# Patient Record
Sex: FEMALE | Race: WHITE | NOT HISPANIC OR LATINO | Employment: OTHER | URBAN - METROPOLITAN AREA
[De-identification: names, ages, dates, MRNs, and addresses within clinical notes are randomized per-mention and may not be internally consistent; named-entity substitution may affect disease eponyms.]

---

## 2017-03-12 ENCOUNTER — MYC MEDICAL ADVICE (OUTPATIENT)
Dept: FAMILY MEDICINE | Facility: CLINIC | Age: 72
End: 2017-03-12

## 2017-03-15 ENCOUNTER — MYC MEDICAL ADVICE (OUTPATIENT)
Dept: FAMILY MEDICINE | Facility: CLINIC | Age: 72
End: 2017-03-15

## 2017-03-18 ENCOUNTER — DOCUMENTATION ONLY (OUTPATIENT)
Dept: LAB | Facility: CLINIC | Age: 72
End: 2017-03-18

## 2017-03-18 DIAGNOSIS — E78.5 HYPERLIPIDEMIA LDL GOAL <130: ICD-10-CM

## 2017-03-18 DIAGNOSIS — R73.09 ELEVATED GLUCOSE: ICD-10-CM

## 2017-03-18 DIAGNOSIS — E03.9 HYPOTHYROIDISM: Primary | ICD-10-CM

## 2017-03-18 NOTE — PROGRESS NOTES
This patient is coming to lab April 6th.  orders have been placed for you to associate and sign. Please future any further labs you may want, thanks [facility] lab staff.

## 2017-03-23 NOTE — TELEPHONE ENCOUNTER
Called and spoke with pt and she states has an appt with Lisa Olivia on 4/7/17.    States this is ok with her.  She has not had any further vaginal bleeding but will follow with the OBGYN

## 2017-04-07 ENCOUNTER — OFFICE VISIT (OUTPATIENT)
Dept: OBGYN | Facility: CLINIC | Age: 72
End: 2017-04-07
Payer: COMMERCIAL

## 2017-04-07 VITALS
DIASTOLIC BLOOD PRESSURE: 80 MMHG | HEIGHT: 61 IN | WEIGHT: 170.8 LBS | TEMPERATURE: 97.9 F | BODY MASS INDEX: 32.25 KG/M2 | SYSTOLIC BLOOD PRESSURE: 128 MMHG

## 2017-04-07 DIAGNOSIS — N90.4 LICHEN SCLEROSUS ET ATROPHICUS OF THE VULVA: ICD-10-CM

## 2017-04-07 DIAGNOSIS — N93.0 POSTCOITAL BLEEDING: Primary | ICD-10-CM

## 2017-04-07 DIAGNOSIS — Z01.411 ENCOUNTER FOR GYNECOLOGICAL EXAMINATION WITH ABNORMAL FINDING: ICD-10-CM

## 2017-04-07 PROCEDURE — 99204 OFFICE O/P NEW MOD 45 MIN: CPT | Performed by: OBSTETRICS & GYNECOLOGY

## 2017-04-07 NOTE — PROGRESS NOTES
SUBJECTIVE:   CC: Post-coital bleeding                                            HPI: Abby Casey is a 72 year old  female who presents to clinic today for new onset post-coital bleeding in the pat month. Occurred twice, both times while visiting [place]. Has since then had intercourse with no bleeding.   - No prior occurrence of PMB.   - Previously on HRT for less than 1 year then self discontinued.   - Was prescribed vaginal estrogen years ago for atrophy but never started.   - On weekly clobetasol maintenance for lichen sclerosis, doing well with this  - no family history uterine, breast, ovarian cancer but worried about ovarian cancer    Problem list and histories reviewed & adjusted, as indicated.  Additional history: as documented.    ROS:  Const: no weight changes, fever, chills  : no dysuria, hematuria, urinary frequency, urgency, hesitancy  GI:  no constipation, diarrhea, abdominal pain    Patient Active Problem List   Diagnosis     Hypothyroidism     Abnormal blood chemistry     Disorder of bone and cartilage     Diverticulosis of large intestine     Lichen sclerosus et atrophicus of the vulva     HYPERLIPIDEMIA LDL GOAL <130     Elevated glucose     Advanced directives, counseling/discussion     Papanicolaou smear of cervix with low grade squamous intraepithelial lesion (LGSIL)     Acute pain of right shoulder     Right rotator cuff tear     Rupture of right proximal biceps tendon     Tendinopathy of right rotator cuff     Osteoarthritis of right acromioclavicular joint     Adjustment disorder with anxious mood     Obesity     Past Surgical History:   Procedure Laterality Date     C NONSPECIFIC PROCEDURE  10/99    Colposcopy--reactive atypia         C REMOVAL GALLBLADDER      [facility]       COLONOSCOPY       and      COLONOSCOPY N/A 2016    Procedure: COLONOSCOPY;  Surgeon: Adriano Roberto MD, MD;  Location:  GI     EYE SURGERY      Bilateral cataract extraction      "TUBAL LIGATION        Social History   Substance Use Topics     Smoking status: Never Smoker     Smokeless tobacco: Never Used     Alcohol use Yes      Problem (# of Occurrences) Relation (Name,Age of Onset)    CANCER (1) Father: Liver Cancer, Eye  Cancer    DIABETES (3) Brother, Brother, Sister    HEART DISEASE (1) Father:  @77       Negative family history of: Colon Cancer              Current Outpatient Prescriptions on File Prior to Visit:  hydrOXYzine (VISTARIL) 25 MG capsule    clobetasol (TEMOVATE) 0.05 % cream use at bedtime weekly at bedtime for maintenance   simvastatin (ZOCOR) 40 MG tablet Take 1 tablet (40 mg) by mouth At Bedtime   levothyroxine (SYNTHROID, LEVOTHROID) 125 MCG tablet Take 1 tablet (125 mcg) by mouth daily Profile Rx: patient will contact pharmacy when needed   Chromium-Cinnamon 100-500 MCG-MG CAPS Take 1 tablet by mouth 2 times daily   guaiFENesin (MUCINEX) 600 MG 12 hr tablet Take 2 tablets by mouth 2 times daily as needed for congestion.   NAPROSYN 500 MG OR TABS 1 TABLET TWICE DAILY PRN   PROSHIELD PLUS SKIN PROTECTANT 1 % EX CREA use as needed  up to QID   CALCIUM + D 600-200 MG-UNIT OR TABS 1 TABLET DAILY   ASPIRIN 81 MG OR TABS ONE DAILY   MULTIVITAMIN OR 1 daily     No current facility-administered medications on file prior to visit.   Allergies   Allergen Reactions     Neurontin [Gabapentin] Visual Disturbance     Amoxicillin Hives     Clindamycin Hcl Diarrhea       OBJECTIVE:   /80 (BP Location: Left arm, Patient Position: Chair, Cuff Size: Adult Regular)  Temp 97.9  F (36.6  C) (Oral)  Ht 5' 1.25\" (1.556 m)  Wt 170 lb 12.8 oz (77.5 kg)  BMI 32.01 kg/m2   Gen: sitting in chair in no acute distress, comfortable  HENT: no scleral icterus, thyroid normal size  CV: regular rate, well perfused  Pulm: no increased work of breathing, no cough  Skin: warm and dry, no rashes/lesions  Psych: mood stable, appropriate affect  Neuro: A+Ox3   : External genitalia atrophic " with fusion of anterior labia minora and majora. 2mm inclusion cyst on left labia majora at 3 o'clock position. No obvious excoriations, lesions, or rashes. Urethra with small hyperemic caruncle, no obvious bleeding during exam.  SSE: Normal cervix, no polyp or ectropion.   Bimanual: No CMT, small mobile uterus, fullness noted in right adnexa.      ASSESSMENT/PLAN:                                                    Abby Casey is a 72 year old female with postmenopausal bleeding and routine gyn exam.    1. Postcoital bleeding  - no evidence of cervical abnormality as source of bleeding, however urethral caruncle may be the cause  - discussed risk of malignancy and endometrial biopsy to rule out. Will consider pending US findings.   - US Pelvic Complete w Transvaginal; Future    2. Encounter for gynecological examination with abnormal finding  - up to date on mammogram, repeat due in June  - recommend annual breast and pelvic exams    3. Lichen sclerosus et atrophicus of the vulva  - continue clobetasol once weekly     4. Intermittent LLQ pelvic pain  - discussed etiologies to include ovarian cysts, known diverticulum, musculoskeletal, neruopathic. US for evaluation of ovaries.    Total time spent was 40 minutes; greater than 50% of time was spent in counseling and/or coordination of care for the above listed diagnoses.     Negra Olivia MD  Obstetrics and Gynecology   [facility]

## 2017-04-07 NOTE — MR AVS SNAPSHOT
After Visit Summary   4/7/2017    Abby Casey    MRN: [id number]           Patient Information     Date Of Birth          1945        Visit Information        Provider Department      4/7/2017 1:15 PM Negra Olivia MD [facility]        Today's Diagnoses     Postcoital bleeding    -  1    Encounter for gynecological examination with abnormal finding        Lichen sclerosus et atrophicus of the vulva           Follow-ups after your visit        Your next 10 appointments already scheduled     Apr 17, 2017  8:30 AM CDT   Lab visit with  LAB   [facility] ([facility]    [address] [address] [phone]           Please do not eat 10-12 hours before your appointment if you are coming in fasting for labs on lipids, cholesterol, or glucose (sugar). Does not apply to pregnant women.  Water with medications is okay. Do not drink coffee or other fluids.  If you have concerns about taking  your medications, please send a message by clicking on Secure Messaging, Message Your Care Team.            Apr 25, 2017  9:00 AM CDT   US PELVIC COMPLETE W TRANSVAGINAL with RIUS1   [facility] ([facility])    [address]  [address]  [facility] [address] [phone]           Please bring a list of your medicines (including vitamins, minerals and over-the-counter drugs). Also, tell your doctor about any allergies you may have. Wear comfortable clothes and leave your valuables at home.  Adults: Drink six 8-ounce glasses of fluid one hour before your exam. Do NOT empty your bladder.  If you need to empty your bladder before your exam, try to release only a little bit of urine. Then, drink another 8oz glass of fluid.  Children: Children who are potty trained should drink at least 4 cups (32 oz) of liquid 45 minutes to one hour prior to the exam. The child s bladder must be full in  order to achieve a diagnostic exam. If your child is very uncomfortable or has an urgent need to pee, please notify a technologist; they will try to find out how much longer the wait may be and provide instructions to help relieve the pressure. Occasionally it is medically necessary to insert a urinary catheter to fill the bladder.  Please call the Imaging Department at your exam site with any questions.            Apr 26, 2017  8:30 AM CDT   MyChart Physical Adult with Gela Bueno MD   [facility]    [address] [facility] [address] [phone]            Apr 28, 2017  9:15 AM CDT   SHORT with Negra Olivia MD   [facility] ([facility])    [address] [place]  [facility] [address] [phone]              Future tests that were ordered for you today     Open Future Orders        Priority Expected Expires Ordered    US Pelvic Complete w Transvaginal Routine 7/6/2017 4/7/2018 4/7/2017            Who to contact     If you have questions or need follow up information about today's clinic visit or your schedule please contact [facility] directly at [phone].  Normal or non-critical lab and imaging results will be communicated to you by Eurocepthart, letter or phone within 4 business days after the clinic has received the results. If you do not hear from us within 7 days, please contact the clinic through Eurocepthart or phone. If you have a critical or abnormal lab result, we will notify you by phone as soon as possible.  Submit refill requests through Meet You or call your pharmacy and they will forward the refill request to us. Please allow 3 business days for your refill to be completed.          Additional Information About Your Visit        Meet You Information     Meet You gives you secure access to your electronic health record. If you see a primary care provider, you can also send messages  "to your care team and make appointments. If you have questions, please call your primary care clinic.  If you do not have a primary care provider, please call [phone] and they will assist you.        Care EveryWhere ID     This is your Care EveryWhere ID. This could be used by other organizations to access your [facility] medical records  [id number]        Your Vitals Were     Temperature Height BMI (Body Mass Index)             97.9  F (36.6  C) (Oral) 5' 1.25\" (1.556 m) 32.01 kg/m2          Blood Pressure from Last 3 Encounters:   04/07/17 128/80   11/16/16 107/71   04/25/16 124/68    Weight from Last 3 Encounters:   04/07/17 170 lb 12.8 oz (77.5 kg)   11/16/16 169 lb (76.7 kg)   04/25/16 175 lb 9.6 oz (79.7 kg)               Primary Care Provider Office Phone # Fax #    Gela Bueno [phone]       [facility] [address] [facility] [address]        Thank you!     Thank you for choosing [facility]  for your care. Our goal is always to provide you with excellent care. Hearing back from our patients is one way we can continue to improve our services. Please take a few minutes to complete the written survey that you may receive in the mail after your visit with us. Thank you!             Your Updated Medication List - Protect others around you: Learn how to safely use, store and throw away your medicines at www.disposemymeds.org.          This list is accurate as of: 4/7/17  4:29 PM.  Always use your most recent med list.                   Brand Name Dispense Instructions for use    aspirin 81 MG tablet     100    ONE DAILY       calcium + D 600-200 MG-UNIT Tabs   Generic drug:  calcium carbonate-vitamin D      1 TABLET DAILY       Chromium-Cinnamon 100-500 MCG-MG Caps      Take 1 tablet by mouth 2 times daily       clobetasol 0.05 % cream    TEMOVATE    15 g    use at bedtime weekly at bedtime for maintenance       guaiFENesin 600 MG 12 hr tablet "    MUCINEX    40 tablet    Take 2 tablets by mouth 2 times daily as needed for congestion.       hydrOXYzine 25 MG capsule    VISTARIL         levothyroxine 125 MCG tablet    SYNTHROID/LEVOTHROID    90 tablet    Take 1 tablet (125 mcg) by mouth daily Profile Rx: patient will contact pharmacy when needed       MULTIVITAMIN PO      1 daily       NAPROSYN 500 MG tablet   Generic drug:  naproxen      1 TABLET TWICE DAILY PRN       PROSHIELD PLUS SKIN PROTECTANT 1 %   Generic drug:  dimethicone     1 tube    use as needed  up to QID       simvastatin 40 MG tablet    ZOCOR    90 tablet    Take 1 tablet (40 mg) by mouth At Bedtime

## 2017-04-07 NOTE — NURSING NOTE
"Chief Complaint   Patient presents with     Vaginal Problem   bleeding after intercourse. No spotting at all just blood on tissue with wiping.  Has had intercourse since those 2x and did not have any bleeding.  Aleksandra Tierney MA    Initial /80 (BP Location: Left arm, Patient Position: Chair, Cuff Size: Adult Regular)  Temp 97.9  F (36.6  C) (Oral)  Ht 5' 1.25\" (1.556 m)  Wt 170 lb 12.8 oz (77.5 kg)  BMI 32.01 kg/m2 Estimated body mass index is 32.01 kg/(m^2) as calculated from the following:    Height as of this encounter: 5' 1.25\" (1.556 m).    Weight as of this encounter: 170 lb 12.8 oz (77.5 kg).  BP completed using cuff size: regular        The following HM Due: NONE      The following patient reported/Care Every where data was sent to:        n/a                       "

## 2017-04-17 DIAGNOSIS — E03.9 HYPOTHYROIDISM: ICD-10-CM

## 2017-04-17 DIAGNOSIS — R73.09 ELEVATED GLUCOSE: ICD-10-CM

## 2017-04-17 DIAGNOSIS — E78.5 HYPERLIPIDEMIA LDL GOAL <130: ICD-10-CM

## 2017-04-17 LAB
ALBUMIN SERPL-MCNC: 3.8 G/DL (ref 3.4–5)
ALP SERPL-CCNC: 103 U/L (ref 40–150)
ALT SERPL W P-5'-P-CCNC: 22 U/L (ref 0–50)
ANION GAP SERPL CALCULATED.3IONS-SCNC: 9 MMOL/L (ref 3–14)
AST SERPL W P-5'-P-CCNC: 19 U/L (ref 0–45)
BILIRUB SERPL-MCNC: 0.5 MG/DL (ref 0.2–1.3)
BUN SERPL-MCNC: 19 MG/DL (ref 7–30)
CALCIUM SERPL-MCNC: 9.3 MG/DL (ref 8.5–10.1)
CHLORIDE SERPL-SCNC: 108 MMOL/L (ref 94–109)
CHOLEST SERPL-MCNC: 169 MG/DL
CO2 SERPL-SCNC: 26 MMOL/L (ref 20–32)
CREAT SERPL-MCNC: 0.61 MG/DL (ref 0.52–1.04)
GFR SERPL CREATININE-BSD FRML MDRD: ABNORMAL ML/MIN/1.7M2
GLUCOSE SERPL-MCNC: 120 MG/DL (ref 70–99)
HBA1C MFR BLD: 5.9 % (ref 4.3–6)
HDLC SERPL-MCNC: 44 MG/DL
LDLC SERPL CALC-MCNC: 92 MG/DL
NONHDLC SERPL-MCNC: 125 MG/DL
POTASSIUM SERPL-SCNC: 4.7 MMOL/L (ref 3.4–5.3)
PROT SERPL-MCNC: 7.3 G/DL (ref 6.8–8.8)
SODIUM SERPL-SCNC: 143 MMOL/L (ref 133–144)
T4 FREE SERPL-MCNC: 1.47 NG/DL (ref 0.76–1.46)
TRIGL SERPL-MCNC: 167 MG/DL
TSH SERPL DL<=0.005 MIU/L-ACNC: 0.12 MU/L (ref 0.4–4)

## 2017-04-17 PROCEDURE — 84443 ASSAY THYROID STIM HORMONE: CPT | Performed by: INTERNAL MEDICINE

## 2017-04-17 PROCEDURE — 83036 HEMOGLOBIN GLYCOSYLATED A1C: CPT | Performed by: INTERNAL MEDICINE

## 2017-04-17 PROCEDURE — 84439 ASSAY OF FREE THYROXINE: CPT | Performed by: INTERNAL MEDICINE

## 2017-04-17 PROCEDURE — 36415 COLL VENOUS BLD VENIPUNCTURE: CPT | Performed by: INTERNAL MEDICINE

## 2017-04-17 PROCEDURE — 80061 LIPID PANEL: CPT | Performed by: INTERNAL MEDICINE

## 2017-04-17 PROCEDURE — 80053 COMPREHEN METABOLIC PANEL: CPT | Performed by: INTERNAL MEDICINE

## 2017-04-25 ENCOUNTER — RADIANT APPOINTMENT (OUTPATIENT)
Dept: ULTRASOUND IMAGING | Facility: CLINIC | Age: 72
End: 2017-04-25
Attending: OBSTETRICS & GYNECOLOGY
Payer: COMMERCIAL

## 2017-04-25 DIAGNOSIS — N93.0 POSTCOITAL BLEEDING: ICD-10-CM

## 2017-04-25 PROCEDURE — 76830 TRANSVAGINAL US NON-OB: CPT | Mod: 59 | Performed by: OBSTETRICS & GYNECOLOGY

## 2017-04-25 PROCEDURE — 76856 US EXAM PELVIC COMPLETE: CPT | Performed by: OBSTETRICS & GYNECOLOGY

## 2017-04-26 ENCOUNTER — OFFICE VISIT (OUTPATIENT)
Dept: FAMILY MEDICINE | Facility: CLINIC | Age: 72
End: 2017-04-26
Payer: COMMERCIAL

## 2017-04-26 VITALS
DIASTOLIC BLOOD PRESSURE: 68 MMHG | HEIGHT: 62 IN | OXYGEN SATURATION: 98 % | SYSTOLIC BLOOD PRESSURE: 124 MMHG | WEIGHT: 167.5 LBS | RESPIRATION RATE: 16 BRPM | HEART RATE: 71 BPM | TEMPERATURE: 97.7 F | BODY MASS INDEX: 30.82 KG/M2

## 2017-04-26 DIAGNOSIS — E78.5 HYPERLIPIDEMIA LDL GOAL <130: ICD-10-CM

## 2017-04-26 DIAGNOSIS — Z00.00 ROUTINE GENERAL MEDICAL EXAMINATION AT A HEALTH CARE FACILITY: Primary | ICD-10-CM

## 2017-04-26 DIAGNOSIS — E03.9 HYPOTHYROIDISM, UNSPECIFIED TYPE: ICD-10-CM

## 2017-04-26 DIAGNOSIS — Z12.31 VISIT FOR SCREENING MAMMOGRAM: ICD-10-CM

## 2017-04-26 DIAGNOSIS — Z11.59 NEED FOR HEPATITIS C SCREENING TEST: ICD-10-CM

## 2017-04-26 PROCEDURE — 36415 COLL VENOUS BLD VENIPUNCTURE: CPT | Performed by: INTERNAL MEDICINE

## 2017-04-26 PROCEDURE — 86803 HEPATITIS C AB TEST: CPT | Performed by: INTERNAL MEDICINE

## 2017-04-26 PROCEDURE — 99397 PER PM REEVAL EST PAT 65+ YR: CPT | Performed by: INTERNAL MEDICINE

## 2017-04-26 PROCEDURE — 99213 OFFICE O/P EST LOW 20 MIN: CPT | Mod: 25 | Performed by: INTERNAL MEDICINE

## 2017-04-26 PROCEDURE — 84443 ASSAY THYROID STIM HORMONE: CPT | Performed by: INTERNAL MEDICINE

## 2017-04-26 PROCEDURE — 84439 ASSAY OF FREE THYROXINE: CPT | Performed by: INTERNAL MEDICINE

## 2017-04-26 NOTE — NURSING NOTE
"Chief Complaint   Patient presents with     Physical     Thyroid Problem     Lipids       Initial /68 (BP Location: Right arm, Patient Position: Chair, Cuff Size: Adult Large)  Pulse 71  Temp 97.7  F (36.5  C) (Oral)  Resp 16  Ht 5' 1.8\" (1.57 m)  Wt 167 lb 8 oz (76 kg)  SpO2 98%  BMI 30.83 kg/m2 Estimated body mass index is 30.83 kg/(m^2) as calculated from the following:    Height as of this encounter: 5' 1.8\" (1.57 m).    Weight as of this encounter: 167 lb 8 oz (76 kg).  Medication Reconciliation: complete        "

## 2017-04-26 NOTE — MR AVS SNAPSHOT
After Visit Summary   4/26/2017    Abby Casey    MRN: [id number]           Patient Information     Date Of Birth          1945        Visit Information        Provider Department      4/26/2017 8:30 AM Gela Bueno MD [facility]        Today's Diagnoses     Routine general medical examination at a health care facility    -  1    Hyperlipidemia LDL goal <130        Hypothyroidism, unspecified type        Visit for screening mammogram        Need for hepatitis C screening test          Care Instructions      Preventive Health Recommendations    Female Ages 65 +    Yearly exam:     See your health care provider every year in order to  o Review health changes.   o Discuss preventive care.    o Review your medicines if your doctor has prescribed any.      You no longer need a yearly Pap test unless you've had an abnormal Pap test in the past 10 years. If you have vaginal symptoms, such as bleeding or discharge, be sure to talk with your provider about a Pap test.      Every 1 to 2 years, have a mammogram.  If you are over 69, talk with your health care provider about whether or not you want to continue having screening mammograms.      Every 10 years, have a colonoscopy. Or, have a yearly FIT test (stool test). These exams will check for colon cancer.       Have a cholesterol test every 5 years, or more often if your doctor advises it.       Have a diabetes test (fasting glucose) every three years. If you are at risk for diabetes, you should have this test more often.       At age 65, have a bone density scan (DEXA) to check for osteoporosis (brittle bone disease).    Shots:    Get a flu shot each year.    Get a tetanus shot every 10 years.    Talk to your doctor about your pneumonia vaccines. There are now two you should receive - Pneumovax (PPSV 23) and Prevnar (PCV 13).    Talk to your doctor about the shingles vaccine.    Talk to your doctor about the hepatitis B  vaccine.    Nutrition:     Eat at least 5 servings of fruits and vegetables each day.      Eat whole-grain bread, whole-wheat pasta and brown rice instead of white grains and rice.      Talk to your provider about Calcium and Vitamin D.     Lifestyle    Exercise at least 150 minutes a week (30 minutes a day, 5 days a week). This will help you control your weight and prevent disease.      Limit alcohol to one drink per day.      No smoking.       Wear sunscreen to prevent skin cancer.       See your dentist twice a year for an exam and cleaning.      See your eye doctor every 1 to 2 years to screen for conditions such as glaucoma, macular degeneration and cataracts.        Follow-ups after your visit        Your next 10 appointments already scheduled     Apr 28, 2017  9:15 AM CDT   SHORT with Negra Olivia MD   [facility]    [address]  [facility] [address]   [phone]              Future tests that were ordered for you today     Open Future Orders        Priority Expected Expires Ordered    MA Screening Digital Bilateral Routine  4/26/2018 4/26/2017    TSH with free T4 reflex Routine  4/26/2018 4/26/2017            Who to contact     If you have questions or need follow up information about today's clinic visit or your schedule please contact [facility] directly at [phone].  Normal or non-critical lab and imaging results will be communicated to you by MyChart, letter or phone within 4 business days after the clinic has received the results. If you do not hear from us within 7 days, please contact the clinic through MyChart or phone. If you have a critical or abnormal lab result, we will notify you by phone as soon as possible.  Submit refill requests through NanoRacks or call your pharmacy and they will forward the refill request to us. Please allow 3 business days for your refill to be completed.          Additional Information  "About Your Visit        MyChart Information     SMB Suite gives you secure access to your electronic health record. If you see a primary care provider, you can also send messages to your care team and make appointments. If you have questions, please call your primary care clinic.  If you do not have a primary care provider, please call [phone] and they will assist you.        Care EveryWhere ID     This is your Care EveryWhere ID. This could be used by other organizations to access your [facility] medical records  [id number]        Your Vitals Were     Pulse Temperature Respirations Height Pulse Oximetry BMI (Body Mass Index)    71 97.7  F (36.5  C) (Oral) 16 5' 1.8\" (1.57 m) 98% 30.83 kg/m2       Blood Pressure from Last 3 Encounters:   04/26/17 124/68   04/07/17 128/80   11/16/16 107/71    Weight from Last 3 Encounters:   04/26/17 167 lb 8 oz (76 kg)   04/07/17 170 lb 12.8 oz (77.5 kg)   11/16/16 169 lb (76.7 kg)              We Performed the Following     Hepatitis C Screen Reflex to HCV RNA Quant and Genotype        Primary Care Provider Office Phone # Fax #    Gela Bueno [phone]       [facility] [address] [facility] [address]        Thank you!     Thank you for choosing [facility]  for your care. Our goal is always to provide you with excellent care. Hearing back from our patients is one way we can continue to improve our services. Please take a few minutes to complete the written survey that you may receive in the mail after your visit with us. Thank you!             Your Updated Medication List - Protect others around you: Learn how to safely use, store and throw away your medicines at www.disposemymeds.org.          This list is accurate as of: 4/26/17  9:13 AM.  Always use your most recent med list.                   Brand Name Dispense Instructions for use    aspirin 81 MG tablet     100    ONE DAILY       calcium + D 600-200 MG-UNIT " Tabs   Generic drug:  calcium carbonate-vitamin D      1 TABLET DAILY       Chromium-Cinnamon 100-500 MCG-MG Caps      Take 1 tablet by mouth 2 times daily       clobetasol 0.05 % cream    TEMOVATE    15 g    use at bedtime weekly at bedtime for maintenance       guaiFENesin 600 MG 12 hr tablet    MUCINEX    40 tablet    Take 2 tablets by mouth 2 times daily as needed for congestion.       levothyroxine 125 MCG tablet    SYNTHROID/LEVOTHROID    90 tablet    Take 1 tablet (125 mcg) by mouth daily Profile Rx: patient will contact pharmacy when needed       MULTIVITAMIN PO      1 daily       NAPROSYN 500 MG tablet   Generic drug:  naproxen      1 TABLET TWICE DAILY PRN       PROSHIELD PLUS SKIN PROTECTANT 1 %   Generic drug:  dimethicone     1 tube    use as needed  up to QID       simvastatin 40 MG tablet    ZOCOR    90 tablet    Take 1 tablet (40 mg) by mouth At Bedtime

## 2017-04-26 NOTE — PROGRESS NOTES
SUBJECTIVE:                                                            Abby Casey is a 72 year old female who presents for Preventive Visit.    Are you in the first 12 months of your Medicare coverage?  No    Physical   Annual:     Getting at least 3 servings of Calcium per day::  Yes    Bi-annual eye exam::  Yes    Dental care twice a year::  Yes    Sleep apnea or symptoms of sleep apnea::  None    Diet::  Low fat/cholesterol and Carbohydrate counting    Frequency of exercise::  2-3 days/week    Duration of exercise::  15-30 minutes    Taking medications regularly::  Yes    Additional concerns today::  No  Q1: Little interest or pleasure in doing things: 0=Not at all  Q2: Feeling down, depressed or hopeless: 0=Not at all  PHQ-2 Score: 0    COGNITIVE SCREEN  1) Repeat 3 items (Banana, Sunrise, Chair)    2) Clock draw: NORMAL  3) 3 item recall: Recalls 3 objects  Results: 3 items recalled: COGNITIVE IMPAIRMENT LESS LIKELY    Mini-CogTM Copyright MARCOS Urrutia. Licensed by the author for use in [facility]; reprinted with permission (chanel@[facility]). All rights reserved.        Reviewed and updated as needed this visit by clinical staff  Tobacco  Allergies  Meds  Med Hx  Surg Hx  Fam Hx  Soc Hx        Reviewed and updated as needed this visit by Provider        Social History   Substance Use Topics     Smoking status: Never Smoker     Smokeless tobacco: Never Used     Alcohol use Yes     The patient does not drink >3 drinks per day nor >7 drinks per week.      Hyperlipidemia Follow-Up    Rate your low fat/cholesterol diet?: Good    Taking statin? Yes, no muscle aches from statin    Other lipid medications/supplements?: None  Recent Labs   Lab Test  04/17/17   0827  10/05/16   0838   04/16/15   0835  10/15/14   0801   CHOL  169  139   < >  164  159   HDL  44*  42*   < >  47*  45*   LDL  92  77   < >  94  80   TRIG  167*  98   < >  115  172*   CHOLHDLRATIO   --    --    --   3.5  3.5    < > = values in  this interval not displayed.        Hypothyroidism Follow-up    Since last visit, patient describes the following symptoms: Weight stable, no hair loss, no skin changes, no constipation, no loose stools  TSH   Date Value Ref Range Status   04/17/2017 0.12 (L) 0.40 - 4.00 mU/L Final     T4 Free   Date Value Ref Range Status   04/17/2017 1.47 (H) 0.76 - 1.46 ng/dL Final     Elevated T4 levels:   Patient acknowledges that she took her morning medications, as always, prior to her appointment and may be the cause of her elevated T4 readings.     Elevated glucose:   Patient has a history of elevated fasting glucose readings, which is currently being monitored with routine hemoglobin A1c drawings. Her Hemoglobin A1c is at 5.9 today and is considered within normal limits. However, encouraged to keep active and watch diet for a natural intervention of elevated glucose readings and prevention of diabetes.  Lab Results   Component Value Date    A1C 5.9 04/17/2017    A1C 5.8 10/05/2016    A1C 6.0 04/04/2016    A1C 5.9 07/27/2015    A1C 6.2 04/16/2015     Propranolol:   Patient has stopped taking Propranolol, as recommended, because she is no longer having migraines. In the event that her heart races, she is able to regulate her heart rate with valsalva and coughing.    Hepatitis C Screening:  The patient was born between 1944 and 1964, placing her at an increased risk for developing Hepatitis C. She was informed of her increased risk and will follow through with screening today in addition to fasting labs.     Chronic Cough:  When asked, the patient acknowledges that she has a cough, but this is a chronic condition from her previous diagnosis of rhinitis. She states her symptoms are mild and well-controlled with the use of saline nasal spray every morning.      Today's PHQ-2 Score:   PHQ-2 ( 1999 Pfizer) 4/23/2017   Little interest or pleasure in doing things Not at all   Feeling down, depressed or hopeless Not at all   PHQ-2  Score 0     Do you feel safe in your environment - Yes  Do you have a Health Care Directive?: Yes: Advance Directive has been received and scanned.    Current providers sharing in care for this patient include:   Patient Care Team:  Gela Bueno MD as PCP - General      Hearing impairment: No    Ability to successfully perform activities of daily living: Yes, no assistance needed     Fall risk:  Fallen 2 or more times in the past year?: No  Any fall with injury in the past year?: No    Home safety:  none identified    The following health maintenance items are reviewed in Epic and correct as of today:  Health Maintenance   Topic Date Due     HEPATITIS C SCREENING  03/15/1963     FALL RISK ASSESSMENT  04/25/2017     DEXA Q3 YR  06/09/2017     MAMMO Q1 YR INBASKET MESSAGE  06/13/2017     INFLUENZA VACCINE (SYSTEM ASSIGNED)  09/01/2017     TSH Q1 YEAR (NO INBASKET)  04/17/2018     LIPID MONITORING Q1 YEAR( NO INBASKET)  04/17/2018     PAP Q3 YR  04/23/2018     ADVANCE DIRECTIVE PLANNING Q5 YRS (NO INBASKET)  04/21/2019     TETANUS Q10 YR  09/15/2020     COLONOSCOPY Q10 YR INBASKET MESSAGE  11/16/2026     PNEUMOCOCCAL  Completed       Mammogram Screening: Patient over age 50, mutual decision to screen reflected in health maintenance.       REVIEW OF SYSTEMS:  C: NEGATIVE for fever, chills, or change in weight  E: NEGATIVE for vision changes or irritation  E/M: NEGATIVE for ear, mouth and throat problems  R: Hx of Rhinitis that produces a chronic cough - well controlled with saline nasal spray; NEGATIVE for significant cough or SOB  CV: NEGATIVE for chest pain, palpitations or peripheral edema  GI: NEGATIVE for nausea, abdominal pain, heartburn, or change in bowel habits  B: NEGATIVE for masses, tenderness, or nipple discharge  : NEGATIVE for unusual urinary or vaginal symptoms  M: NEGATIVE for significant arthralgias or myalgia  N: Hx of Migraines - Well controlled without propranolol; NEGATIVE for weakness,  "dizziness or paresthesias  E: Hx of Elevated Glucose - labs reviewed; Hx of Hyperlipidemia - use of Simvastatin - labs reviewed; Hx of Hypothyroidism - use of Levothyroxine - labs reviewed; NEGATIVE for temperature intolerance, or skin/hair changes  P: NEGATIVE for changes in mood or affect    This document serves as a record of the services and decisions personally performed and made by Gela Bueno MD. It was created on her behalf by Shira Hinds, a trained medical scribe. The creation of this document is based the provider's statements to the medical scribe.  Shira Hinds, April 26, 2017 8:44 AM     Problem list, Medication list, Allergies, and Medical/Social/Surgical histories reviewed in [facility] and updated as appropriate.  Labs reviewed in EPIC  OBJECTIVE:                                                            /68 (BP Location: Right arm, Patient Position: Chair, Cuff Size: Adult Large)  Pulse 71  Temp 97.7  F (36.5  C) (Oral)  Resp 16  Ht 5' 1.8\" (1.57 m)  Wt 167 lb 8 oz (76 kg)  SpO2 98%  BMI 30.83 kg/m2 Estimated body mass index is 30.83 kg/(m^2) as calculated from the following:    Height as of this encounter: 5' 1.8\" (1.57 m).    Weight as of this encounter: 167 lb 8 oz (76 kg).    EXAM:  GENERAL: Patient appears healthy, alert and not distressed.  EYES: Eyes appear grossly normal to inspection, with normal conjunctivae and sclerae  HENT: Ear canals and TM's appear normal, mouth is without ulcers or lesions, oropharynx is clear and oral mucous membranes are moist  NECK: No adenopathy present, no asymmetry, masses, or scars noted, thyroid is normal to palpation  RESP: Lungs are clear to auscultation - no rales, rhonchi or wheezes present  CV: Regular rate and rhythm, normal S1 S2 heart sounds, no ectopy, no peripheral edema present, peripheral pulses normal, no carotid bruit.  ABDOMEN: Soft, nontender, no hepatosplenomegaly, no masses, normal bowel sounds  BREAST: Normal without masses, " tenderness or nipple discharge and no palpable axillary masses or adenopathy   MS: No gross musculoskeletal defects noted, no edema, gait is age appropriate without ataxia, normal heel/toe walk  SKIN: No suspicious rashes, lesions, or moles  NEURO: Patient exhibits normal strength and tone, normal speech and mentation, DTR's intact, Romberg's negative  PSYCH: Mentation appears normal, affect is normal/bright    Diagnostic Test Results:  Results for orders placed or performed in visit on 04/25/17 (from the past 24 hour(s))   US Pelvic Complete w Transvaginal    Narrative    [facility]  Obstetrics & Gynecology  [address] [facility]. [address]  [place] [address]  Tel: [phone]     ULTRASOUND - PELVIC GYN     Referring MD: Negra Olivia  Primary Clinic: [facility]     ===================================     CLINICAL INFORMATION     Indications for ultrasound: Left adnexal pain,  Post coital bleeding     LMP: post menopausal 15+ years Hormones: none     Measurements:  Uterus: 4.9 x 2.2 x 3.3 cm.   Position is anteverted. Contour is smth/reg.     Endo cav: 6.3 mm Irregular and Prominent  Cervix: Wnl     Right ovary: Nv  Left ovary: Nv     Cul de sac: no free fluid     ===================================  Complete pelvic ultrasound utilizing both abdominal and vaginal   transducers.      Prominent endometrial lining for postmenopausal status; endometrial stripe   is 6.3 mm and heteroechoic.    Consider further evaluation/sampling for   evaluation of endometrial hyperplasia, endometrial cancer as clinically   indicated.   Alternately endometrial polyps or small submucous myoma may   be also in differential; consider further evaluation as indicated.        Bilateral adnexa not visualized.      Ailyn Esteban M.D.       ASSESSMENT / PLAN:                                                            (Z00.00) Routine general medical examination at a health care facility  (primary encounter  diagnosis)  Comment: Annual preventative visit. Medications reviewed with patient. TDAP 2010  Continue to eat healthy and get regular exercise - goal of 150 minutes per week  Plan: MA Screening Digital Bilateral          (E78.5) Hyperlipidemia LDL goal <130  Comment: Labs reviewed. Patient's LDL Cholesterol - which was drawn on 04/17/2017 - resulted at 92 and is below the goal of 130. Simvastatin is effective and well-tolerated, with no reported side effects including muscle aches. Refilled her prescription of Simvastatin with no changes made in dosing today. Will continue to monitor on a routine basis with labs. Reviewed riskvs benefits; she desires to remain on current dose of medications.  Recent Labs   Lab Test  04/17/17   0827  10/05/16   0838   04/16/15   0835  10/15/14   0801   CHOL  169  139   < >  164  159   HDL  44*  42*   < >  47*  45*   LDL  92  77   < >  94  80   TRIG  167*  98   < >  115  172*   CHOLHDLRATIO   --    --    --   3.5  3.5    < > = values in this interval not displayed.   Plan: simvastatin (ZOCOR) 40 MG tablet          (E03.9) Hypothyroidism, unspecified type  Comment: Labs reviewed. The patient's TSH is below reference range and resulted at 0.12. Additionally, her T4 is elevated and above reference range and resulted at 1.47. Will recheck her TSH and T4 levels today, two hours after her morning dose was taken. If it remains elevated, will reduce her dose of Levothyroxine from 125 to 112 MCG. If it returns to normal reference range, will make no changes in the dosage and instead, will continue to monitor with routine labs. If thyroid labs warrant med dose change, then recommend reassess in 6 months along with her lipids, etc.  TSH   Date Value Ref Range Status   04/17/2017 0.12 (L) 0.40 - 4.00 mU/L Final     T4 Free   Date Value Ref Range Status   04/17/2017 1.47 (H) 0.76 - 1.46 ng/dL Final   Plan: levothyroxine (SYNTHROID/LEVOTHROID) 125 MCG         tablet, TSH with free T4 reflex; will  "defer ordering meds until lab results back          (Z12.31) Visit for screening mammogram  Comment: Patient recommended to return on a yearly basis for routine screening mammograms. Her last screening was performed last year on 6/13/2016, indicating she is due for additional screening in June, 2017. Future orders placed so the patient may schedule an appointment whenever she is available for routine screening.  Plan: MA Screening Digital Bilateral          (Z11.59) Need for hepatitis C screening test  Comment: Hep C screening recommendation reviewed with the patient today. Patient is considered low risk, but will follow through with screening today when she leaves another blood draw to recheck her thyroid levels.  Plan: Hepatitis C Screen Reflex to HCV RNA Quant and Genotype            End of Life Planning:  Patient currently has an advanced directive: Yes.  Practitioner is supportive of decision.    COUNSELING:  Reviewed preventive health counseling, as reflected in patient instructions  Special attention given to:       Regular exercise       Healthy diet/nutrition       Hepatitis C screening    Estimated body mass index is 30.83 kg/(m^2) as calculated from the following:    Height as of this encounter: 5' 1.8\" (1.57 m).    Weight as of this encounter: 167 lb 8 oz (76 kg).  Weight management plan: Encouraged to exercise more frequently with a goal of 150 minutes of exercise per week, and to maintain a healthy, well-balanced diet.      reports that she has never smoked. She has never used smokeless tobacco.      Appropriate preventive services were discussed with this patient, including applicable screening as appropriate for cardiovascular disease, diabetes, osteopenia/osteoporosis, and glaucoma.  As appropriate for age/gender, discussed screening for colorectal cancer, prostate cancer, breast cancer, and cervical cancer. Checklist reviewing preventive services available has been given to the patient.    Reviewed " patients plan of care and provided an AVS. The Basic Care Plan (routine screening as documented in Health Maintenance) for Abby meets the Care Plan requirement. This Care Plan has been established and reviewed with the Patient.    Counseling Resources:  ATP IV Guidelines  Pooled Cohorts Equation Calculator  Breast Cancer Risk Calculator  FRAX Risk Assessment  ICSI Preventive Guidelines  Dietary Guidelines for Americans, 2010  in[employer]'s MyPlate  ASA Prophylaxis  Lung CA Screening    The information in this document, created by a medical scribe for me, accurately reflects the services I personally performed and the decisions made by me. I have reviewed and approved this document for accuracy.  Dr. Gela Bueno, April 26, 2017, 9:17 AM      Gela Bueno MD  Internal Medicine   [facility] [place]  15 minutes in addition to HEALTH CARE MAINTENANCE are spent with patient, over 50% of that time spent providing counselling, discussing and reviewing medical conditions/concerns, meds and potential side effects.

## 2017-04-27 LAB
HCV AB SERPL QL IA: NORMAL
T4 FREE SERPL-MCNC: 1.47 NG/DL (ref 0.76–1.46)
TSH SERPL DL<=0.005 MIU/L-ACNC: 0.14 MU/L (ref 0.4–4)

## 2017-04-28 ENCOUNTER — OFFICE VISIT (OUTPATIENT)
Dept: OBGYN | Facility: CLINIC | Age: 72
End: 2017-04-28
Payer: COMMERCIAL

## 2017-04-28 ENCOUNTER — TELEPHONE (OUTPATIENT)
Dept: OBGYN | Facility: CLINIC | Age: 72
End: 2017-04-28

## 2017-04-28 VITALS
DIASTOLIC BLOOD PRESSURE: 68 MMHG | HEIGHT: 61 IN | BODY MASS INDEX: 31.53 KG/M2 | WEIGHT: 167 LBS | TEMPERATURE: 97.9 F | SYSTOLIC BLOOD PRESSURE: 138 MMHG

## 2017-04-28 DIAGNOSIS — N95.0 POSTMENOPAUSAL BLEEDING: Primary | ICD-10-CM

## 2017-04-28 PROCEDURE — 99213 OFFICE O/P EST LOW 20 MIN: CPT | Performed by: OBSTETRICS & GYNECOLOGY

## 2017-04-28 NOTE — MR AVS SNAPSHOT
"              After Visit Summary   4/28/2017    Abby Casey    MRN: [id number]           Patient Information     Date Of Birth          1945        Visit Information        Provider Department      4/28/2017 9:15 AM Negra Olivia MD [facility]        Today's Diagnoses     Postmenopausal bleeding    -  1       Follow-ups after your visit        Who to contact     If you have questions or need follow up information about today's clinic visit or your schedule please contact [facility] directly at [phone].  Normal or non-critical lab and imaging results will be communicated to you by MyChart, letter or phone within 4 business days after the clinic has received the results. If you do not hear from us within 7 days, please contact the clinic through [email or web address] or phone. If you have a critical or abnormal lab result, we will notify you by phone as soon as possible.  Submit refill requests through [email or web address] or call your pharmacy and they will forward the refill request to us. Please allow 3 business days for your refill to be completed.          Additional Information About Your Visit        MyChart Information     [email or web address] gives you secure access to your electronic health record. If you see a primary care provider, you can also send messages to your care team and make appointments. If you have questions, please call your primary care clinic.  If you do not have a primary care provider, please call [phone] and they will assist you.        Care EveryWhere ID     This is your Care EveryWhere ID. This could be used by other organizations to access your [facility] medical records  [id number]        Your Vitals Were     Temperature Height BMI (Body Mass Index)             97.9  F (36.6  C) (Oral) 5' 1\" (1.549 m) 31.55 kg/m2          Blood Pressure from Last 3 Encounters:   04/28/17 138/68   04/26/17 124/68   04/07/17 128/80    Weight from Last 3 Encounters:   04/28/17 167 lb " (75.8 kg)   04/26/17 167 lb 8 oz (76 kg)   04/07/17 170 lb 12.8 oz (77.5 kg)              Today, you had the following     No orders found for display       Primary Care Provider Office Phone # Fax #    Gela Bueno [phone]       [facility] [address]  [facility] [address]        Thank you!     Thank you for choosing [facility]  for your care. Our goal is always to provide you with excellent care. Hearing back from our patients is one way we can continue to improve our services. Please take a few minutes to complete the written survey that you may receive in the mail after your visit with us. Thank you!             Your Updated Medication List - Protect others around you: Learn how to safely use, store and throw away your medicines at www.disposemymeds.org.          This list is accurate as of: 4/28/17 11:58 AM.  Always use your most recent med list.                   Brand Name Dispense Instructions for use    aspirin 81 MG tablet     100    ONE DAILY       calcium + D 600-200 MG-UNIT Tabs   Generic drug:  calcium carbonate-vitamin D      1 TABLET DAILY       Chromium-Cinnamon 100-500 MCG-MG Caps      Take 1 tablet by mouth 2 times daily       clobetasol 0.05 % cream    TEMOVATE    15 g    use at bedtime weekly at bedtime for maintenance       guaiFENesin 600 MG 12 hr tablet    MUCINEX    40 tablet    Take 2 tablets by mouth 2 times daily as needed for congestion.       levothyroxine 125 MCG tablet    SYNTHROID/LEVOTHROID    90 tablet    Take 1 tablet (125 mcg) by mouth daily Profile Rx: patient will contact pharmacy when needed       MULTIVITAMIN PO      1 daily       NAPROSYN 500 MG tablet   Generic drug:  naproxen      1 TABLET TWICE DAILY PRN       PROSHIELD PLUS SKIN PROTECTANT 1 %   Generic drug:  dimethicone     1 tube    use as needed  up to QID       simvastatin 40 MG tablet    ZOCOR    90 tablet    Take 1 tablet (40 mg) by mouth At Bedtime

## 2017-04-28 NOTE — PROGRESS NOTES
"Gyn Clinic Visit:    CC: US results, post-coital bleeding    HPI: Abby Casey is a 72 year old female with new onset postmenopausal bleeding in March. No further bleeding since last visit. Overall feeling well. US with thickened EMS. Discussed possible etiologies including hyperplasia, polyp, malignancy, submucous fibroids.     O: /68 (BP Location: Left arm, Patient Position: Chair, Cuff Size: Adult Regular)  Temp 97.9  F (36.6  C) (Oral)  Ht 5' 1\" (1.549 m)  Wt 167 lb (75.8 kg)  BMI 31.55 kg/m2   Gen: resting comfortably, in NAD    No further exam indicated today, counseling visit only    Pelvic US:  Indications for ultrasound: Left adnexal pain,  Post coital bleeding      LMP: post menopausal 15+ years Hormones: none      Measurements:  Uterus: 4.9 x 2.2 x 3.3 cm.   Position is anteverted. Contour is smth/reg.      Endo cav: 6.3 mm Irregular and Prominent  Cervix: Wnl      Right ovary: Nv  Left ovary: Nv      Cul de sac: no free fluid      ===================================  Complete pelvic ultrasound utilizing both abdominal and vaginal transducers.      Prominent endometrial lining for postmenopausal status; endometrial stripe is 6.3 mm and heteroechoic. Consider further evaluation/sampling for evaluation of endometrial hyperplasia, endometrial cancer as clinically indicated. Alternately endometrial polyps or small submucous myoma may be also in differential; consider further evaluation as indicated.       A/P: Abby Casey is a 72 year old female P1, here to follow up of postmenopausal bleeding and US results.     1. Postmenopausal bleeding  - US with thickened endometrial stripe with irregularity, concerning for polyp, fibroid, hyperplasia, malignancy.   - discussed further evaluation including endometrial biopsy vs hysteroscopy. Patient has elected to proceed with hysteroscopy given ability to both evaluate and treat and for improved sampling. Plan diagnostic hysteroscopy, dilation and " curettage, possible morcellation if intrauterine pathology is noted. Procedure request placed today. Plan pre-op physical with PCP.     Total time spent was 15 minutes; greater than 50% of time was spent in counseling and/or coordination of care for the above listed diagnoses.     Negra Olivia MD

## 2017-04-28 NOTE — TELEPHONE ENCOUNTER
Surgeon:Negra Olivia MD   Assist:  No   [facility]   Date/time preference:  Per patient     Surgery:  Diagnostic hysteroscopy, dilation and curettage, possible morcelation   Length of Surgery:  45 min   Diagnosis:  Post menopausal bleeding, thickened endometrial stripe, possible endometrial polyp   Anesthesia type:  MAC     Special instructions / equipment:  Myosure, fluid management   Am admit or same day: SAME DAY   Bowel prep: No   Pre op: PCP   Office visit with surgeon prior to surgery: No

## 2017-04-28 NOTE — NURSING NOTE
"Chief Complaint   Patient presents with     Consult   US results.  Aleksandra Tierney MA      Initial /68 (BP Location: Left arm, Patient Position: Chair, Cuff Size: Adult Regular)  Temp 97.9  F (36.6  C) (Oral)  Ht 5' 1\" (1.549 m)  Wt 167 lb (75.8 kg)  BMI 31.55 kg/m2 Estimated body mass index is 31.55 kg/(m^2) as calculated from the following:    Height as of this encounter: 5' 1\" (1.549 m).    Weight as of this encounter: 167 lb (75.8 kg).  BP completed using cuff size: regular        The following HM Due: NONE                "

## 2017-05-01 NOTE — TELEPHONE ENCOUNTER
Surgery:  DIAGNOSTIC HYSTEROSCOPY, DILATION AND CURETTAGE, POSSIBLE MORCELATION  Date:  5/16/17  Time:  10:30 AM  Hospital:  [facility]    Patient advised of the following:  The hospital will contact you 24-48 hours prior to surgery to discuss any pre op instructions.  Contact your insurance company to see if a prior authorization or second opinion is needed.  Please schedule a pre op appointment with your primary physician within 7 days of surgery.  No aspirin or Ibuprofen 10 days prior to surgery.  Make arrangements to have someone drive you home from the hospital.    Surgery specific and hospital information mailed to patient.    Information was placed on the surgery calendar in [place].

## 2017-05-05 RX ORDER — LEVOTHYROXINE SODIUM 112 UG/1
112 TABLET ORAL DAILY
Qty: 90 TABLET | Refills: 0 | Status: SHIPPED | OUTPATIENT
Start: 2017-05-05 | End: 2017-07-31

## 2017-05-05 RX ORDER — LEVOTHYROXINE SODIUM 125 UG/1
125 TABLET ORAL DAILY
Qty: 90 TABLET | Refills: 3 | Status: SHIPPED | OUTPATIENT
Start: 2017-05-05 | End: 2017-05-05 | Stop reason: DRUGHIGH

## 2017-05-05 RX ORDER — SIMVASTATIN 40 MG
40 TABLET ORAL AT BEDTIME
Qty: 90 TABLET | Refills: 3 | Status: SHIPPED | OUTPATIENT
Start: 2017-05-05 | End: 2018-04-30

## 2017-05-05 NOTE — PROGRESS NOTES
Will review results at appt next week.  Recommend decrease dose of Levothyroxine 125 mcg to 112 mcg and then reassess labs in 6 weeks- future lab order placed    Pt advised via My Chart.

## 2017-05-11 ENCOUNTER — OFFICE VISIT (OUTPATIENT)
Dept: FAMILY MEDICINE | Facility: CLINIC | Age: 72
End: 2017-05-11
Payer: COMMERCIAL

## 2017-05-11 VITALS
OXYGEN SATURATION: 98 % | SYSTOLIC BLOOD PRESSURE: 128 MMHG | TEMPERATURE: 98.1 F | BODY MASS INDEX: 31.34 KG/M2 | DIASTOLIC BLOOD PRESSURE: 78 MMHG | HEIGHT: 61 IN | WEIGHT: 166 LBS | HEART RATE: 73 BPM | RESPIRATION RATE: 15 BRPM

## 2017-05-11 DIAGNOSIS — E03.9 HYPOTHYROIDISM, UNSPECIFIED TYPE: ICD-10-CM

## 2017-05-11 DIAGNOSIS — N95.0 POST-MENOPAUSAL BLEEDING: ICD-10-CM

## 2017-05-11 DIAGNOSIS — Z01.818 PREOP GENERAL PHYSICAL EXAM: Primary | ICD-10-CM

## 2017-05-11 PROCEDURE — 93000 ELECTROCARDIOGRAM COMPLETE: CPT | Performed by: INTERNAL MEDICINE

## 2017-05-11 PROCEDURE — 99214 OFFICE O/P EST MOD 30 MIN: CPT | Performed by: INTERNAL MEDICINE

## 2017-05-11 NOTE — MR AVS SNAPSHOT
After Visit Summary   5/11/2017    Abby Casey    MRN: [id number]           Patient Information     Date Of Birth          1945        Visit Information        Provider Department      5/11/2017 8:30 AM Gela Bueno MD [facility]        Today's Diagnoses     Preop general physical exam    -  1      Care Instructions    --Approval given to proceed with proposed procedure, without further diagnostic evaluation.  --Avoid NSAIDS (Motrin, Ibuprofen, Aleve or Naprosyn) one week prior to surgery; If needed, Tylenol or Acetaminophen are fine to use.  --Take all scheduled medications on the day of surgery.   --Pain medications, time off from work and FMLA following surgery deferred to surgeon.      Before Your Surgery      Call your surgeon if there is any change in your health. This includes signs of a cold or flu (such as a sore throat, runny nose, cough, rash or fever).    Do not smoke, drink alcohol or take over the counter medicine (unless your surgeon or primary care doctor tells you to) for the 24 hours before and after surgery.    If you take prescribed drugs: Follow your doctor s orders about which medicines to take and which to stop until after surgery.    Eating and drinking prior to surgery: follow the instructions from your surgeon    Take a shower or bath the night before surgery. Use the soap your surgeon gave you to gently clean your skin. If you do not have soap from your surgeon, use your regular soap. Do not shave or scrub the surgery site.  Wear clean pajamas and have clean sheets on your bed.         Follow-ups after your visit        Your next 10 appointments already scheduled     May 16, 2017   Procedure with Negra Olivia MD   [facility] (--)    [address] [facility] [address]   [phone]              Who to contact     If you have questions or need follow up information about today's clinic visit or your schedule  "please contact [facility] directly at [phone].  Normal or non-critical lab and imaging results will be communicated to you by MyChart, letter or phone within 4 business days after the clinic has received the results. If you do not hear from us within 7 days, please contact the clinic through [employer] or phone. If you have a critical or abnormal lab result, we will notify you by phone as soon as possible.  Submit refill requests through [email or web address] or call your pharmacy and they will forward the refill request to us. Please allow 3 business days for your refill to be completed.          Additional Information About Your Visit        [employer] Information     [email or web address] gives you secure access to your electronic health record. If you see a primary care provider, you can also send messages to your care team and make appointments. If you have questions, please call your primary care clinic.  If you do not have a primary care provider, please call [phone] and they will assist you.        Care EveryWhere ID     This is your Care EveryWhere ID. This could be used by other organizations to access your [place] medical records  [id number]        Your Vitals Were     Pulse Temperature Respirations Height Pulse Oximetry BMI (Body Mass Index)    73 98.1  F (36.7  C) (Oral) 15 5' 1\" (1.549 m) 98% 31.37 kg/m2       Blood Pressure from Last 3 Encounters:   05/11/17 128/78   04/28/17 138/68   04/26/17 124/68    Weight from Last 3 Encounters:   05/11/17 166 lb (75.3 kg)   04/28/17 167 lb (75.8 kg)   04/26/17 167 lb 8 oz (76 kg)              We Performed the Following     EKG 12-lead complete w/read - Clinics        Primary Care Provider Office Phone # Fax #    Gela Bueno [phone]       [facility] [address]  [facility] [address]        Thank you!     Thank you for choosing [facility]  for your care. Our goal is always to provide you with excellent " care. Hearing back from our patients is one way we can continue to improve our services. Please take a few minutes to complete the written survey that you may receive in the mail after your visit with us. Thank you!             Your Updated Medication List - Protect others around you: Learn how to safely use, store and throw away your medicines at www.disposemymeds.org.          This list is accurate as of: 5/11/17  9:06 AM.  Always use your most recent med list.                   Brand Name Dispense Instructions for use    aspirin 81 MG tablet     100    ONE DAILY       calcium + D 600-200 MG-UNIT Tabs   Generic drug:  calcium carbonate-vitamin D      1 TABLET DAILY       Chromium-Cinnamon 100-500 MCG-MG Caps      Take 1 tablet by mouth 2 times daily       clobetasol 0.05 % cream    TEMOVATE    15 g    use at bedtime weekly at bedtime for maintenance       guaiFENesin 600 MG 12 hr tablet    MUCINEX    40 tablet    Take 2 tablets by mouth 2 times daily as needed for congestion.       levothyroxine 112 MCG tablet    SYNTHROID/LEVOTHROID    90 tablet    Take 1 tablet (112 mcg) by mouth daily       MULTIVITAMIN PO      1 daily       NAPROSYN 500 MG tablet   Generic drug:  naproxen      1 TABLET TWICE DAILY PRN       PROSHIELD PLUS SKIN PROTECTANT 1 %   Generic drug:  dimethicone     1 tube    use as needed  up to QID       simvastatin 40 MG tablet    ZOCOR    90 tablet    Take 1 tablet (40 mg) by mouth At Bedtime

## 2017-05-11 NOTE — PATIENT INSTRUCTIONS
--Approval given to proceed with proposed procedure, without further diagnostic evaluation.  --Avoid NSAIDS (Motrin, Ibuprofen, Aleve or Naprosyn) one week prior to surgery; If needed, Tylenol or Acetaminophen are fine to use.  --Take all scheduled medications on the day of surgery (simvastatin, levothyroxine)  --Pain medications, time off from work and FMLA following surgery deferred to surgeon.      Before Your Surgery      Call your surgeon if there is any change in your health. This includes signs of a cold or flu (such as a sore throat, runny nose, cough, rash or fever).    Do not smoke, drink alcohol or take over the counter medicine (unless your surgeon or primary care doctor tells you to) for the 24 hours before and after surgery.    If you take prescribed drugs: Follow your doctor s orders about which medicines to take and which to stop until after surgery.    Eating and drinking prior to surgery: follow the instructions from your surgeon    Take a shower or bath the night before surgery. Use the soap your surgeon gave you to gently clean your skin. If you do not have soap from your surgeon, use your regular soap. Do not shave or scrub the surgery site.  Wear clean pajamas and have clean sheets on your bed.

## 2017-05-11 NOTE — NURSING NOTE
"Chief Complaint   Patient presents with     Pre-Op Exam       Initial /78 (BP Location: Right arm, Patient Position: Chair, Cuff Size: Adult Regular)  Pulse 73  Temp 98.1  F (36.7  C) (Oral)  Resp 15  Ht 5' 1\" (1.549 m)  Wt 166 lb (75.3 kg)  SpO2 98%  BMI 31.37 kg/m2 Estimated body mass index is 31.37 kg/(m^2) as calculated from the following:    Height as of this encounter: 5' 1\" (1.549 m).    Weight as of this encounter: 166 lb (75.3 kg).  Medication Reconciliation: complete    "

## 2017-05-15 ENCOUNTER — TELEPHONE (OUTPATIENT)
Dept: OBGYN | Facility: CLINIC | Age: 72
End: 2017-05-15

## 2017-05-15 NOTE — TELEPHONE ENCOUNTER
As long as she is not having fevers, chills, nausea, vomiting, or other signs of infection I think we can still proceed tomorrow. She is ok to continue pepto or imodium tonight but needs to be NPO for 8 hours (including no meds) prior to surgery.     Thanks,  Negra Olivia MD

## 2017-05-15 NOTE — TELEPHONE ENCOUNTER
Called pt and gave info.  She will call if she starts to have any sx of inf.  Aware to be NPO 8 hrs prior.  Ana Payan R.N.

## 2017-05-15 NOTE — H&P (VIEW-ONLY)
[facility]  [address] [facility] [address] [phone]  Dept: [phone]    PRE-OP EVALUATION:  Today's date: 2017    Abby Casey (: 1945) presents for pre-operative evaluation assessment as requested by Dr. Negra Olivia. She requires evaluation and anesthesia risk assessment prior to undergoing surgery/procedure for treatment of  Abnormal bleeding and pain. Proposed procedure: DILATION AND CURETTAGE, OPERATIVE HYSTEROSCOPY WITH MORCELLATOR     Date of Surgery/ Procedure: 17  Time of Surgery/ Procedure: 10:10 am  Hospital/Surgical Facility: [facility]  Fax number for surgical facility:   Primary Physician: Gela Bueno  Type of Anesthesia Anticipated: General    Patient has a Health Care Directive or Living Will: YES     Preop Questions 2017   1.  Do you have a history of heart attack, stroke, stent, bypass or surgery on an artery in the head, neck, heart or legs? No   2.  Do you ever have any pain or discomfort in your chest? No   3.  Do you have a history of  Heart Failure? No   4.   Are you troubled by shortness of breath when:  walking on a level surface, or up a slight hill, or at night? No   5.  Do you currently have a cold, bronchitis or other respiratory infection? No   6.  Do you have a cough, shortness of breath, or wheezing? No   7.  Do you sometimes get pains in the calves of your legs when you walk? No   8. Do you or anyone in your family have previous history of blood clots? No   9.  Do you or does anyone in your family have a serious bleeding problem such as prolonged bleeding following surgeries or cuts? No   10. Have you ever had problems with anemia or been told to take iron pills? No   11. Have you had any abnormal blood loss such as black, tarry or bloody stools, or abnormal vaginal bleeding? No   12. Have you ever had a blood transfusion? YES - no complications   13. Have you or any of your relatives ever had  problems with anesthesia? No   14. Do you have sleep apnea, excessive snoring or daytime drowsiness? No   15. Do you have any prosthetic heart valves? No   16. Do you have prosthetic joints? No   17. Is there any chance that you may be pregnant? No     HPI:                                                      Brief HPI related to upcoming procedure: Patient began to experience postmenopausal bleeding and left adnexal pain 3/2017. US showed thickened EMS with irregularity, concerning for endometrial polyp. Patient is to undergo a diagnostic hysteroscopy, dilation and curettage, possible morcellation.        See problem list for active medical problems. Problems all longstanding and stable, except as noted/documented. See ROS for pertinent symptoms related to these conditions.    HYPERLIPIDEMIA - Patient has a long history of significant Hyperlipidemia requiring medication for treatment with recent good control. Patient reports no problems or side effects with the medication.  Lab Results   Component Value Date    LDL 92 04/17/2017    LDL 77 10/05/2016     HYPOTHYROIDISM - Patient has a longstanding history of chronic Hypothyroidism. Patient has been doing well, noting no tremor, insomnia, hair loss or changes in skin texture. Continues to take medications as directed, without adverse reactions or side effects.   TSH   Date Value Ref Range Status   04/26/2017 0.14 (L) 0.40 - 4.00 mU/L Final     MEDICAL HISTORY:                                                      Patient Active Problem List    Diagnosis Date Noted     Obesity 05/19/2016     Priority: Medium     Adjustment disorder with anxious mood 12/17/2015     Priority: Medium     Right rotator cuff tear 08/10/2015     Priority: Medium     Rupture of right proximal biceps tendon 08/10/2015     Priority: Medium     Tendinopathy of right rotator cuff 08/10/2015     Priority: Medium     Osteoarthritis of right acromioclavicular joint 08/10/2015     Priority: Medium      Acute pain of right shoulder 07/22/2015     Priority: Medium     Advanced directives, counseling/discussion 04/04/2012     Discussed advance care planning with patient; information given to patient to review. 4/4/2012   Advance Care Planning:   Receipt of ACP document:  Received: Health Care Directive which was witnessed or notarized on 2/19/13.  Document not previously scanned.  Validation form completed and sent with document to be scanned.  Code Status reflects choices in most recent ACP document.  Confirmed/documented designated decision maker(s). See permanent comments section of demographics in clinical tab. View document(s) and details by clicking on code status.   Added by Dulce Dinh on 3/4/2013.               Papanicolaou smear of cervix with low grade squamous intraepithelial lesion (LGSIL) 04/04/2012 04/04/12 LSIL, low risk type HPV: Postmenopausal status, repeat in 1 year per Dr. Bueno  04/18/13 Pap=NIL. 1 year repeat   04/21/14 Pap= Normal. Repeat co-testing 1 yr.  04/23/15 Pap= Normal, HPV negative. Co-test 3 yrs       Elevated glucose 01/31/2012     elevated GLC, A1C; treat with diet and exercise. preDM classs helpful; will recheck Fall 2013       HYPERLIPIDEMIA LDL GOAL <130 02/10/2010     Lichen sclerosus et atrophicus of the vulva 03/18/2009     Diverticulosis of large intestine 01/31/2007     Problem list name updated by automated process. Provider to review       Disorder of bone and cartilage 01/23/2006     Problem list name updated by automated process. Provider to review       Abnormal blood chemistry 12/11/2002     elevated GLC, A1C; treat with diet and exercise. preDM classs info provided. She went and learned a few things; recheck in 3 months; if A1C>6, then Metformin recommended..  Problem list name updated by automated process. Provider to review       Hypothyroidism 12/09/2002     Problem list name updated by automated process. Provider to review        Past Medical History:    Diagnosis Date     Migraine, unspecified, without mention of intractable migraine without mention of status migrainosus      Other and unspecified hyperlipidemia 1990's    Pravachol, Zocor  Formulary issues, 6/02 Advicor     Unspecified hypothyroidism 1980's     Unspecified noninflammatory disorder of cervix     Atypical cervix - colposcopy 10/99     Past Surgical History:   Procedure Laterality Date     C NONSPECIFIC PROCEDURE  10/99    Colposcopy--reactive atypia         C REMOVAL GALLBLADDER  1980    [facility]       COLONOSCOPY      2006 and 2016     COLONOSCOPY N/A 11/16/2016    Procedure: COLONOSCOPY;  Surgeon: Adriano Roberto MD, MD;  Location:  GI     EYE SURGERY  2012    Bilateral cataract extraction     TUBAL LIGATION  1981     Current Outpatient Prescriptions   Medication Sig Dispense Refill     simvastatin (ZOCOR) 40 MG tablet Take 1 tablet (40 mg) by mouth At Bedtime 90 tablet 3     levothyroxine (SYNTHROID/LEVOTHROID) 112 MCG tablet Take 1 tablet (112 mcg) by mouth daily 90 tablet 0     clobetasol (TEMOVATE) 0.05 % cream use at bedtime weekly at bedtime for maintenance 15 g 1     guaiFENesin (MUCINEX) 600 MG 12 hr tablet Take 2 tablets by mouth 2 times daily as needed for congestion. 40 tablet 0     Chromium-Cinnamon 100-500 MCG-MG CAPS Take 1 tablet by mouth 2 times daily       NAPROSYN 500 MG OR TABS 1 TABLET TWICE DAILY PRN  1     PROSHIELD PLUS SKIN PROTECTANT 1 % EX CREA use as needed  up to QID 1 tube 1     CALCIUM + D 600-200 MG-UNIT OR TABS 1 TABLET DAILY       ASPIRIN 81 MG OR TABS ONE DAILY 100 3     MULTIVITAMIN OR 1 daily     OTC products: None, except as noted above    Allergies   Allergen Reactions     Neurontin [Gabapentin] Visual Disturbance     Amoxicillin Hives     Clindamycin Hcl Diarrhea   Latex Allergy: NO    Social History   Substance Use Topics     Smoking status: Never Smoker     Smokeless tobacco: Never Used     Alcohol use Yes      Comment: 3 drinks per month     History   Drug  "Use No     REVIEW OF SYSTEMS:                                                    CONSTITUTIONAL: NEGATIVE for fever, chills, change in weight  ENT/MOUTH: NEGATIVE for ear, mouth and throat problems  RESP:NEGATIVE for significant cough or SOB  CV: NEGATIVE for chest pain, palpitations or peripheral edema  GI: NEGATIVE for nausea, abdominal pain, heartburn, or change in bowel habits   female: Post-menopausal bleeding 3/2017, no bleeding since, US showed thickened EMS with irregularity - pt is to undergo diagnostic hysteroscopy, D&C, and possible morcellation   MUSCULOSKELETAL:NEGATIVE for significant arthralgias or myalgia  NEURO: NEGATIVE for weakness, dizziness or paresthesias  ENDOCRINE: NEGATIVE for temperature intolerance, skin/hair changes  HEME/ALLERGY/IMMUNE: NEGATIVE for bleeding problems  PSYCHIATRIC: NEGATIVE for changes in mood or affect    This document serves as a record of the services and decisions personally performed and made by Gela Bueno MD. It was created on her behalf by Lucretia Covarrubias, a trained medical scribe. The creation of this document is based on the provider's statements to the medical scribe.   Lucretia Covarrubias, 8:55 AM, May 11, 2017    EXAM:                                                    /78 (BP Location: Right arm, Patient Position: Chair, Cuff Size: Adult Regular)  Pulse 73  Temp 98.1  F (36.7  C) (Oral)  Resp 15  Ht 5' 1\" (1.549 m)  Wt 166 lb (75.3 kg)  SpO2 98%  BMI 31.37 kg/m2    GENERAL APPEARANCE: healthy, alert and no distress     HENT: ear canals and TM's normal, nose and mouth without ulcers or lesions, oral mucous membranes moist and oropharynx clear     NECK: no adenopathy and thyroid normal to palpation, no bruits      RESP: lungs clear to auscultation - no rales, rhonchi or wheezes     CV: regular rates and rhythm and normal S1 S2, no peripheral edema     ABDOMEN: soft, nontender, without hepatosplenomegaly or masses, aorta normal and bowel sounds normal     " MS: extremities normal- no gross deformities noted     SKIN: no suspicious lesions or rashes     NEURO: Normal strength and tone, sensory exam grossly normal, mentation intact and speech normal     PSYCH: mentation appears normal and affect normal/bright     LYMPHATICS: normal ant/post cervical, supraclavicular nodes    DIAGNOSTICS:                                                    EKG: appears normal, NSR, normal axis, normal intervals, no acute ST/T changes c/w ischemia, no LVH by voltage criteria    Recent Labs   Lab Test  04/17/17   0827  10/05/16   0838   12/16/11   1311   06/22/10   0804   HGB   --    --    --   14.5   --   13.9   PLT   --    --    --   220   --    --    NA  143  139   < >  139   < >  141   POTASSIUM  4.7  4.4   < >  4.4   < >  5.1   CR  0.61  0.60   < >  0.63   < >  0.74   A1C  5.9  5.8   < >   --    < >  5.8    < > = values in this interval not displayed.     IMPRESSION:                                                    Reason for surgery/procedure: Post menopausal bleeding, thickened endometrial stripe, possible endometrial polyp  Diagnosis/reason for consult:   Pre-operative evaluation assessment for diagnostic hysteroscopy, dilation and curettage, possible morcellation     The proposed surgical procedure is considered INTERMEDIATE risk.    REVISED CARDIAC RISK INDEX  The patient has the following serious cardiovascular risks for perioperative complications such as (MI, PE, VFib and 3  AV Block):  No serious cardiac risks  INTERPRETATION: 0 risks: Class I (very low risk - 0.4% complication rate)    The patient has the following additional risks for perioperative complications:  No identified additional risks      ICD-10-CM    1. Preop general physical exam Z01.818 EKG 12-lead complete w/read - Clinics   2. Post-menopausal bleeding N95.0     episode in March 2017, no recurrence;    3. Hypothyroidism, unspecified type E03.9     recent dose adjustment; will reassess labs in 6-8 weeks and  adjust or renew meds as indicated.     RECOMMENDATIONS:                                                      APPROVAL GIVEN to proceed with proposed procedure, without further diagnostic evaluation    --Pt advised to avoid NSAIDS (Motrin, Ibuprofen, Aleve or Naprosyn) one week prior to surgery; If needed, Tylenol or Acetaminophen are fine to use.  --Meds reviewed; Patient is to take all scheduled medications on the day of surgery with a tiny sip of water.  --Pain medications, time off from work and FMLA following surgery deferred to surgeon.  --See patient instructions.     Gela Bueno MD  Internal Medicine  [facility]    The information in this document, created by a medical scribe for me, accurately reflects the services I personally performed and the decisions made by me. I have reviewed and approved this document for accuracy.  Dr. Gela Bueno, 9:10 AM, May 11, 2017    Signed Electronically by: Gela Bueno MD    Copy of this evaluation report is provided to requesting physician.  [facility] Preop Guidelines

## 2017-05-15 NOTE — TELEPHONE ENCOUNTER
Pt is scheduled for hysteroscopy tomorrow.    Pt has hx of hard stools.  Pt has had very frequent BMs with softer stool starting Saturday (going 7-8 times that day).  Since then has developed diarrhea.  Pt took some Pepto capsules and that helped.  No cramping associated.      Today had diarrhea 3 times and took Pepto to control.  Wanted to to check in with you as surgery is tomorrow.  Please advise.    Ana Payan R.N.

## 2017-05-16 ENCOUNTER — HOSPITAL ENCOUNTER (OUTPATIENT)
Facility: CLINIC | Age: 72
Discharge: HOME | End: 2017-05-16
Attending: OBSTETRICS & GYNECOLOGY | Admitting: OBSTETRICS & GYNECOLOGY
Payer: COMMERCIAL

## 2017-05-16 ENCOUNTER — ANESTHESIA (OUTPATIENT)
Dept: SURGERY | Facility: CLINIC | Age: 72
End: 2017-05-16
Payer: COMMERCIAL

## 2017-05-16 ENCOUNTER — SURGERY (OUTPATIENT)
Age: 72
End: 2017-05-16

## 2017-05-16 ENCOUNTER — ANESTHESIA EVENT (OUTPATIENT)
Dept: SURGERY | Facility: CLINIC | Age: 72
End: 2017-05-16
Payer: COMMERCIAL

## 2017-05-16 VITALS
OXYGEN SATURATION: 96 % | HEIGHT: 61 IN | DIASTOLIC BLOOD PRESSURE: 69 MMHG | WEIGHT: 163.1 LBS | RESPIRATION RATE: 20 BRPM | BODY MASS INDEX: 30.79 KG/M2 | HEART RATE: 73 BPM | SYSTOLIC BLOOD PRESSURE: 137 MMHG | TEMPERATURE: 98.1 F

## 2017-05-16 PROCEDURE — 37000009 ZZH ANESTHESIA TECHNICAL FEE, EACH ADDTL 15 MIN: Performed by: OBSTETRICS & GYNECOLOGY

## 2017-05-16 PROCEDURE — 88305 TISSUE EXAM BY PATHOLOGIST: CPT | Performed by: OBSTETRICS & GYNECOLOGY

## 2017-05-16 PROCEDURE — 25000128 H RX IP 250 OP 636: Performed by: ANESTHESIOLOGY

## 2017-05-16 PROCEDURE — 88305 TISSUE EXAM BY PATHOLOGIST: CPT | Mod: 26 | Performed by: OBSTETRICS & GYNECOLOGY

## 2017-05-16 PROCEDURE — 36000058 ZZH SURGERY LEVEL 3 EA 15 ADDTL MIN: Performed by: OBSTETRICS & GYNECOLOGY

## 2017-05-16 PROCEDURE — 25000128 H RX IP 250 OP 636: Performed by: NURSE ANESTHETIST, CERTIFIED REGISTERED

## 2017-05-16 PROCEDURE — 27210794 ZZH OR GENERAL SUPPLY STERILE: Performed by: OBSTETRICS & GYNECOLOGY

## 2017-05-16 PROCEDURE — 36000056 ZZH SURGERY LEVEL 3 1ST 30 MIN: Performed by: OBSTETRICS & GYNECOLOGY

## 2017-05-16 PROCEDURE — 25000132 ZZH RX MED GY IP 250 OP 250 PS 637: Performed by: ANESTHESIOLOGY

## 2017-05-16 PROCEDURE — 40000306 ZZH STATISTIC PRE PROC ASSESS II: Performed by: OBSTETRICS & GYNECOLOGY

## 2017-05-16 PROCEDURE — 25000125 ZZHC RX 250: Performed by: NURSE ANESTHETIST, CERTIFIED REGISTERED

## 2017-05-16 PROCEDURE — 58558 HYSTEROSCOPY BIOPSY: CPT | Performed by: OBSTETRICS & GYNECOLOGY

## 2017-05-16 PROCEDURE — 25000125 ZZHC RX 250: Performed by: OBSTETRICS & GYNECOLOGY

## 2017-05-16 PROCEDURE — 71000027 ZZH RECOVERY PHASE 2 EACH 15 MINS: Performed by: OBSTETRICS & GYNECOLOGY

## 2017-05-16 PROCEDURE — 37000008 ZZH ANESTHESIA TECHNICAL FEE, 1ST 30 MIN: Performed by: OBSTETRICS & GYNECOLOGY

## 2017-05-16 RX ORDER — ONDANSETRON 2 MG/ML
4 INJECTION INTRAMUSCULAR; INTRAVENOUS EVERY 30 MIN PRN
Status: DISCONTINUED | OUTPATIENT
Start: 2017-05-16 | End: 2017-05-16 | Stop reason: HOSPADM

## 2017-05-16 RX ORDER — PROPOFOL 10 MG/ML
INJECTION, EMULSION INTRAVENOUS CONTINUOUS PRN
Status: DISCONTINUED | OUTPATIENT
Start: 2017-05-16 | End: 2017-05-16

## 2017-05-16 RX ORDER — NALOXONE HYDROCHLORIDE 0.4 MG/ML
.1-.4 INJECTION, SOLUTION INTRAMUSCULAR; INTRAVENOUS; SUBCUTANEOUS
Status: DISCONTINUED | OUTPATIENT
Start: 2017-05-16 | End: 2017-05-16 | Stop reason: HOSPADM

## 2017-05-16 RX ORDER — HYDROMORPHONE HYDROCHLORIDE 1 MG/ML
.3-.5 INJECTION, SOLUTION INTRAMUSCULAR; INTRAVENOUS; SUBCUTANEOUS EVERY 10 MIN PRN
Status: DISCONTINUED | OUTPATIENT
Start: 2017-05-16 | End: 2017-05-16 | Stop reason: HOSPADM

## 2017-05-16 RX ORDER — LIDOCAINE 40 MG/G
CREAM TOPICAL
Status: DISCONTINUED | OUTPATIENT
Start: 2017-05-16 | End: 2017-05-16 | Stop reason: HOSPADM

## 2017-05-16 RX ORDER — FENTANYL CITRATE 50 UG/ML
25-50 INJECTION, SOLUTION INTRAMUSCULAR; INTRAVENOUS
Status: DISCONTINUED | OUTPATIENT
Start: 2017-05-16 | End: 2017-05-16 | Stop reason: HOSPADM

## 2017-05-16 RX ORDER — SODIUM CHLORIDE, SODIUM LACTATE, POTASSIUM CHLORIDE, CALCIUM CHLORIDE 600; 310; 30; 20 MG/100ML; MG/100ML; MG/100ML; MG/100ML
INJECTION, SOLUTION INTRAVENOUS CONTINUOUS
Status: DISCONTINUED | OUTPATIENT
Start: 2017-05-16 | End: 2017-05-16 | Stop reason: HOSPADM

## 2017-05-16 RX ORDER — LIDOCAINE HYDROCHLORIDE 10 MG/ML
INJECTION, SOLUTION EPIDURAL; INFILTRATION; INTRACAUDAL; PERINEURAL PRN
Status: DISCONTINUED | OUTPATIENT
Start: 2017-05-16 | End: 2017-05-16 | Stop reason: HOSPADM

## 2017-05-16 RX ORDER — FENTANYL CITRATE 50 UG/ML
INJECTION, SOLUTION INTRAMUSCULAR; INTRAVENOUS PRN
Status: DISCONTINUED | OUTPATIENT
Start: 2017-05-16 | End: 2017-05-16

## 2017-05-16 RX ORDER — ACETAMINOPHEN 500 MG
1000 TABLET ORAL ONCE
Status: COMPLETED | OUTPATIENT
Start: 2017-05-16 | End: 2017-05-16

## 2017-05-16 RX ORDER — LIDOCAINE HYDROCHLORIDE 10 MG/ML
INJECTION, SOLUTION INFILTRATION; PERINEURAL PRN
Status: DISCONTINUED | OUTPATIENT
Start: 2017-05-16 | End: 2017-05-16

## 2017-05-16 RX ORDER — PROPOFOL 10 MG/ML
INJECTION, EMULSION INTRAVENOUS PRN
Status: DISCONTINUED | OUTPATIENT
Start: 2017-05-16 | End: 2017-05-16

## 2017-05-16 RX ORDER — OXYCODONE HYDROCHLORIDE 5 MG/1
5-10 TABLET ORAL
Status: DISCONTINUED | OUTPATIENT
Start: 2017-05-16 | End: 2017-05-16 | Stop reason: HOSPADM

## 2017-05-16 RX ORDER — MEPERIDINE HYDROCHLORIDE 25 MG/ML
12.5 INJECTION INTRAMUSCULAR; INTRAVENOUS; SUBCUTANEOUS
Status: DISCONTINUED | OUTPATIENT
Start: 2017-05-16 | End: 2017-05-16 | Stop reason: HOSPADM

## 2017-05-16 RX ORDER — ONDANSETRON 4 MG/1
4 TABLET, ORALLY DISINTEGRATING ORAL EVERY 30 MIN PRN
Status: DISCONTINUED | OUTPATIENT
Start: 2017-05-16 | End: 2017-05-16 | Stop reason: HOSPADM

## 2017-05-16 RX ORDER — ACETAMINOPHEN 325 MG/1
650 TABLET ORAL
Status: DISCONTINUED | OUTPATIENT
Start: 2017-05-16 | End: 2017-05-16 | Stop reason: HOSPADM

## 2017-05-16 RX ORDER — HYDRALAZINE HYDROCHLORIDE 20 MG/ML
2.5-5 INJECTION INTRAMUSCULAR; INTRAVENOUS EVERY 10 MIN PRN
Status: DISCONTINUED | OUTPATIENT
Start: 2017-05-16 | End: 2017-05-16 | Stop reason: HOSPADM

## 2017-05-16 RX ADMIN — FENTANYL CITRATE 50 MCG: 50 INJECTION, SOLUTION INTRAMUSCULAR; INTRAVENOUS at 10:42

## 2017-05-16 RX ADMIN — MIDAZOLAM HYDROCHLORIDE 2 MG: 1 INJECTION, SOLUTION INTRAMUSCULAR; INTRAVENOUS at 10:28

## 2017-05-16 RX ADMIN — PROPOFOL 20 MG: 10 INJECTION, EMULSION INTRAVENOUS at 10:40

## 2017-05-16 RX ADMIN — LIDOCAINE HYDROCHLORIDE 16 ML: 10 INJECTION, SOLUTION EPIDURAL; INFILTRATION; INTRACAUDAL; PERINEURAL at 11:18

## 2017-05-16 RX ADMIN — ACETAMINOPHEN 1000 MG: 500 TABLET, FILM COATED ORAL at 10:16

## 2017-05-16 RX ADMIN — LIDOCAINE HYDROCHLORIDE 50 MG: 10 INJECTION, SOLUTION INFILTRATION; PERINEURAL at 10:38

## 2017-05-16 RX ADMIN — FENTANYL CITRATE 50 MCG: 50 INJECTION, SOLUTION INTRAMUSCULAR; INTRAVENOUS at 10:33

## 2017-05-16 RX ADMIN — SODIUM CHLORIDE, POTASSIUM CHLORIDE, SODIUM LACTATE AND CALCIUM CHLORIDE: 600; 310; 30; 20 INJECTION, SOLUTION INTRAVENOUS at 10:28

## 2017-05-16 RX ADMIN — PROPOFOL 50 MCG/KG/MIN: 10 INJECTION, EMULSION INTRAVENOUS at 10:40

## 2017-05-16 NOTE — DISCHARGE INSTRUCTIONS
GENERAL ANESTHESIA OR SEDATION ADULT DISCHARGE INSTRUCTIONS   SPECIAL PRECAUTIONS FOR 24 HOURS AFTER SURGERY    IT IS NOT UNUSUAL TO FEEL LIGHT-HEADED OR FAINT, UP TO 24 HOURS AFTER SURGERY OR WHILE TAKING PAIN MEDICATION.  IF YOU HAVE THESE SYMPTOMS; SIT FOR A FEW MINUTES BEFORE STANDING AND HAVE SOMEONE ASSIST YOU WHEN YOU GET UP TO WALK OR USE THE BATHROOM.    YOU SHOULD REST AND RELAX FOR THE NEXT 24 HOURS AND YOU MUST MAKE ARRANGEMENTS TO HAVE SOMEONE STAY WITH YOU FOR AT LEAST 24 HOURS AFTER YOUR DISCHARGE.  AVOID HAZARDOUS AND STRENUOUS ACTIVITIES.  DO NOT MAKE IMPORTANT DECISIONS FOR 24 HOURS.    DO NOT DRIVE ANY VEHICLE OR OPERATE MECHANICAL EQUIPMENT FOR 24 HOURS FOLLOWING THE END OF YOUR SURGERY.  EVEN THOUGH YOU MAY FEEL NORMAL, YOUR REACTIONS MAY BE AFFECTED BY THE MEDICATION YOU HAVE RECEIVED.    DO NOT DRINK ALCOHOLIC BEVERAGES FOR 24 HOURS FOLLOWING YOUR SURGERY.    DRINK CLEAR LIQUIDS (APPLE JUICE, GINGER ALE, 7-UP, BROTH, ETC.).  PROGRESS TO YOUR REGULAR DIET AS YOU FEEL ABLE.    YOU MAY HAVE A DRY MOUTH, A SORE THROAT, MUSCLES ACHES OR TROUBLE SLEEPING.  THESE SHOULD GO AWAY AFTER 24 HOURS.    CALL YOUR DOCTOR FOR ANY OF THE FOLLOWING:  SIGNS OF INFECTION (FEVER, GROWING TENDERNESS AT THE SURGERY SITE, A LARGE AMOUNT OF DRAINAGE OR BLEEDING, SEVERE PAIN, FOUL-SMELLING DRAINAGE, REDNESS OR SWELLING.    IT HAS BEEN OVER 8 TO 10 HOURS SINCE SURGERY AND YOU ARE STILL NOT ABLE TO URINATE (PASS WATER).     DILATION AND CURETTAGE AND DILATION AND EVACUATION DISCHARGE INSTRUCTIONS    PLEASE RETURN TO THE CLINIC IN:  ____1 WEEK  ____2 WEEKS  ____4 WEEKS  ____6 WEEKS  MAKE THIS APPOINTMENT AFTER YOU GET HOME IF IT HAS NOT ALREADY BEEN SCHEDULED.    DO NOT DRIVE A CAR, DRINK ALCOHOL OR USE MACHINERY FOR THE NEXT 24 HOURS.  YOU SHOULD WAIT UNTIL YOU HAVE RECOVERED BEFORE MAKING ANY IMPORTANT DECISIONS.    PAIN AND DISCOMFORT  YOU MAY HAVE CRAMPS OR A LOW BACKACHE FOR 24 TO 48 HOURS.  TYLENOL (ACETAMINOPHEN)  OR MOTRIN (IBUPROFEN) MAY HELP, OR YOUR DOCTOR MAY GIVE YOU PAIN MEDICINE.  CALL YOUR DOCTOR IF PAIN CANNOT BE CONTROLLED.  YOU MAY FEEL DROWSY AND WEAK FOR A DAY OR TWO.    VAGINAL DISCHARGE  YOU MAY HAVE SOME BLEEDING OR DISCHARGE FOR UP TO TWO WEEKS.  DO NOT DOUCHE, USE TAMPONS OR HAVE SEX (INTERCOURSE) IN THE FIRST WEEK.  CALL YOUR DOCTOR IF YOU SOAK MORE THAN ONE MAXI PAD (SANITARY NAPKIN) PER HOUR, OR IF YOU PASS LARGE BLOOD CLOTS.    OTHER SYMPTOMS  YOU MAY HAVE A LOW FEVER FOR THE FIRST TWO DAYS.  CALL YOUR DOCTOR IF YOUR FEVER GOES OVER 101 DEGREES FAHRENHEIT.    IF YOU HAVE NAUSEA (FEEL SICK TO YOUR STOMACH), STAY IN BED.  TRY DRINKING A SMALL AMOUNT 7-UP, TEA OR SOUP.    DIET AND ACTIVITY  EAT LIGHT MEALS AND DRINK PLENTY OF FLUIDS FOR THE FIRST 24 HOURS (OR LONGER, IF YOU HAVE NAUSEA).    YOU MAY BATHE, SHOWER AND CLIMB STAIRS.  MOST WOMEN CAN RETURN TO WORK AFTER 24 HOURS.  YOU MAY GO BACK TO YOUR OTHER ACTIVITIES AFTER YOUR PAIN GOES AWAY.          Maximum acetaminophen (Tylenol) dose from all sources should not exceed 4 grams (4000 mg) per day. You had 1000mg  At 10:15am

## 2017-05-16 NOTE — ANESTHESIA CARE TRANSFER NOTE
Patient: Abby Casey    Procedure(s):   DILATION AND CURETTAGE, OPERATIVE HYSTEROSCOPY WITH MORCELLATOR - Wound Class: II-Clean Contaminated    Diagnosis: post menopausal bleeding, thickened endometrial stripe, possible polyp  Diagnosis Additional Information: No value filed.    Anesthesia Type:   General, LMA     Note:  Airway :Room Air  Patient transferred to:Phase II  Comments: Patients meets criteria for phase 2 recovery. VSS. Report to RN      Vitals: (Last set prior to Anesthesia Care Transfer)    CRNA VITALS  5/16/2017 1050 - 5/16/2017 1128      5/16/2017             NIBP: 123/79    Pulse: 74    NIBP Mean: 102    SpO2: 95 %                Electronically Signed By: AMADO Naylor CRNA  May 16, 2017  11:28 AM

## 2017-05-16 NOTE — OP NOTE
Operative Note    Patient: Abby Casey  : 1945  MRN: [id number]    Date of Service: 2017    Pre-operative diagnosis:  1. Postmenopausal bleeding  2. Thickened and irregular endometrial stripe    Post-operative diagnosis:  1. Postmenopausal bleeding  2. Endometrial polyp    Procedure:   1. Exam under anesthesia  2. Operative hysteroscopy with morcellation of polyp  3. dilation and curettage    Surgeon: Negra Olivia MD     Anesthesia: Local with MAC    EBL: 5 mL  Urine: not measured  Fluids: 600 cystalloid    Specimens: Endometrial curettings, endometrial polyp  Complications: none    Findings: EUA revealed normal cervix and anteverted uterus, no adnexal masses.  Uterus sounded to 6cm. Uterine cavity with large polyp eminating from the fundus near the right ostium. Left ostium not visualized.    Indications: Abby Casey is a 72 year old female who developed postmenopausal bleeding in March of this year. US revealed 4.9 x 2.2 x 3.3 cm uterus with smooth contour and irregular prominent endometrial stripe of 6.3mm. Given these findings I recommended a thorough evaluation and sampling of her uterus. Risks, benefits, and alternatives to the procedure were discussed. The patient's questions were answered, understanding confirmed, and the patient signed written informed consent.    Technique: The patient was taken to the operating room where she was placed in the dorsal lithotomy position with feet in yellow fin stirrups. The patient was placed under MAC anesthesia. Patient safety time out was then performed. An exam under anesthesia was performed with the above noted findings. The patient was prepped and draped in the usual sterile fashion. A speculum was placed in the vagina and the cervix visualized. A single-toothed tenaculum was placed on the anterior lip of the cervix at 12 o'clock after injection of 1cc of local at that site. A paracevical block was performed with 9cc of plain lidocaine  each at 5 and 8 o'clock. The cervical os was carefully dilated to 6 mm with sequential dilators. The operating hysteroscope was introduced through the cervix into the uterine cavity. Examination of the uterine cavity demonstrated a large polyp as noted above. The remainder of the cavity was unremarkable. Pictures were taken. The hysteroscopic morcellator was used to remove the polyp with good success. Pictures were taken. The hysteroscope apparatus was removed and total of 630 mL fluid deficit of normal saline noted. A sharp curet was used to scrape the uterus which was gritty on all aspects and with minimal return of tissue. The curetings were sent to pathology. The tenaculum was removed from the cervix and good hemostasis was noted. The speculum was removed from the vagina.    Instrument counts were correct x2. The patient was awoken in the OR and transferred to the PACU in stable condition.    Negra Olivia

## 2017-05-16 NOTE — ANESTHESIA POSTPROCEDURE EVALUATION
Patient: Abby Casey    Procedure(s):   DILATION AND CURETTAGE, OPERATIVE HYSTEROSCOPY WITH MORCELLATOR - Wound Class: II-Clean Contaminated    Diagnosis:post menopausal bleeding, thickened endometrial stripe, possible polyp  Diagnosis Additional Information: 1. Postmenopausal bleeding  2. Endometrial polyp    Anesthesia Type:  General, LMA    Note:  Anesthesia Post Evaluation    Patient location during evaluation: PACU  Patient participation: Able to fully participate in evaluation  Level of consciousness: awake and alert  Pain management: adequate  Airway patency: patent  Cardiovascular status: acceptable  Respiratory status: acceptable  Hydration status: acceptable  PONV: none     Anesthetic complications: None          Last vitals:  Vitals:    05/16/17 1130 05/16/17 1138 05/16/17 1221   BP: 137/77 134/80 137/69   Pulse:  73    Resp: 14 16 20   Temp:  98.1  F (36.7  C) 98.1  F (36.7  C)   SpO2: 98% 95% 96%         Electronically Signed By: David Cao MD  May 16, 2017  3:20 PM

## 2017-05-16 NOTE — IP AVS SNAPSHOT
[facility] PreOP/PostOP    [address]    [facility] [address]    Phone:  [phone]    Fax:  [phone]                                       After Visit Summary   5/16/2017    Abby Casey    MRN: [id number]           After Visit Summary Signature Page     I have received my discharge instructions, and my questions have been answered. I have discussed any challenges I see with this plan with the nurse or doctor.    ..........................................................................................................................................  Patient/Patient Representative Signature      ..........................................................................................................................................  Patient Representative Print Name and Relationship to Patient    ..................................................               ................................................  Date                                            Time    ..........................................................................................................................................  Reviewed by Signature/Title    ...................................................              ..............................................  Date                                                            Time

## 2017-05-16 NOTE — ANESTHESIA PREPROCEDURE EVALUATION
Anesthesia Evaluation     . Pt has had prior anesthetic. Type: General    No history of anesthetic complications          ROS/MED HX    ENT/Pulmonary:  - neg pulmonary ROS     Neurologic:     (+)migraines,     Cardiovascular:  - neg cardiovascular ROS       METS/Exercise Tolerance:     Hematologic:  - neg hematologic  ROS       Musculoskeletal:  - neg musculoskeletal ROS       GI/Hepatic:  - neg GI/hepatic ROS       Renal/Genitourinary:  - ROS Renal section negative       Endo:     (+) thyroid problem hypothyroidism, Obesity, .      Psychiatric:  - neg psychiatric ROS       Infectious Disease:  - neg infectious disease ROS       Malignancy:      - no malignancy   Other:    - neg other ROS                 Physical Exam  Normal systems: cardiovascular, pulmonary and dental    Airway   Mallampati: III  TM distance: >3 FB  Neck ROM: full    Dental     Cardiovascular       Pulmonary                     Anesthesia Plan      History & Physical Review  History and physical reviewed and following examination; no interval change.    ASA Status:  1 .    NPO Status:  > 8 hours    Plan for General and LMA with Intravenous and Propofol induction. Maintenance will be Balanced.    PONV prophylaxis:  Ondansetron (or other 5HT-3) and Dexamethasone or Solumedrol       Postoperative Care  Postoperative pain management:  IV analgesics and Oral pain medications.      Consents  Anesthetic plan, risks, benefits and alternatives discussed with:  Patient or representative..                          .

## 2017-05-16 NOTE — IP AVS SNAPSHOT
MRN:[id number]                      After Visit Summary   5/16/2017    Abby Casey    MRN: [id number]           Thank you!     Thank you for choosing [facility] for your care. Our goal is always to provide you with excellent care. Hearing back from our patients is one way we can continue to improve our services. Please take a few minutes to complete the written survey that you may receive in the mail after you visit. If you would like to speak to someone directly about your visit please contact Patient Relations at [phone]. Thank you!          Patient Information     Date Of Birth          1945        About your hospital stay     You were admitted on:  May 16, 2017 You last received care in the:  [facility] PreOP/PostOP    You were discharged on:  May 16, 2017       Who to Call     For medical emergencies, please call 911.  For non-urgent questions about your medical care, please call your primary care provider or clinic, [phone]  For questions related to your surgery, please call your surgery clinic        Attending Provider     Provider Specialty    Negra Olivia MD OB/Gyn       Primary Care Provider Office Phone # Fax #    Gela Bradly Bueno [phone]       [facility] [address] [facility] [address]        After Care Instructions     Discharge Instructions       Resume pre procedure diet            Discharge Instructions       Pelvic Rest. No tampons, douching or intercourse for  2  weeks.            Discharge Instructions       Patient to arrange follow up appointment in 2-4  Weeks    You will likely experience cramping for 1-2 days.   You can take up to 3 tabs of ibuprofen (600mg) every 6 hours as needed for pain.   You can additionally take 1-2 tabs of tylenol (325-650mg regular strength 500-1000mg extra strength), every 6 hours for additional pain control as needed.  It is best to alternate your medications  so you are taking something every 3 hours if you are having continued pain.    If you have vaginal pain you can take sitz baths 1-2x/day. Fill the tub with 2-3 inches of warm water and soak the perineal and vaginal area for 10 minutes. Ice or warm packs can also be applied for comfort.            No alcohol       NO ALCOHOL for 24 hours post procedure            No driving or operating machinery       No driving or operating machinery until day after procedure                  Further instructions from your care team       GENERAL ANESTHESIA OR SEDATION ADULT DISCHARGE INSTRUCTIONS   SPECIAL PRECAUTIONS FOR 24 HOURS AFTER SURGERY    IT IS NOT UNUSUAL TO FEEL LIGHT-HEADED OR FAINT, UP TO 24 HOURS AFTER SURGERY OR WHILE TAKING PAIN MEDICATION.  IF YOU HAVE THESE SYMPTOMS; SIT FOR A FEW MINUTES BEFORE STANDING AND HAVE SOMEONE ASSIST YOU WHEN YOU GET UP TO WALK OR USE THE BATHROOM.    YOU SHOULD REST AND RELAX FOR THE NEXT 24 HOURS AND YOU MUST MAKE ARRANGEMENTS TO HAVE SOMEONE STAY WITH YOU FOR AT LEAST 24 HOURS AFTER YOUR DISCHARGE.  AVOID HAZARDOUS AND STRENUOUS ACTIVITIES.  DO NOT MAKE IMPORTANT DECISIONS FOR 24 HOURS.    DO NOT DRIVE ANY VEHICLE OR OPERATE MECHANICAL EQUIPMENT FOR 24 HOURS FOLLOWING THE END OF YOUR SURGERY.  EVEN THOUGH YOU MAY FEEL NORMAL, YOUR REACTIONS MAY BE AFFECTED BY THE MEDICATION YOU HAVE RECEIVED.    DO NOT DRINK ALCOHOLIC BEVERAGES FOR 24 HOURS FOLLOWING YOUR SURGERY.    DRINK CLEAR LIQUIDS (APPLE JUICE, GINGER ALE, 7-UP, BROTH, ETC.).  PROGRESS TO YOUR REGULAR DIET AS YOU FEEL ABLE.    YOU MAY HAVE A DRY MOUTH, A SORE THROAT, MUSCLES ACHES OR TROUBLE SLEEPING.  THESE SHOULD GO AWAY AFTER 24 HOURS.    CALL YOUR DOCTOR FOR ANY OF THE FOLLOWING:  SIGNS OF INFECTION (FEVER, GROWING TENDERNESS AT THE SURGERY SITE, A LARGE AMOUNT OF DRAINAGE OR BLEEDING, SEVERE PAIN, FOUL-SMELLING DRAINAGE, REDNESS OR SWELLING.    IT HAS BEEN OVER 8 TO 10 HOURS SINCE SURGERY AND YOU ARE STILL NOT ABLE TO  URINATE (PASS WATER).     DILATION AND CURETTAGE AND DILATION AND EVACUATION DISCHARGE INSTRUCTIONS    PLEASE RETURN TO THE CLINIC IN:  ____1 WEEK  ____2 WEEKS  ____4 WEEKS  ____6 WEEKS  MAKE THIS APPOINTMENT AFTER YOU GET HOME IF IT HAS NOT ALREADY BEEN SCHEDULED.    DO NOT DRIVE A CAR, DRINK ALCOHOL OR USE MACHINERY FOR THE NEXT 24 HOURS.  YOU SHOULD WAIT UNTIL YOU HAVE RECOVERED BEFORE MAKING ANY IMPORTANT DECISIONS.    PAIN AND DISCOMFORT  YOU MAY HAVE CRAMPS OR A LOW BACKACHE FOR 24 TO 48 HOURS.  TYLENOL (ACETAMINOPHEN) OR MOTRIN (IBUPROFEN) MAY HELP, OR YOUR DOCTOR MAY GIVE YOU PAIN MEDICINE.  CALL YOUR DOCTOR IF PAIN CANNOT BE CONTROLLED.  YOU MAY FEEL DROWSY AND WEAK FOR A DAY OR TWO.    VAGINAL DISCHARGE  YOU MAY HAVE SOME BLEEDING OR DISCHARGE FOR UP TO TWO WEEKS.  DO NOT DOUCHE, USE TAMPONS OR HAVE SEX (INTERCOURSE) IN THE FIRST WEEK.  CALL YOUR DOCTOR IF YOU SOAK MORE THAN ONE MAXI PAD (SANITARY NAPKIN) PER HOUR, OR IF YOU PASS LARGE BLOOD CLOTS.    OTHER SYMPTOMS  YOU MAY HAVE A LOW FEVER FOR THE FIRST TWO DAYS.  CALL YOUR DOCTOR IF YOUR FEVER GOES OVER 101 DEGREES FAHRENHEIT.    IF YOU HAVE NAUSEA (FEEL SICK TO YOUR STOMACH), STAY IN BED.  TRY DRINKING A SMALL AMOUNT 7-UP, TEA OR SOUP.    DIET AND ACTIVITY  EAT LIGHT MEALS AND DRINK PLENTY OF FLUIDS FOR THE FIRST 24 HOURS (OR LONGER, IF YOU HAVE NAUSEA).    YOU MAY BATHE, SHOWER AND CLIMB STAIRS.  MOST WOMEN CAN RETURN TO WORK AFTER 24 HOURS.  YOU MAY GO BACK TO YOUR OTHER ACTIVITIES AFTER YOUR PAIN GOES AWAY.          Maximum acetaminophen (Tylenol) dose from all sources should not exceed 4 grams (4000 mg) per day. You had 1000mg  At 10:15am            Pending Results     Date and Time Order Name Status Description    5/16/2017 1106 Surgical pathology exam In process             Admission Information     Date & Time Provider Department Dept. Phone    5/16/2017 Negra Olivia MD [facility] PreOP/PostOP [phone]      Your Vitals Were      "Blood Pressure Pulse Temperature Respirations Height Weight    134/80 73 98.1  F (36.7  C) (Temporal) 16 1.549 m (5' 1\") 74 kg (163 lb 1.6 oz)    Pulse Oximetry BMI (Body Mass Index)                95% 30.82 kg/m2          [employer] Information     "[employer] gives you secure access to your electronic health record. If you see a primary care provider, you can also send messages to your care team and make appointments. If you have questions, please call your primary care clinic.  If you do not have a primary care provider, please call [phone] and they will assist you.        Care EveryWhere ID     This is your Care EveryWhere ID. This could be used by other organizations to access your [facility] medical records  [id number]           Review of your medicines      CONTINUE these medicines which have NOT CHANGED        Dose / Directions    aspirin 81 MG tablet   Used for:  Other and unspecified hyperlipidemia        ONE DAILY   Quantity:  100   Refills:  3       calcium + D 600-200 MG-UNIT Tabs   Generic drug:  calcium carbonate-vitamin D        1 TABLET DAILY   Refills:  0       Chromium-Cinnamon 100-500 MCG-MG Caps        Dose:  1 tablet   Take 1 tablet by mouth 2 times daily   Refills:  0       clobetasol 0.05 % cream   Commonly known as:  TEMOVATE   Used for:  Lichen sclerosus et atrophicus of the vulva        use at bedtime weekly at bedtime for maintenance   Quantity:  15 g   Refills:  1       guaiFENesin 600 MG 12 hr tablet   Commonly known as:  MUCINEX   Used for:  URI (upper respiratory infection)        Dose:  1200 mg   Take 2 tablets by mouth 2 times daily as needed for congestion.   Quantity:  40 tablet   Refills:  0       levothyroxine 112 MCG tablet   Commonly known as:  SYNTHROID/LEVOTHROID   Used for:  Hypothyroidism, unspecified type        Dose:  112 mcg   Take 1 tablet (112 mcg) by mouth daily   Quantity:  90 tablet   Refills:  0       MULTIVITAMIN PO        1 daily   Refills:  0       NAPROSYN 500 " MG tablet   Used for:  Myalgia and myositis, unspecified   Generic drug:  naproxen        1 TABLET TWICE DAILY PRN   Refills:  1       PROSHIELD PLUS SKIN PROTECTANT 1 %   Used for:  Vulvar dystrophy   Generic drug:  dimethicone        use as needed  up to QID   Quantity:  1 tube   Refills:  1       simvastatin 40 MG tablet   Commonly known as:  ZOCOR   Used for:  Hyperlipidemia LDL goal <130        Dose:  40 mg   Take 1 tablet (40 mg) by mouth At Bedtime   Quantity:  90 tablet   Refills:  3                Protect others around you: Learn how to safely use, store and throw away your medicines at www.disposemymeds.org.             Medication List: This is a list of all your medications and when to take them. Check marks below indicate your daily home schedule. Keep this list as a reference.      Medications           Morning Afternoon Evening Bedtime As Needed    aspirin 81 MG tablet   ONE DAILY                                calcium + D 600-200 MG-UNIT Tabs   1 TABLET DAILY   Generic drug:  calcium carbonate-vitamin D                                Chromium-Cinnamon 100-500 MCG-MG Caps   Take 1 tablet by mouth 2 times daily                                clobetasol 0.05 % cream   Commonly known as:  TEMOVATE   use at bedtime weekly at bedtime for maintenance                                guaiFENesin 600 MG 12 hr tablet   Commonly known as:  MUCINEX   Take 2 tablets by mouth 2 times daily as needed for congestion.                                levothyroxine 112 MCG tablet   Commonly known as:  SYNTHROID/LEVOTHROID   Take 1 tablet (112 mcg) by mouth daily                                MULTIVITAMIN PO   1 daily                                NAPROSYN 500 MG tablet   1 TABLET TWICE DAILY PRN   Generic drug:  naproxen                                PROSHIELD PLUS SKIN PROTECTANT 1 %   use as needed  up to QID   Generic drug:  dimethicone                                simvastatin 40 MG tablet   Commonly known as:   ZOCOR   Take 1 tablet (40 mg) by mouth At Bedtime

## 2017-05-17 LAB — COPATH REPORT: NORMAL

## 2017-06-14 ENCOUNTER — HOSPITAL ENCOUNTER (OUTPATIENT)
Dept: MAMMOGRAPHY | Facility: CLINIC | Age: 72
Discharge: HOME | End: 2017-06-14
Attending: INTERNAL MEDICINE | Admitting: INTERNAL MEDICINE
Payer: COMMERCIAL

## 2017-06-14 DIAGNOSIS — Z12.31 VISIT FOR SCREENING MAMMOGRAM: ICD-10-CM

## 2017-06-14 DIAGNOSIS — Z00.00 ROUTINE GENERAL MEDICAL EXAMINATION AT A HEALTH CARE FACILITY: ICD-10-CM

## 2017-06-14 PROCEDURE — G0202 SCR MAMMO BI INCL CAD: HCPCS

## 2017-07-19 DIAGNOSIS — E03.9 HYPOTHYROIDISM, UNSPECIFIED TYPE: ICD-10-CM

## 2017-07-19 PROCEDURE — 84439 ASSAY OF FREE THYROXINE: CPT | Performed by: INTERNAL MEDICINE

## 2017-07-19 PROCEDURE — 84443 ASSAY THYROID STIM HORMONE: CPT | Performed by: INTERNAL MEDICINE

## 2017-07-19 PROCEDURE — 36415 COLL VENOUS BLD VENIPUNCTURE: CPT | Performed by: INTERNAL MEDICINE

## 2017-07-20 LAB
T4 FREE SERPL-MCNC: 1.31 NG/DL (ref 0.76–1.46)
TSH SERPL DL<=0.005 MIU/L-ACNC: 0.34 MU/L (ref 0.4–4)

## 2017-07-31 ENCOUNTER — MYC REFILL (OUTPATIENT)
Dept: FAMILY MEDICINE | Facility: CLINIC | Age: 72
End: 2017-07-31

## 2017-07-31 DIAGNOSIS — E03.9 HYPOTHYROIDISM, UNSPECIFIED TYPE: ICD-10-CM

## 2017-07-31 NOTE — TELEPHONE ENCOUNTER
Synthroid       Last Written Prescription Date: 5/5/2017  Last Quantity: 90, # refills: 0  Last Office Visit with [facility] [place], [facility] or [facility] prescribing provider: 5/11/2017        TSH   Date Value Ref Range Status   07/19/2017 0.34 (L) 0.40 - 4.00 mU/L Final     Routing refill request to provider for review/approval because:  Labs out of range:  TSH.   Please advise if current dose is appropriate.     Latisha ROBIN RN, BSN, PHN  [facility] [place]

## 2017-07-31 NOTE — TELEPHONE ENCOUNTER
Message from [employer]:  Original authorizing provider: MD Abby Velázquez would like a refill of the following medications:  levothyroxine (SYNTHROID/LEVOTHROID) 112 MCG tablet [Gela Bueno MD]    Preferred pharmacy: [id number] [address] [place]    Comment:

## 2017-08-03 DIAGNOSIS — E03.9 HYPOTHYROIDISM, UNSPECIFIED TYPE: ICD-10-CM

## 2017-08-03 RX ORDER — LEVOTHYROXINE SODIUM 112 UG/1
112 TABLET ORAL DAILY
Qty: 90 TABLET | Refills: 2 | Status: SHIPPED | OUTPATIENT
Start: 2017-08-03 | End: 2018-04-30

## 2017-08-03 NOTE — TELEPHONE ENCOUNTER
levothyroxine (SYNTHROID/LEVOTHROID) 112 MCG     Last Written Prescription Date: 5/5/17  Last Quantity: 90, # refills: 0  Last Office Visit with [facility], [facility] or [facility] prescribing provider: 5/11/17        TSH   Date Value Ref Range Status   07/19/2017 0.34 (L) 0.40 - 4.00 mU/L Final

## 2017-08-03 NOTE — TELEPHONE ENCOUNTER
Chart reviewed. Last addressed 4/26/2017  Lab Results   Component Value Date    TSH 0.34 07/19/2017    TSH 0.14 04/26/2017     Lab Results   Component Value Date    T4 1.31 07/19/2017    T4 1.47 04/26/2017     Rx renewed

## 2017-08-07 RX ORDER — LEVOTHYROXINE SODIUM 112 UG/1
TABLET ORAL
Qty: 90 TABLET | Refills: 0 | OUTPATIENT
Start: 2017-08-07

## 2017-08-07 NOTE — TELEPHONE ENCOUNTER
This was sent 8/3/17-new dose due to low TSH.  Labs are ordered. Repeat labs 6 weeks.     Aurora Gaytan, [place]  Triage Nurse

## 2017-08-19 ENCOUNTER — MYC REFILL (OUTPATIENT)
Dept: FAMILY MEDICINE | Facility: CLINIC | Age: 72
End: 2017-08-19

## 2017-08-19 DIAGNOSIS — N90.4 LICHEN SCLEROSUS ET ATROPHICUS OF THE VULVA: ICD-10-CM

## 2017-08-21 RX ORDER — CLOBETASOL PROPIONATE 0.5 MG/G
CREAM TOPICAL
Qty: 15 G | Refills: 1 | Status: SHIPPED | OUTPATIENT
Start: 2017-08-21 | End: 2018-04-12

## 2017-08-21 NOTE — TELEPHONE ENCOUNTER
Temovate 0.05%      Last Written Prescription Date: 8/22/16  Last Fill Quantity: 15 g,  # refills: 1   [place] [facility] [place] [facility] or [facility] prescribing provider: 5/11/17    Prescription approved per [facility] Refill Protocol.  Aurora Gaytan, RN  Triage Nurse

## 2017-08-21 NOTE — TELEPHONE ENCOUNTER
Message from [employer]:  Original authorizing provider: MD Abby Velázquez would like a refill of the following medications:  clobetasol (TEMOVATE) 0.05 % cream [Gela Bueno MD]    Preferred pharmacy: [id number] [place], [place] - [address] [place] [address] [place]    Comment:

## 2017-09-25 ENCOUNTER — MYC MEDICAL ADVICE (OUTPATIENT)
Dept: FAMILY MEDICINE | Facility: CLINIC | Age: 72
End: 2017-09-25

## 2017-09-25 DIAGNOSIS — R73.09 ELEVATED GLUCOSE: Primary | ICD-10-CM

## 2017-09-25 NOTE — TELEPHONE ENCOUNTER
Please place A1c lab orders per last A1c completed in April.     Latisha ROBIN, RN, BSN, PHN  [facility] RN

## 2017-10-19 DIAGNOSIS — R73.09 ELEVATED GLUCOSE: ICD-10-CM

## 2017-10-19 DIAGNOSIS — E03.9 HYPOTHYROIDISM, UNSPECIFIED TYPE: ICD-10-CM

## 2017-10-19 LAB
HBA1C MFR BLD: 5.6 % (ref 4.3–6)
T4 FREE SERPL-MCNC: 1.27 NG/DL (ref 0.76–1.46)
TSH SERPL DL<=0.005 MIU/L-ACNC: 0.42 MU/L (ref 0.4–4)

## 2017-10-19 PROCEDURE — 84439 ASSAY OF FREE THYROXINE: CPT | Performed by: INTERNAL MEDICINE

## 2017-10-19 PROCEDURE — 36415 COLL VENOUS BLD VENIPUNCTURE: CPT | Performed by: INTERNAL MEDICINE

## 2017-10-19 PROCEDURE — 83036 HEMOGLOBIN GLYCOSYLATED A1C: CPT | Performed by: INTERNAL MEDICINE

## 2017-10-19 PROCEDURE — 84443 ASSAY THYROID STIM HORMONE: CPT | Performed by: INTERNAL MEDICINE

## 2018-04-09 ENCOUNTER — DOCUMENTATION ONLY (OUTPATIENT)
Dept: LAB | Facility: CLINIC | Age: 73
End: 2018-04-09

## 2018-04-09 DIAGNOSIS — E03.9 HYPOTHYROIDISM: ICD-10-CM

## 2018-04-09 DIAGNOSIS — R79.9 ABNORMAL BLOOD CHEMISTRY: Primary | ICD-10-CM

## 2018-04-09 DIAGNOSIS — E78.5 HYPERLIPIDEMIA LDL GOAL <130: ICD-10-CM

## 2018-04-12 ENCOUNTER — TELEPHONE (OUTPATIENT)
Dept: OBGYN | Facility: CLINIC | Age: 73
End: 2018-04-12

## 2018-04-12 ENCOUNTER — OFFICE VISIT (OUTPATIENT)
Dept: OBGYN | Facility: CLINIC | Age: 73
End: 2018-04-12
Payer: COMMERCIAL

## 2018-04-12 VITALS
HEART RATE: 84 BPM | SYSTOLIC BLOOD PRESSURE: 138 MMHG | BODY MASS INDEX: 31.17 KG/M2 | WEIGHT: 165.1 LBS | DIASTOLIC BLOOD PRESSURE: 80 MMHG | HEIGHT: 61 IN

## 2018-04-12 DIAGNOSIS — N90.4 LICHEN SCLEROSUS ET ATROPHICUS OF THE VULVA: ICD-10-CM

## 2018-04-12 DIAGNOSIS — Z01.411 ENCOUNTER FOR GYNECOLOGICAL EXAMINATION WITH ABNORMAL FINDING: ICD-10-CM

## 2018-04-12 DIAGNOSIS — L90.0 LICHEN SCLEROSUS: Primary | ICD-10-CM

## 2018-04-12 PROCEDURE — 99214 OFFICE O/P EST MOD 30 MIN: CPT | Performed by: OBSTETRICS & GYNECOLOGY

## 2018-04-12 RX ORDER — CLOBETASOL PROPIONATE 0.5 MG/G
CREAM TOPICAL
Qty: 15 G | Refills: 1 | Status: SHIPPED | OUTPATIENT
Start: 2018-04-12 | End: 2018-04-30

## 2018-04-12 NOTE — TELEPHONE ENCOUNTER
Pharmacy calling:  [facility] [phone]  They would like clarification of RX sent this am.  Lichen sclerosis: increase clobetasol to twice weekly for ongoing fusion and erythema. Can decrease again to weekly after 2 months. If she develops any skin thickening would recommend biopsy at that time.     Recommendation reviewed with pharmacy.  Jannette Hui RN

## 2018-04-12 NOTE — MR AVS SNAPSHOT
After Visit Summary   4/12/2018    Abby Casey    MRN: [id number]           Patient Information     Date Of Birth          1945        Visit Information        Provider Department      4/12/2018 8:30 AM Negra Olivia MD [facility]        Today's Diagnoses     Lichen sclerosus    -  1    Encounter for gynecological examination with abnormal finding        Lichen sclerosus et atrophicus of the vulva           Follow-ups after your visit        Your next 10 appointments already scheduled     Apr 24, 2018  8:30 AM CDT   [facility] with  [facility]   [facility]    [id number] [id number] [phone]           Please do not eat 10-12 hours before your appointment if you are coming in fasting for labs on lipids, cholesterol, or glucose (sugar). This does not apply to pregnant women. Water, hot tea and black coffee (with nothing added) are okay. Do not drink other fluids, diet soda or chew gum.            Apr 30, 2018  8:30 AM CDT   [facility]   [facility]    [address] [id number] [phone]              Who to contact     If you have questions or need follow up information about today's clinic visit or your schedule please contact [facility] directly at [phone].  Normal or non-critical lab and imaging results will be communicated to you by MyChart, letter or phone within 4 business days after the clinic has received the results. If you do not hear from us within 7 days, please contact the clinic through MyChart or phone. If you have a critical or abnormal lab result, we will notify you by phone as soon as possible.  Submit refill requests through [email or web address] or call your pharmacy and they will forward the refill request to us. Please allow 3 business days for your refill to be  "completed.          Additional Information About Your Visit        [employer] Information     [facility] gives you secure access to your electronic health record. If you see a primary care provider, you can also send messages to your care team and make appointments. If you have questions, please call your primary care clinic.  If you do not have a primary care provider, please call [phone] and they will assist you.        Care EveryWhere ID     This is your Care EveryWhere ID. This could be used by other organizations to access your [facility] medical records  [id number]        Your Vitals Were     Pulse Height BMI (Body Mass Index)             84 5' 1\" (1.549 m) 31.2 kg/m2          Blood Pressure from Last 3 Encounters:   04/12/18 138/80   05/16/17 137/69   05/11/17 128/78    Weight from Last 3 Encounters:   04/12/18 165 lb 1.6 oz (74.9 kg)   05/16/17 163 lb 1.6 oz (74 kg)   05/11/17 166 lb (75.3 kg)              Today, you had the following     No orders found for display         Today's Medication Changes          These changes are accurate as of 4/12/18 10:05 AM.  If you have any questions, ask your nurse or doctor.               These medicines have changed or have updated prescriptions.        Dose/Directions    clobetasol 0.05 % cream   Commonly known as:  TEMOVATE   This may have changed:  additional instructions   Used for:  Lichen sclerosus et atrophicus of the vulva   Changed by:  Negra Olivia MD        use at bedtime weekly at bedtime for maintenance. Twice weekly for the next two months.   Quantity:  15 g   Refills:  1            Where to get your medicines      These medications were sent to [id number] [address] [address]     Phone:  [phone]     clobetasol 0.05 % cream                Primary Care Provider Office Phone # Fax #    Gela Bueno [phone] [address]  [facility] " [id number]        Equal Access to Services     [facility]: Hadii justyna ellis niko Bernardo, wapetersonda luqsivlestre, qayunta shimablaze akbaraidejan, waxhumberto jf mccartymadinaradha chinchilla. So [facility] [phone].    ATENCIÓN: Si habla español, tiene a cheney disposición servicios gratuitos de asistencia lingüística. Moniqueame al [phone].    We comply with applicable federal civil rights laws and [place] laws. We do not discriminate on the basis of race, color, national origin, age, disability, sex, sexual orientation, or gender identity.            Thank you!     Thank you for choosing [facility]  for your care. Our goal is always to provide you with excellent care. Hearing back from our patients is one way we can continue to improve our services. Please take a few minutes to complete the written survey that you may receive in the mail after your visit with us. Thank you!             Your Updated Medication List - Protect others around you: Learn how to safely use, store and throw away your medicines at www.disposemymeds.org.          This list is accurate as of 4/12/18 10:05 AM.  Always use your most recent med list.                   Brand Name Dispense Instructions for use Diagnosis    aspirin 81 MG tablet     100    ONE DAILY    Other and unspecified hyperlipidemia       calcium + D 600-200 MG-UNIT Tabs   Generic drug:  calcium carbonate-vitamin D      1 TABLET DAILY        Chromium-Cinnamon 100-500 MCG-MG Caps      Take 1 tablet by mouth 2 times daily        clobetasol 0.05 % cream    TEMOVATE    15 g    use at bedtime weekly at bedtime for maintenance. Twice weekly for the next two months.    Lichen sclerosus et atrophicus of the vulva       guaiFENesin 600 MG 12 hr tablet    MUCINEX    40 tablet    Take 2 tablets by mouth 2 times daily as needed for congestion.    URI (upper respiratory infection)       levothyroxine 112 MCG tablet    SYNTHROID/LEVOTHROID    90 tablet    Take 1 tablet (112 mcg) by mouth daily     Hypothyroidism, unspecified type       MULTIVITAMIN PO      1 daily        NAPROSYN 500 MG tablet   Generic drug:  naproxen      1 TABLET TWICE DAILY PRN    Myalgia and myositis, unspecified       PROSHIELD PLUS SKIN PROTECTANT 1 %   Generic drug:  dimethicone     1 tube    use as needed  up to QID    Vulvar dystrophy       simvastatin 40 MG tablet    ZOCOR    90 tablet    Take 1 tablet (40 mg) by mouth At Bedtime    Hyperlipidemia LDL goal <130

## 2018-04-12 NOTE — PROGRESS NOTES
SUBJECTIVE:                                                   Abby Casey is a 73 year old female who presents to clinic today for routine breast and pelvic exam.  for routine breast and pelvic exam.  Postmenopausal. No vaginal bleeding since endometrial polypectomy 1 year ago. Ongoing weekly clobetasol use for lichen sclerosis. No vulvar symptoms at this time.         ROS:  Const: no weight changes, fever, chills  HEENT: post-nasal drip  : no leaking of urine, no vaginal bleeding  GI: no constipation, new onset diarrhea today  Breast: no nipple discharge, skin changes, lumps    History of abnormal Pap smear:  Lab Results   Component Value Date    PAP NIL 04/23/2015    PAP NIL 04/21/2014    PAP NIL 04/18/2013         Patient Active Problem List   Diagnosis     Hypothyroidism     Abnormal blood chemistry     Disorder of bone and cartilage     Diverticulosis of large intestine     Lichen sclerosus et atrophicus of the vulva     HYPERLIPIDEMIA LDL GOAL <130     Elevated glucose     Advanced directives, counseling/discussion     Papanicolaou smear of cervix with low grade squamous intraepithelial lesion (LGSIL)     Acute pain of right shoulder     Right rotator cuff tear     Rupture of right proximal biceps tendon     Tendinopathy of right rotator cuff     Osteoarthritis of right acromioclavicular joint     Adjustment disorder with anxious mood     Obesity     Past Surgical History:   Procedure Laterality Date     C NONSPECIFIC PROCEDURE  10/99    Colposcopy--reactive atypia         COLONOSCOPY      2006 and 2016     COLONOSCOPY N/A 11/16/2016    Procedure: COLONOSCOPY;  Surgeon: Adriano Roberto MD, MD;  Location:  GI     DILATION AND CURETTAGE, OPERATIVE HYSTEROSCOPY WITH MORCELLATOR, COMBINED N/A 05/16/2017    endometrial polypectomy for PMB     DILATION AND CURETTAGE, OPERATIVE HYSTEROSCOPY WITH MORCELLATOR, COMBINED N/A 5/16/2017    Procedure: COMBINED DILATION AND CURETTAGE, OPERATIVE HYSTEROSCOPY WITH  "MORCELLATOR;   DILATION AND CURETTAGE, OPERATIVE HYSTEROSCOPY WITH MORCELLATOR;  Surgeon: Negra Olivia MD;  Location: [facility] OR     EYE SURGERY      Bilateral cataract extraction     HC REMOVAL GALLBLADDER      Ngozi       TUBAL LIGATION        Social History   Substance Use Topics     Smoking status: Never Smoker     Smokeless tobacco: Never Used     Alcohol use Yes      Comment: 3 drinks per month      Problem (# of Occurrences) Relation (Name,Age of Onset)    CANCER (1) Father: Liver Cancer, Eye  Cancer    DIABETES (3) Brother, Brother, Sister    HEART DISEASE (1) Father:  @77       Negative family history of: Colon Cancer              Current Outpatient Prescriptions on File Prior to Visit:  clobetasol (TEMOVATE) 0.05 % cream use at bedtime weekly at bedtime for maintenance   levothyroxine (SYNTHROID/LEVOTHROID) 112 MCG tablet Take 1 tablet (112 mcg) by mouth daily   simvastatin (ZOCOR) 40 MG tablet Take 1 tablet (40 mg) by mouth At Bedtime   Chromium-Cinnamon 100-500 MCG-MG CAPS Take 1 tablet by mouth 2 times daily   guaiFENesin (MUCINEX) 600 MG 12 hr tablet Take 2 tablets by mouth 2 times daily as needed for congestion.   NAPROSYN 500 MG OR TABS 1 TABLET TWICE DAILY PRN   PROSHIELD PLUS SKIN PROTECTANT 1 % EX CREA use as needed  up to QID   CALCIUM + D 600-200 MG-UNIT OR TABS 1 TABLET DAILY   ASPIRIN 81 MG OR TABS ONE DAILY   MULTIVITAMIN OR 1 daily     No current facility-administered medications on file prior to visit.   Allergies   Allergen Reactions     Neurontin [Gabapentin] Visual Disturbance     Amoxicillin Hives     Clindamycin Hcl Diarrhea       Problem list and histories reviewed and adjusted as indicated.     OBJECTIVE:   /80 (BP Location: Left arm, Patient Position: Chair, Cuff Size: Adult Regular)  Pulse 84  Ht 5' 1\" (1.549 m)  Wt 165 lb 1.6 oz (74.9 kg)  BMI 31.2 kg/m2  Const: Healthy appearing female, no acute distress, comfortable  Breast Exam: No visible masses or " suspicious skin changes.  No discrete or dominant masses to palpation.  No axillary lymphadenopathy.  Abd: soft, non-tender. No masses, organomegaly, no tenderness over the bladder  Skin: No suspicious lesions or rashes  Psychiatric: mentation appears normal and affect bright  : External genitalia with normal hair distribution, atrophic, erythema of the introitus and labia minora which are fused posteriorly to the labia majora. Erythema in a figure of 8 pattern surrounding introitus and anus.  No obvious excoriations, lesions. Mild urethral prolapse. Bartholins, skeins normal.   Bimanual: No CMT, small smooth anteverted uterus. No adnexal masses or tenderness appreciated.     ASSESSMENT/PLAN:                                                    Abby Casey is a 73 year old female  with satisfactory breast and pelvic exam and surveillance of lichen sclerosis.    PLAN:  1)  Pap smear no longer indicated due to age criteria per ASCCP guidelines  2)  Mammogram: due , plans to obtain through her PCP  3)  Lichen sclerosis: increase clobetasol to twice weekly for ongoing fusion and erythema. Can decrease again to weekly after 2 months. If she develops any skin thickening would recommend biopsy at that time.     We reviewed that she needs annual breast and pelvic exams in the setting of lichen sclerosis. These can occur with PCP or Gyn per patient preference.     Negra Olivia MD   Obstetrics and Gynecology

## 2018-04-12 NOTE — NURSING NOTE
"Chief Complaint   Patient presents with     Gyn Exam     here for repeat pap, due to having polypectomy and d&c surgery last year       Initial /80 (BP Location: Left arm, Patient Position: Chair, Cuff Size: Adult Regular)  Pulse 84  Ht 5' 1\" (1.549 m)  Wt 165 lb 1.6 oz (74.9 kg)  BMI 31.2 kg/m2 Estimated body mass index is 31.2 kg/(m^2) as calculated from the following:    Height as of this encounter: 5' 1\" (1.549 m).    Weight as of this encounter: 165 lb 1.6 oz (74.9 kg).  Medication Reconciliation: complete     Alex Hawkins CMA      "

## 2018-04-24 DIAGNOSIS — E03.9 HYPOTHYROIDISM: ICD-10-CM

## 2018-04-24 DIAGNOSIS — E78.5 HYPERLIPIDEMIA LDL GOAL <130: ICD-10-CM

## 2018-04-24 DIAGNOSIS — R79.9 ABNORMAL BLOOD CHEMISTRY: ICD-10-CM

## 2018-04-24 LAB
ALBUMIN SERPL-MCNC: 3.8 G/DL (ref 3.4–5)
ALP SERPL-CCNC: 96 U/L (ref 40–150)
ALT SERPL W P-5'-P-CCNC: 26 U/L (ref 0–50)
ANION GAP SERPL CALCULATED.3IONS-SCNC: 8 MMOL/L (ref 3–14)
AST SERPL W P-5'-P-CCNC: 21 U/L (ref 0–45)
BILIRUB SERPL-MCNC: 0.4 MG/DL (ref 0.2–1.3)
BUN SERPL-MCNC: 17 MG/DL (ref 7–30)
CALCIUM SERPL-MCNC: 9.2 MG/DL (ref 8.5–10.1)
CHLORIDE SERPL-SCNC: 108 MMOL/L (ref 94–109)
CHOLEST SERPL-MCNC: 151 MG/DL
CO2 SERPL-SCNC: 26 MMOL/L (ref 20–32)
CREAT SERPL-MCNC: 0.59 MG/DL (ref 0.52–1.04)
GFR SERPL CREATININE-BSD FRML MDRD: >90 ML/MIN/1.7M2
GLUCOSE SERPL-MCNC: 125 MG/DL (ref 70–99)
HBA1C MFR BLD: 5.9 % (ref 0–5.6)
HDLC SERPL-MCNC: 43 MG/DL
LDLC SERPL CALC-MCNC: 81 MG/DL
NONHDLC SERPL-MCNC: 108 MG/DL
POTASSIUM SERPL-SCNC: 4 MMOL/L (ref 3.4–5.3)
PROT SERPL-MCNC: 6.9 G/DL (ref 6.8–8.8)
SODIUM SERPL-SCNC: 142 MMOL/L (ref 133–144)
TRIGL SERPL-MCNC: 133 MG/DL
TSH SERPL DL<=0.005 MIU/L-ACNC: 0.67 MU/L (ref 0.4–4)

## 2018-04-24 PROCEDURE — 36415 COLL VENOUS BLD VENIPUNCTURE: CPT | Performed by: INTERNAL MEDICINE

## 2018-04-24 PROCEDURE — 80061 LIPID PANEL: CPT | Performed by: INTERNAL MEDICINE

## 2018-04-24 PROCEDURE — 80053 COMPREHEN METABOLIC PANEL: CPT | Performed by: INTERNAL MEDICINE

## 2018-04-24 PROCEDURE — 84443 ASSAY THYROID STIM HORMONE: CPT | Performed by: INTERNAL MEDICINE

## 2018-04-24 PROCEDURE — 83036 HEMOGLOBIN GLYCOSYLATED A1C: CPT | Performed by: INTERNAL MEDICINE

## 2018-04-30 ENCOUNTER — OFFICE VISIT (OUTPATIENT)
Dept: FAMILY MEDICINE | Facility: CLINIC | Age: 73
End: 2018-04-30
Payer: COMMERCIAL

## 2018-04-30 VITALS
TEMPERATURE: 97.7 F | HEART RATE: 74 BPM | OXYGEN SATURATION: 98 % | RESPIRATION RATE: 16 BRPM | SYSTOLIC BLOOD PRESSURE: 126 MMHG | DIASTOLIC BLOOD PRESSURE: 70 MMHG | BODY MASS INDEX: 31.28 KG/M2 | WEIGHT: 165.7 LBS | HEIGHT: 61 IN

## 2018-04-30 DIAGNOSIS — E03.9 HYPOTHYROIDISM, UNSPECIFIED TYPE: ICD-10-CM

## 2018-04-30 DIAGNOSIS — M89.9 DISORDER OF BONE AND CARTILAGE: ICD-10-CM

## 2018-04-30 DIAGNOSIS — Z12.31 VISIT FOR SCREENING MAMMOGRAM: ICD-10-CM

## 2018-04-30 DIAGNOSIS — R73.09 ELEVATED GLUCOSE: ICD-10-CM

## 2018-04-30 DIAGNOSIS — Z00.00 ROUTINE HISTORY AND PHYSICAL EXAMINATION OF ADULT: Primary | ICD-10-CM

## 2018-04-30 DIAGNOSIS — E78.5 HYPERLIPIDEMIA LDL GOAL <130: ICD-10-CM

## 2018-04-30 DIAGNOSIS — M94.9 DISORDER OF BONE AND CARTILAGE: ICD-10-CM

## 2018-04-30 DIAGNOSIS — N90.4 LICHEN SCLEROSUS ET ATROPHICUS OF THE VULVA: ICD-10-CM

## 2018-04-30 PROCEDURE — 99397 PER PM REEVAL EST PAT 65+ YR: CPT | Performed by: INTERNAL MEDICINE

## 2018-04-30 PROCEDURE — 99213 OFFICE O/P EST LOW 20 MIN: CPT | Mod: 25 | Performed by: INTERNAL MEDICINE

## 2018-04-30 RX ORDER — CLOBETASOL PROPIONATE 0.5 MG/G
CREAM TOPICAL
Qty: 15 G | Refills: 1 | Status: SHIPPED | OUTPATIENT
Start: 2018-04-30 | End: 2019-10-28

## 2018-04-30 RX ORDER — LEVOTHYROXINE SODIUM 112 UG/1
112 TABLET ORAL DAILY
Qty: 90 TABLET | Refills: 3 | Status: SHIPPED | OUTPATIENT
Start: 2018-04-30 | End: 2019-05-02

## 2018-04-30 RX ORDER — SIMVASTATIN 40 MG
40 TABLET ORAL AT BEDTIME
Qty: 90 TABLET | Refills: 3 | Status: SHIPPED | OUTPATIENT
Start: 2018-04-30 | End: 2019-05-02

## 2018-04-30 ASSESSMENT — ENCOUNTER SYMPTOMS
CHILLS: 0
COUGH: 0
HEMATOCHEZIA: 0
DIARRHEA: 0
ABDOMINAL PAIN: 0
CONSTIPATION: 0
DIZZINESS: 0
EYE PAIN: 0
HEMATURIA: 0

## 2018-04-30 ASSESSMENT — ACTIVITIES OF DAILY LIVING (ADL)
CURRENT_FUNCTION: NO ASSISTANCE NEEDED
I_NEED_ASSISTANCE_FOR_THE_FOLLOWING_DAILY_ACTIVITIES:: NO ASSISTANCE IS NEEDED

## 2018-04-30 NOTE — PROGRESS NOTES
SUBJECTIVE:   Abby Casey is a 73 year old female who presents for Preventive Visit.      Are you in the first 12 months of your Medicare coverage?  No    Physical   Annual:     Getting at least 3 servings of Calcium per day::  Yes    Bi-annual eye exam::  Yes    Dental care twice a year::  Yes    Sleep apnea or symptoms of sleep apnea::  None    Diet::  Low fat/cholesterol    Frequency of exercise::  2-3 days/week    Duration of exercise::  15-30 minutes    Taking medications regularly::  Yes    Medication side effects::  Not applicable    Additional concerns today::  No    Ability to successfully perform activities of daily living: no assistance needed  Home Safety:  No safety concerns identified  Hearing Impairment: no hearing concerns        Fall risk:  Fallen 2 or more times in the past year?: No  Any fall with injury in the past year?: No    COGNITIVE SCREEN  1) Repeat 3 items (Banana, Sunrise, Chair)    2) Clock draw: NORMAL  3) 3 item recall: Recalls 3 objects  Results: 3 items recalled: COGNITIVE IMPAIRMENT LESS LIKELY    Mini-CogTM Copyright MARCOS Urrutia. Licensed by the author for use in [facility]; reprinted with permission (soob@[facility]). All rights reserved.        Reviewed and updated as needed this visit by clinical staff  Tobacco  Allergies  Meds  Problems  Med Hx  Surg Hx  Fam Hx  Soc Hx          Reviewed and updated as needed this visit by Provider  Allergies  Meds  Problems        Social History   Substance Use Topics     Smoking status: Never Smoker     Smokeless tobacco: Never Used     Alcohol use Yes      Comment: 3 drinks per month       Alcohol Use 4/30/2018   If you drink alcohol do you typically have greater than 3 drinks per day OR greater than 7 drinks per week? No         Hyperlipidemia Follow-Up      Rate your low fat/cholesterol diet?: good    Taking statin?  Yes, no muscle aches from statin    Other lipid medications/supplements?:  None    Lab Results    Component Value Date    LDL 81 04/24/2018    LDL 92 04/17/2017            Hypothyroidism Follow-up      Since last visit, patient describes the following symptoms: Weight stable, no hair loss, no skin changes, no constipation, no loose stools      Today's PHQ-2 Score:   PHQ-2 ( 1999 [employer]) 4/30/2018   Q1: Little interest or pleasure in doing things 0   Q2: Feeling down, depressed or hopeless 0   PHQ-2 Score 0   Q1: Little interest or pleasure in doing things Not at all   Q2: Feeling down, depressed or hopeless Not at all   PHQ-2 Score 0       Do you feel safe in your environment - Yes    Do you have a Health Care Directive?: Yes: Advance Directive has been received and scanned.    Current providers sharing in care for this patient include:   Patient Care Team:  Gela Bueno MD as PCP - General    The following health maintenance items are reviewed in Epic and correct as of today:  Health Maintenance   Topic Date Due     FALL RISK ASSESSMENT  04/26/2018     MAMMO Q1 YR  06/14/2018     ADVANCE DIRECTIVE PLANNING Q5 YRS  04/21/2019     TSH Q1 YEAR  04/24/2019     LIPID MONITORING Q1 YEAR  04/24/2019     TETANUS Q10 YR  09/15/2020     COLONOSCOPY Q10 YR  11/16/2026     DEXA SCAN SCREENING (SYSTEM ASSIGNED)  Completed     PNEUMOCOCCAL  Completed     INFLUENZA VACCINE  Completed     HEPATITIS C SCREENING  Completed     Labs reviewed in EPIC  BP Readings from Last 3 Encounters:   04/30/18 126/70   04/12/18 138/80   05/16/17 137/69    Wt Readings from Last 3 Encounters:   04/30/18 165 lb 11.2 oz (75.2 kg)   04/12/18 165 lb 1.6 oz (74.9 kg)   05/16/17 163 lb 1.6 oz (74 kg)                  Patient Active Problem List   Diagnosis     Hypothyroidism     Abnormal blood chemistry     Disorder of bone and cartilage     Diverticulosis of large intestine     Lichen sclerosus et atrophicus of the vulva     HYPERLIPIDEMIA LDL GOAL <130     Elevated glucose     Advanced directives, counseling/discussion     Papanicolaou  smear of cervix with low grade squamous intraepithelial lesion (LGSIL)     Acute pain of right shoulder     Right rotator cuff tear     Rupture of right proximal biceps tendon     Tendinopathy of right rotator cuff     Osteoarthritis of right acromioclavicular joint     Adjustment disorder with anxious mood     Obesity     Past Surgical History:   Procedure Laterality Date     C NONSPECIFIC PROCEDURE  10/99    Colposcopy--reactive atypia         COLONOSCOPY       and      COLONOSCOPY N/A 2016    Procedure: COLONOSCOPY;  Surgeon: Adriano Roberto MD, MD;  Location: [facility]     DILATION AND CURETTAGE, OPERATIVE HYSTEROSCOPY WITH MORCELLATOR, COMBINED N/A 2017    endometrial polypectomy for PMB     DILATION AND CURETTAGE, OPERATIVE HYSTEROSCOPY WITH MORCELLATOR, COMBINED N/A 2017    Procedure: COMBINED DILATION AND CURETTAGE, OPERATIVE HYSTEROSCOPY WITH MORCELLATOR;   DILATION AND CURETTAGE, OPERATIVE HYSTEROSCOPY WITH MORCELLATOR;  Surgeon: Negra Olivia MD;  Location: [facility] OR     EYE SURGERY      Bilateral cataract extraction     HC REMOVAL GALLBLADDER      Ngozi       TUBAL LIGATION         Social History   Substance Use Topics     Smoking status: Never Smoker     Smokeless tobacco: Never Used     Alcohol use Yes      Comment: 3 drinks per month     Family History   Problem Relation Age of Onset     HEART DISEASE Father       @77     CANCER Father      Liver Cancer, Eye  Cancer     DIABETES Brother      DIABETES Brother      DIABETES Sister      Colon Cancer No family hx of          Current Outpatient Prescriptions   Medication Sig Dispense Refill     clobetasol (TEMOVATE) 0.05 % cream use at bedtime weekly at bedtime for maintenance. Twice weekly for the next two months. 15 g 1     levothyroxine (SYNTHROID/LEVOTHROID) 112 MCG tablet Take 1 tablet (112 mcg) by mouth daily 90 tablet 3     simvastatin (ZOCOR) 40 MG tablet Take 1 tablet (40 mg) by mouth At Bedtime 90 tablet 3      ASPIRIN 81 MG OR TABS ONE DAILY 100 3     CALCIUM + D 600-200 MG-UNIT OR TABS 1 TABLET DAILY       Chromium-Cinnamon 100-500 MCG-MG CAPS Take 1 tablet by mouth 2 times daily       guaiFENesin (MUCINEX) 600 MG 12 hr tablet Take 2 tablets by mouth 2 times daily as needed for congestion. 40 tablet 0     MULTIVITAMIN OR 1 daily       NAPROSYN 500 MG OR TABS 1 TABLET TWICE DAILY PRN  1     PROSHIELD PLUS SKIN PROTECTANT 1 % EX CREA use as needed  up to QID 1 tube 1     [DISCONTINUED] levothyroxine (SYNTHROID/LEVOTHROID) 112 MCG tablet Take 1 tablet (112 mcg) by mouth daily 90 tablet 2     [DISCONTINUED] simvastatin (ZOCOR) 40 MG tablet Take 1 tablet (40 mg) by mouth At Bedtime 90 tablet 3     Allergies   Allergen Reactions     Neurontin [Gabapentin] Visual Disturbance     Amoxicillin Hives     Clindamycin Hcl Diarrhea       Mammogram Screening: Patient over age 50, mutual decision to screen reflected in health maintenance.    Review of Systems   Constitutional: Negative for chills.   HENT: Negative for congestion and ear pain.    Eyes: Negative for pain.   Respiratory: Negative for cough.    Cardiovascular: Negative for chest pain.   Gastrointestinal: Negative for abdominal pain, constipation, diarrhea and hematochezia.   Endocrine:        Simvastatin well tolerated; LDL Cholesterol Calculated       Date                     Value               Ref Range           Status                04/24/2018               81                  <100 mg/dL          Final              Comment:    Desirable:       <100 mg/dl  ----------)    Lab Results       Component                Value               Date                       TSH                      0.67                04/24/2018            Lab Results       Component                Value               Date                       T4                       1.27                10/19/2017            Well tolerated and effective at lower dose, Levothyroxine 112 mcg   Genitourinary: Negative  "for hematuria.   Neurological: Negative for dizziness.         OBJECTIVE:   /70  Pulse 74  Temp 97.7  F (36.5  C) (Oral)  Resp 16  Ht 5' 1\" (1.549 m)  Wt 165 lb 11.2 oz (75.2 kg)  SpO2 98%  BMI 31.31 kg/m2 Estimated body mass index is 31.31 kg/(m^2) as calculated from the following:    Height as of this encounter: 5' 1\" (1.549 m).    Weight as of this encounter: 165 lb 11.2 oz (75.2 kg).  Physical Exam  GENERAL: healthy, alert and no distress  EYES: Eyes grossly normal to inspection, PERRL and conjunctivae and sclerae normal  HENT: ear canals and TM's normal, nose and mouth without ulcers or lesions  NECK: no adenopathy, no asymmetry, masses, or scars and thyroid normal to palpation  RESP: lungs clear to auscultation - no rales, rhonchi or wheezes  CV: regular rates and rhythm, normal S1 S2, no S3 or S4, peripheral pulses strong and no peripheral edema  ABDOMEN: soft, nontender, no hepatosplenomegaly, no masses and bowel sounds normal  MS: no gross musculoskeletal defects noted, no edema  SKIN: no suspicious lesions or rashes  NEURO: Normal strength and tone, mentation intact and speech normal  PSYCH: mentation appears normal, affect normal/bright    ASSESSMENT / PLAN:   (Z00.00) Routine history and physical examination of adult  (primary encounter diagnosis)  Comment: HEALTH CARE MAINTENANCE reviewed;  Plan:keep active; exercise encouraged          (E03.9) Hypothyroidism, unspecified type  Comment: Doing well on lower dose; well tolerated; labs reviewed; therapeutic range.  Plan: levothyroxine (SYNTHROID/LEVOTHROID) 112 MCG         tablet          (E78.5) Hyperlipidemia LDL goal <130  Comment: Pravastatin dose and labs reviewed; well tolerated.  Plan: simvastatin (ZOCOR) 40 MG tablet          (Z12.31) Visit for screening mammogram  Comment: Clinical Breast Exam done by GYN  Plan: MA Screening Digital Bilateral          (N90.4) Lichen sclerosus et atrophicus of the vulva  Comment: has pelvic exam done " "previously with GYN; pt requesting PCP do vulvar exam and renew meds as of 4/2019; if increased symptoms or change in exam , will then defer to GYN  Plan: clobetasol (TEMOVATE) 0.05 % cream- per GYN          (R73.09) Elevated glucose  Comment:   Lab Results   Component Value Date     04/24/2018     04/17/2017   A1C OK  Plan: healthy diet and regular exercise    (M89.9,  M94.9) Disorder of bone and cartilage  Comment: Reviewed DEXA from 2014  Plan: healthy diet; dietary calcium, Vit D and regular exercise.    End of Life Planning:  Patient currently has an advanced directive: offered    COUNSELING:  Reviewed preventive health counseling, as reflected in patient instructions       Regular exercise       Healthy diet/nutrition    BP Screening:   Last 3 BP Readings:    BP Readings from Last 3 Encounters:   04/30/18 126/70   04/12/18 138/80   05/16/17 137/69       The following was recommended to the patient:  Re-screen BP within a year and recommended lifestyle modifications    Estimated body mass index is 31.31 kg/(m^2) as calculated from the following:    Height as of this encounter: 5' 1\" (1.549 m).    Weight as of this encounter: 165 lb 11.2 oz (75.2 kg).  Weight management plan: Discussed healthy diet and exercise guidelines and patient will follow up in 6 months in clinic to re-evaluate.   reports that she has never smoked. She has never used smokeless tobacco.      Appropriate preventive services were discussed with this patient, including applicable screening as appropriate for cardiovascular disease, diabetes, osteopenia/osteoporosis, and glaucoma.  As appropriate for age/gender, discussed screening for colorectal cancer, prostate cancer, breast cancer, and cervical cancer. Checklist reviewing preventive services available has been given to the patient.    Reviewed patients plan of care and provided an AVS. The Care Plan for Abby meets the Care Plan requirement. This Care Plan has been " established and reviewed with the Patient.    Counseling Resources:  ATP IV Guidelines  Pooled Cohorts Equation Calculator  Breast Cancer Risk Calculator  FRAX Risk Assessment  ICSI Preventive Guidelines  Dietary Guidelines for Americans, 2010  USDA's MyPlate  ASA Prophylaxis  Lung CA Screening    Gela Bueno MD  Internal Medicine  [facility]  15 minutes in addition to HEALTH CARE MAINTENANCE are spent with patient, over 50% of that time spent providing counselling, discussing and reviewing medical conditions/concerns, meds and potential side effects.    Answers for HPI/ROS submitted by the patient on 4/30/2018   PHQ-2 Score: 0

## 2018-04-30 NOTE — MR AVS SNAPSHOT
After Visit Summary   4/30/2018    Abby Casey    MRN: [id number]           Patient Information     Date Of Birth          1945        Visit Information        Provider Department      4/30/2018 8:30 AM Gela Bueno MD [facility]3]unt        Today's Diagnoses     Routine history and physical examination of adult    -  1    Hypothyroidism, unspecified type        Hyperlipidemia LDL goal <130        Visit for screening mammogram        Lichen sclerosus et atrophicus of the vulva        Elevated glucose        Disorder of bone and cartilage          Care Instructions      Preventive Health Recommendations    Female Ages 65 +    Yearly exam:     See your health care provider every year in order to  o Review health changes.   o Discuss preventive care.    o Review your medicines if your doctor has prescribed any.      You no longer need a yearly Pap test unless you've had an abnormal Pap test in the past 10 years. If you have vaginal symptoms, such as bleeding or discharge, be sure to talk with your provider about a Pap test.      Every 1 to 2 years, have a mammogram.  If you are over 69, talk with your health care provider about whether or not you want to continue having screening mammograms.      Every 10 years, have a colonoscopy. Or, have a yearly FIT test (stool test). These exams will check for colon cancer.       Have a cholesterol test every 5 years, or more often if your doctor advises it.       Have a diabetes test (fasting glucose) every three years. If you are at risk for diabetes, you should have this test more often.       At age 65, have a bone density scan (DEXA) to check for osteoporosis (brittle bone disease).    Shots:    Get a flu shot each year.    Get a tetanus shot every 10 years.    Talk to your doctor about your pneumonia vaccines. There are now two you should receive - Pneumovax (PPSV 23) and Prevnar (PCV 13).    Talk to your doctor about the shingles  vaccine.    Talk to your doctor about the hepatitis B vaccine.    Nutrition:     Eat at least 5 servings of fruits and vegetables each day.      Eat whole-grain bread, whole-wheat pasta and brown rice instead of white grains and rice.      Talk to your provider about Calcium and Vitamin D.     Lifestyle    Exercise at least 150 minutes a week (30 minutes a day, 5 days a week). This will help you control your weight and prevent disease.      Limit alcohol to one drink per day.      No smoking.       Wear sunscreen to prevent skin cancer.       See your dentist twice a year for an exam and cleaning.      See your eye doctor every 1 to 2 years to screen for conditions such as glaucoma, macular degeneration and cataracts.          Follow-ups after your visit        Follow-up notes from your care team     Return in about 1 year (around 4/30/2019) for Physical Exam, cholesterol, thyroid.      Future tests that were ordered for you today     Open Future Orders        Priority Expected Expires Ordered    MA Screening Digital Bilateral Routine  4/30/2019 4/30/2018            Who to contact     If you have questions or need follow up information about today's clinic visit or your schedule please contact [facility] directly at [phone].  Normal or non-critical lab and imaging results will be communicated to you by MyChart, letter or phone within 4 business days after the clinic has received the results. If you do not hear from us within 7 days, please contact the clinic through [employer] or phone. If you have a critical or abnormal lab result, we will notify you by phone as soon as possible.  Submit refill requests through [facility] or call your pharmacy and they will forward the refill request to us. Please allow 3 business days for your refill to be completed.          Additional Information About Your Visit        [employer] Information     [facility] gives you secure access to your electronic health record. If  "you see a primary care provider, you can also send messages to your care team and make appointments. If you have questions, please call your primary care clinic.  If you do not have a primary care provider, please call [phone] and they will assist you.        Care EveryWhere ID     This is your Care EveryWhere ID. This could be used by other organizations to access your [place] medical records  [id number]        Your Vitals Were     Pulse Temperature Respirations Height Pulse Oximetry BMI (Body Mass Index)    74 97.7  F (36.5  C) (Oral) 16 5' 1\" (1.549 m) 98% 31.31 kg/m2       Blood Pressure from Last 3 Encounters:   04/30/18 126/70   04/12/18 138/80   05/16/17 137/69    Weight from Last 3 Encounters:   04/30/18 165 lb 11.2 oz (75.2 kg)   04/12/18 165 lb 1.6 oz (74.9 kg)   05/16/17 163 lb 1.6 oz (74 kg)                 Where to get your medicines      These medications were sent to [id number] [address] [address]     Phone:  [phone]     clobetasol 0.05 % cream    levothyroxine 112 MCG tablet    simvastatin 40 MG tablet          Primary Care Provider Office Phone # Fax #    Gela Bueno [phone]       [address] [facility] [address]        Equal Access to Services     LANE HEARD AH: Hadii justyna ku hadasho Soomaali, waaxda luqadaha, qaybta kaalmada akbaregdejan, cely chinchilla. So [facility] [phone].    ATENCIÓN: Si habla español, tiene a cheney disposición servicios gratuitos de asistencia lingüística. Llame al [phone].    We comply with applicable federal civil rights laws and [place] laws. We do not discriminate on the basis of race, color, national origin, age, disability, sex, sexual orientation, or gender identity.            Thank you!     Thank you for choosing [facility]  for your care. Our goal is always to provide you with excellent care. " Hearing back from our patients is one way we can continue to improve our services. Please take a few minutes to complete the written survey that you may receive in the mail after your visit with us. Thank you!             Your Updated Medication List - Protect others around you: Learn how to safely use, store and throw away your medicines at www.disposemymeds.org.          This list is accurate as of 4/30/18  9:10 AM.  Always use your most recent med list.                   Brand Name Dispense Instructions for use Diagnosis    aspirin 81 MG tablet     100    ONE DAILY    Other and unspecified hyperlipidemia       calcium + D 600-200 MG-UNIT Tabs   Generic drug:  calcium carbonate-vitamin D      1 TABLET DAILY        Chromium-Cinnamon 100-500 MCG-MG Caps      Take 1 tablet by mouth 2 times daily        clobetasol 0.05 % cream    TEMOVATE    15 g    use at bedtime weekly at bedtime for maintenance. Twice weekly for the next two months.    Lichen sclerosus et atrophicus of the vulva       guaiFENesin 600 MG 12 hr tablet    MUCINEX    40 tablet    Take 2 tablets by mouth 2 times daily as needed for congestion.    URI (upper respiratory infection)       levothyroxine 112 MCG tablet    SYNTHROID/LEVOTHROID    90 tablet    Take 1 tablet (112 mcg) by mouth daily    Hypothyroidism, unspecified type, Routine history and physical examination of adult       MULTIVITAMIN PO      1 daily        NAPROSYN 500 MG tablet   Generic drug:  naproxen      1 TABLET TWICE DAILY PRN    Myalgia and myositis, unspecified       PROSHIELD PLUS SKIN PROTECTANT 1 %   Generic drug:  dimethicone     1 tube    use as needed  up to QID    Vulvar dystrophy       simvastatin 40 MG tablet    ZOCOR    90 tablet    Take 1 tablet (40 mg) by mouth At Bedtime    Hyperlipidemia LDL goal <130

## 2018-05-22 ENCOUNTER — TRANSFERRED RECORDS (OUTPATIENT)
Dept: HEALTH INFORMATION MANAGEMENT | Facility: CLINIC | Age: 73
End: 2018-05-22

## 2018-06-18 ENCOUNTER — HOSPITAL ENCOUNTER (OUTPATIENT)
Dept: MAMMOGRAPHY | Facility: CLINIC | Age: 73
Discharge: HOME | End: 2018-06-18
Attending: INTERNAL MEDICINE | Admitting: INTERNAL MEDICINE
Payer: COMMERCIAL

## 2018-06-18 DIAGNOSIS — Z12.31 VISIT FOR SCREENING MAMMOGRAM: ICD-10-CM

## 2018-06-18 PROCEDURE — 77067 SCR MAMMO BI INCL CAD: CPT

## 2018-10-22 ENCOUNTER — OFFICE VISIT (OUTPATIENT)
Dept: FAMILY MEDICINE | Facility: CLINIC | Age: 73
End: 2018-10-22
Payer: COMMERCIAL

## 2018-10-22 VITALS
WEIGHT: 166.8 LBS | OXYGEN SATURATION: 97 % | TEMPERATURE: 98.1 F | SYSTOLIC BLOOD PRESSURE: 134 MMHG | DIASTOLIC BLOOD PRESSURE: 64 MMHG | BODY MASS INDEX: 31.52 KG/M2 | RESPIRATION RATE: 16 BRPM | HEART RATE: 74 BPM

## 2018-10-22 DIAGNOSIS — H61.22 IMPACTED CERUMEN OF LEFT EAR: ICD-10-CM

## 2018-10-22 DIAGNOSIS — H83.03 LABYRINTHITIS OF BOTH EARS: Primary | ICD-10-CM

## 2018-10-22 PROCEDURE — 99213 OFFICE O/P EST LOW 20 MIN: CPT | Performed by: INTERNAL MEDICINE

## 2018-10-22 RX ORDER — MECLIZINE HYDROCHLORIDE 25 MG/1
25 TABLET ORAL EVERY 6 HOURS PRN
Qty: 12 TABLET | Refills: 1 | Status: SHIPPED | OUTPATIENT
Start: 2018-10-22 | End: 2019-05-02

## 2018-10-22 NOTE — MR AVS SNAPSHOT
After Visit Summary   10/22/2018    Abby Casey    MRN: [id number]           Patient Information     Date Of Birth          1945        Visit Information        Provider Department      10/22/2018 11:00 AM Gela Bueno MD [facility]        Today's Diagnoses     Labyrinthitis of both ears    -  1    Impacted cerumen of left ear          Care Instructions    Left cerumen  Debrox recommended and home irrigation.     Allergy medications   Mucinex            Follow-ups after your visit        Who to contact     If you have questions or need follow up information about today's clinic visit or your schedule please contact [facility] directly at [phone].  Normal or non-critical lab and imaging results will be communicated to you by MyChart, letter or phone within 4 business days after the clinic has received the results. If you do not hear from us within 7 days, please contact the clinic through [employer] or phone. If you have a critical or abnormal lab result, we will notify you by phone as soon as possible.  Submit refill requests through [employer] or call your pharmacy and they will forward the refill request to us. Please allow 3 business days for your refill to be completed.          Additional Information About Your Visit        MyChart Information     [employer] gives you secure access to your electronic health record. If you see a primary care provider, you can also send messages to your care team and make appointments. If you have questions, please call your primary care clinic.  If you do not have a primary care provider, please call [phone] and they will assist you.        Care EveryWhere ID     This is your Care EveryWhere ID. This could be used by other organizations to access your [place] medical records  [id number]        Your Vitals Were     Pulse Temperature Respirations Pulse Oximetry BMI (Body Mass Index)       74 98.1  F (36.7  C)  (Oral) 16 97% 31.52 kg/m2        Blood Pressure from Last 3 Encounters:   10/22/18 134/64   04/30/18 126/70   04/12/18 138/80    Weight from Last 3 Encounters:   10/22/18 166 lb 12.8 oz (75.7 kg)   04/30/18 165 lb 11.2 oz (75.2 kg)   04/12/18 165 lb 1.6 oz (74.9 kg)              Today, you had the following     No orders found for display         Today's Medication Changes          These changes are accurate as of 10/22/18 12:27 PM.  If you have any questions, ask your nurse or doctor.               Start taking these medicines.        Dose/Directions    meclizine 25 MG tablet   Commonly known as:  ANTIVERT   Used for:  Labyrinthitis of both ears   Started by:  Gela Bueno MD        Dose:  25 mg   Take 1 tablet (25 mg) by mouth every 6 hours as needed for dizziness   Quantity:  12 tablet   Refills:  1            Where to get your medicines      Some of these will need a paper prescription and others can be bought over the counter.  Ask your nurse if you have questions.     Bring a paper prescription for each of these medications     meclizine 25 MG tablet                Primary Care Provider Office Phone # Fax #    Gela Bueno [phone] [address] [facility] [address]        Equal Access to Services     [facility] FÉLIX AH: Hadii aad ku hadasho Soomaali, waaxda luqadaha, qaybta kaalmada adeegyada, waxhumberto rhoades haytanvir chinchilla. So [facility] [phone].    ATENCIÓN: Si habla español, tiene a cheney disposición servicios gratuitos de asistencia lingüística. Llame al [phone].    We comply with applicable federal civil rights laws and [place] laws. We do not discriminate on the basis of race, color, national origin, age, disability, sex, sexual orientation, or gender identity.            Thank you!     Thank you for choosing [facility]  for your care. Our goal is always to provide you with excellent care. Hearing back from our patients is one way  we can continue to improve our services. Please take a few minutes to complete the written survey that you may receive in the mail after your visit with us. Thank you!             Your Updated Medication List - Protect others around you: Learn how to safely use, store and throw away your medicines at www.disposemymeds.org.          This list is accurate as of 10/22/18 12:27 PM.  Always use your most recent med list.                   Brand Name Dispense Instructions for use Diagnosis    aspirin 81 MG tablet     100    ONE DAILY    Other and unspecified hyperlipidemia       calcium + D 600-200 MG-UNIT Tabs   Generic drug:  calcium carbonate-vitamin D      1 TABLET DAILY        Chromium-Cinnamon 100-500 MCG-MG Caps      Take 1 tablet by mouth 2 times daily        clobetasol 0.05 % cream    TEMOVATE    15 g    use at bedtime weekly at bedtime for maintenance. Twice weekly for the next two months.    Lichen sclerosus et atrophicus of the vulva       guaiFENesin 600 MG 12 hr tablet    MUCINEX    40 tablet    Take 2 tablets by mouth 2 times daily as needed for congestion.    URI (upper respiratory infection)       levothyroxine 112 MCG tablet    SYNTHROID/LEVOTHROID    90 tablet    Take 1 tablet (112 mcg) by mouth daily    Hypothyroidism, unspecified type, Routine history and physical examination of adult       meclizine 25 MG tablet    ANTIVERT    12 tablet    Take 1 tablet (25 mg) by mouth every 6 hours as needed for dizziness    Labyrinthitis of both ears       MULTIVITAMIN PO      1 daily        NAPROSYN 500 MG tablet   Generic drug:  naproxen      1 TABLET TWICE DAILY PRN    Myalgia and myositis, unspecified       PROSHIELD PLUS SKIN PROTECTANT 1 %   Generic drug:  dimethicone     1 tube    use as needed  up to QID    Vulvar dystrophy       simvastatin 40 MG tablet    ZOCOR    90 tablet    Take 1 tablet (40 mg) by mouth At Bedtime    Hyperlipidemia LDL goal <130

## 2018-10-22 NOTE — PROGRESS NOTES
SUBJECTIVE:   Abby Casey is a 73 year old female who presents to clinic today for the following health issues:      Dizziness      Duration: about 7 days   Symptoms have happened at night and first thing in the morning and can last for several hours.     Description   Feeling faint:  no   Feeling like the surroundings are moving: YES  Loss of consciousness or falls: no     Intensity:  moderate    Accompanying signs and symptoms:   Nausea/vomitting: YES  Intermittent for the past few months (has had sinus drainage and congestion over the past several months)     Palpitations: no   Weakness in arms or legs: no   Vision or speech changes: no   Ringing in ears (Tinnitus): YES- notes some ringing at night when the house is quiet  Hearing loss related to dizziness: no   Other (fevers/chills/sweating/dyspnea): no     History (similar episodes/head trauma/previous evaluation/recent bleeding): None    Precipitating or alleviating factors (new meds/chemicals): None  Worse with activity/head movement: YES- when it starts in the morning it seems like it is worse    Feels like she tips to the left side.     Therapies tried and outcome: None      Problem list and histories reviewed & adjusted, as indicated.  Additional history: as documented    Patient Active Problem List   Diagnosis     Hypothyroidism     Abnormal blood chemistry     Disorder of bone and cartilage     Diverticulosis of large intestine     Lichen sclerosus et atrophicus of the vulva     HYPERLIPIDEMIA LDL GOAL <130     Elevated glucose     Advanced directives, counseling/discussion     Papanicolaou smear of cervix with low grade squamous intraepithelial lesion (LGSIL)     Acute pain of right shoulder     Right rotator cuff tear     Rupture of right proximal biceps tendon     Tendinopathy of right rotator cuff     Osteoarthritis of right acromioclavicular joint     Adjustment disorder with anxious mood     Obesity     Past Surgical History:   Procedure  Laterality Date     C NONSPECIFIC PROCEDURE  10/99    Colposcopy--reactive atypia         COLONOSCOPY       and      COLONOSCOPY N/A 2016    Procedure: COLONOSCOPY;  Surgeon: Adriano Roberto MD, MD;  Location: [facility]     DILATION AND CURETTAGE, OPERATIVE HYSTEROSCOPY WITH MORCELLATOR, COMBINED N/A 2017    endometrial polypectomy for PMB     DILATION AND CURETTAGE, OPERATIVE HYSTEROSCOPY WITH MORCELLATOR, COMBINED N/A 2017    Procedure: COMBINED DILATION AND CURETTAGE, OPERATIVE HYSTEROSCOPY WITH MORCELLATOR;   DILATION AND CURETTAGE, OPERATIVE HYSTEROSCOPY WITH MORCELLATOR;  Surgeon: Negra Olivia MD;  Location: [facility] OR     EYE SURGERY      Bilateral cataract extraction     HC REMOVAL GALLBLADDER      Ngozi       TUBAL LIGATION         Social History   Substance Use Topics     Smoking status: Never Smoker     Smokeless tobacco: Never Used     Alcohol use Yes      Comment: 3 drinks per month     Family History   Problem Relation Age of Onset     HEART DISEASE Father       @77     Cancer Father      Liver Cancer, Eye  Cancer     Diabetes Brother      Diabetes Brother      Diabetes Sister      Colon Cancer No family hx of          Current Outpatient Prescriptions   Medication Sig Dispense Refill     ASPIRIN 81 MG OR TABS ONE DAILY 100 3     CALCIUM + D 600-200 MG-UNIT OR TABS 1 TABLET DAILY       Chromium-Cinnamon 100-500 MCG-MG CAPS Take 1 tablet by mouth 2 times daily       clobetasol (TEMOVATE) 0.05 % cream use at bedtime weekly at bedtime for maintenance. Twice weekly for the next two months. 15 g 1     guaiFENesin (MUCINEX) 600 MG 12 hr tablet Take 2 tablets by mouth 2 times daily as needed for congestion. 40 tablet 0     levothyroxine (SYNTHROID/LEVOTHROID) 112 MCG tablet Take 1 tablet (112 mcg) by mouth daily 90 tablet 3     meclizine (ANTIVERT) 25 MG tablet Take 1 tablet (25 mg) by mouth every 6 hours as needed for dizziness 12 tablet 1     MULTIVITAMIN OR 1 daily        NAPROSYN 500 MG OR TABS 1 TABLET TWICE DAILY PRN  1     PROSHIELD PLUS SKIN PROTECTANT 1 % EX CREA use as needed  up to QID 1 tube 1     simvastatin (ZOCOR) 40 MG tablet Take 1 tablet (40 mg) by mouth At Bedtime 90 tablet 3     Allergies   Allergen Reactions     Neurontin [Gabapentin] Visual Disturbance     Amoxicillin Hives     Clindamycin Hcl Diarrhea     BP Readings from Last 3 Encounters:   10/22/18 134/64   04/30/18 126/70   04/12/18 138/80    Wt Readings from Last 3 Encounters:   10/22/18 166 lb 12.8 oz (75.7 kg)   04/30/18 165 lb 11.2 oz (75.2 kg)   04/12/18 165 lb 1.6 oz (74.9 kg)                  Labs reviewed in EPIC    Reviewed and updated as needed this visit by clinical staff  Tobacco  Allergies  Meds  Problems  Med Hx  Surg Hx  Fam Hx  Soc Hx        Reviewed and updated as needed this visit by Provider  Allergies  Meds  Problems         ROS:  CONSTITUTIONAL: NEGATIVE for fever, chills, change in weight  ENT/MOUTH: cerumen build up at times  RESP: NEGATIVE for significant cough or SOB  CV: NEGATIVE for chest pain, palpitations or peripheral edema  GI: NEGATIVE for nausea, abdominal pain, heartburn, or change in bowel habits  NEURO: dizziness has resolved. No headache or visual changes.  PSYCHIATRIC: NEGATIVE for changes in mood or affect    OBJECTIVE:     /64  Pulse 74  Temp 98.1  F (36.7  C) (Oral)  Resp 16  Wt 166 lb 12.8 oz (75.7 kg)  SpO2 97%  BMI 31.52 kg/m2  Body mass index is 31.52 kg/(m^2).  GENERAL: healthy, alert and no distress  HENT: ear canals and TM's normal, nose and mouth without ulcers or lesions  NECK: no adenopathy, no asymmetry, masses, or scars and thyroid normal to palpation  RESP: lungs clear to auscultation - no rales, rhonchi or wheezes  CV: regular rates and rhythm, normal S1 S2,  peripheral pulses strong and no peripheral edema  ABDOMEN: soft, nontender, no hepatosplenomegaly, no masses and bowel sounds normal  MS: no gross musculoskeletal defects noted,  no edema  NEURO: Normal strength and tone, sensory exam grossly normal and mentation intact; EOM Intact; no nystagmus  PSYCH: mentation appears normal, affect normal/bright      ASSESSMENT/PLAN:     (H83.03) Labyrinthitis of both ears  (primary encounter diagnosis)  Comment: inner ear; increased cerumen present; add antivert; medication can result in sleepiness and fatigue  Plan: meclizine (ANTIVERT) 25 MG tablet          (H61.22) Impacted cerumen of left ear  Comment: dried cerumen; failed manual extraction with curette; Recommend adding Debrox and home irrigation;   Plan: if does not clear with home irrigation, may return to clinic for irrigation.    Gela Bueno MD  Internal Medicine   [facility]

## 2018-12-14 ENCOUNTER — TRANSFERRED RECORDS (OUTPATIENT)
Dept: HEALTH INFORMATION MANAGEMENT | Facility: CLINIC | Age: 73
End: 2018-12-14

## 2019-03-25 DIAGNOSIS — E78.5 HYPERLIPIDEMIA LDL GOAL <130: ICD-10-CM

## 2019-03-25 DIAGNOSIS — R73.09 ELEVATED GLUCOSE: Primary | ICD-10-CM

## 2019-03-25 DIAGNOSIS — E03.9 HYPOTHYROIDISM: ICD-10-CM

## 2019-04-25 DIAGNOSIS — E78.5 HYPERLIPIDEMIA LDL GOAL <130: ICD-10-CM

## 2019-04-25 DIAGNOSIS — R73.09 ELEVATED GLUCOSE: ICD-10-CM

## 2019-04-25 DIAGNOSIS — E03.9 HYPOTHYROIDISM: ICD-10-CM

## 2019-04-25 LAB
ALBUMIN SERPL-MCNC: 3.6 G/DL (ref 3.4–5)
ALP SERPL-CCNC: 97 U/L (ref 40–150)
ALT SERPL W P-5'-P-CCNC: 25 U/L (ref 0–50)
ANION GAP SERPL CALCULATED.3IONS-SCNC: 6 MMOL/L (ref 3–14)
AST SERPL W P-5'-P-CCNC: 21 U/L (ref 0–45)
BILIRUB SERPL-MCNC: 0.5 MG/DL (ref 0.2–1.3)
BUN SERPL-MCNC: 19 MG/DL (ref 7–30)
CALCIUM SERPL-MCNC: 9.1 MG/DL (ref 8.5–10.1)
CHLORIDE SERPL-SCNC: 107 MMOL/L (ref 94–109)
CHOLEST SERPL-MCNC: 168 MG/DL
CO2 SERPL-SCNC: 28 MMOL/L (ref 20–32)
CREAT SERPL-MCNC: 0.69 MG/DL (ref 0.52–1.04)
GFR SERPL CREATININE-BSD FRML MDRD: 86 ML/MIN/{1.73_M2}
GLUCOSE SERPL-MCNC: 118 MG/DL (ref 70–99)
HBA1C MFR BLD: 5.8 % (ref 0–5.6)
HDLC SERPL-MCNC: 46 MG/DL
LDLC SERPL CALC-MCNC: 94 MG/DL
NONHDLC SERPL-MCNC: 122 MG/DL
POTASSIUM SERPL-SCNC: 4.5 MMOL/L (ref 3.4–5.3)
PROT SERPL-MCNC: 6.8 G/DL (ref 6.8–8.8)
SODIUM SERPL-SCNC: 141 MMOL/L (ref 133–144)
TRIGL SERPL-MCNC: 141 MG/DL
TSH SERPL DL<=0.005 MIU/L-ACNC: 0.68 MU/L (ref 0.4–4)

## 2019-04-25 PROCEDURE — 80061 LIPID PANEL: CPT | Performed by: INTERNAL MEDICINE

## 2019-04-25 PROCEDURE — 80053 COMPREHEN METABOLIC PANEL: CPT | Performed by: INTERNAL MEDICINE

## 2019-04-25 PROCEDURE — 36415 COLL VENOUS BLD VENIPUNCTURE: CPT | Performed by: INTERNAL MEDICINE

## 2019-04-25 PROCEDURE — 83036 HEMOGLOBIN GLYCOSYLATED A1C: CPT | Performed by: INTERNAL MEDICINE

## 2019-04-25 PROCEDURE — 84443 ASSAY THYROID STIM HORMONE: CPT | Performed by: INTERNAL MEDICINE

## 2019-04-27 ASSESSMENT — ENCOUNTER SYMPTOMS
WEAKNESS: 0
DIZZINESS: 0
FEVER: 0
NERVOUS/ANXIOUS: 0
HEMATURIA: 0
DYSURIA: 0
SORE THROAT: 0
ARTHRALGIAS: 0
MYALGIAS: 0
JOINT SWELLING: 0
BREAST MASS: 0
SHORTNESS OF BREATH: 0
CONSTIPATION: 0
NAUSEA: 0
HEADACHES: 1
HEARTBURN: 0
COUGH: 0
PARESTHESIAS: 0
EYE PAIN: 0
HEMATOCHEZIA: 0
FREQUENCY: 0
PALPITATIONS: 0
CHILLS: 0
ABDOMINAL PAIN: 0
DIARRHEA: 0

## 2019-04-27 ASSESSMENT — ACTIVITIES OF DAILY LIVING (ADL): CURRENT_FUNCTION: NO ASSISTANCE NEEDED

## 2019-04-28 ENCOUNTER — OFFICE VISIT (OUTPATIENT)
Dept: URGENT CARE | Facility: URGENT CARE | Age: 74
End: 2019-04-28
Payer: COMMERCIAL

## 2019-04-28 VITALS
BODY MASS INDEX: 31.45 KG/M2 | TEMPERATURE: 98.9 F | DIASTOLIC BLOOD PRESSURE: 78 MMHG | HEIGHT: 61 IN | HEART RATE: 81 BPM | OXYGEN SATURATION: 95 % | SYSTOLIC BLOOD PRESSURE: 130 MMHG | WEIGHT: 166.6 LBS
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
## 2019-04-28 PROCEDURE — 99213 OFFICE O/P EST LOW 20 MIN: CPT | Performed by: PHYSICIAN ASSISTANT

## 2019-04-28 RX ORDER — TRIAMCINOLONE ACETONIDE 1 MG/G
CREAM TOPICAL 2 TIMES DAILY
Qty: 30 G | Refills: 0 | Status: SHIPPED | OUTPATIENT
Start: 2019-04-28 | End: 2019-06-12

## 2019-04-28 ASSESSMENT — MIFFLIN-ST. JEOR: SCORE: 1198.07

## 2019-04-29 NOTE — PATIENT INSTRUCTIONS
(W57.XXXA) Insect bite, initial encounter  (primary encounter diagnosis)  Comment: right arm  Plan: triamcinolone (KENALOG) 0.1 % external cream          Clean baking soda and vinegar off arm with lukewarm water.  Cool compress for comfort.    Steroid cream as needed.      You may try benadryl at bedtime for itching.

## 2019-04-29 NOTE — PROGRESS NOTES
"Patient presents with:  Urgent Care: spider bite x6days     SUBJECTIVE:  Abby Casey is a 74 year old female who presents to the clinic today for a spider bite on her right arm, onset 6 days ago.  No fevers.  It has not bothered her until today, it is suddenly itchy and red and swollen.  She cleaned it with hydrogen peroxide at the onset and then a paste of vinegar and baking soda yesterday, thereafter the area became red.            Past Medical History:   Diagnosis Date     Migraine, unspecified, without mention of intractable migraine without mention of status migrainosus      Other and unspecified hyperlipidemia 1990's    Pravachol, Zocor  Formulary issues, 6/02 Advicor     Unspecified hypothyroidism 1980's     Unspecified noninflammatory disorder of cervix     Atypical cervix - colposcopy 10/99        Allergies   Allergen Reactions     Neurontin [Gabapentin] Visual Disturbance     Amoxicillin Hives     Clindamycin Hcl Diarrhea     Social History     Tobacco Use     Smoking status: Never Smoker     Smokeless tobacco: Never Used   Substance Use Topics     Alcohol use: Yes     Comment: 3 drinks per month       ROS:  CONSTITUTIONAL:NEGATIVE for fever, chills, change in weight  INTEGUMENTARY/SKIN: as per HPI  MUSCULOSKELETAL: NEGATIVE for significant arthralgias or myalgia  NEURO: NEGATIVE for weakness, dizziness or paresthesias  Review of systems negative except as stated above.    EXAM:   /78   Pulse 81   Temp 98.9  F (37.2  C) (Tympanic)   Ht 1.549 m (5' 1\")   Wt 75.6 kg (166 lb 9.6 oz)   SpO2 95%   BMI 31.48 kg/m    GENERAL: alert, no acute distress.  SKIN: Rash description:  4mm open sore on right antecubital fossa with erythematous border.  WHite dry substance in center of lesion.  No drainage.  No vesicles.  No extension of erythema.      (W57.XXXA) Insect bite, initial encounter  (primary encounter diagnosis)  Comment: right arm  Plan: triamcinolone (KENALOG) 0.1 % external cream        "   Clean baking soda and vinegar off arm with lukewarm water.  Cool compress for comfort.    Steroid cream as needed.      You may try benadryl at bedtime for itching.      F/U with PCP should symptoms persist or worsen.      Patient expresses understanding and agreement with the assessment and plan as above.

## 2019-05-02 ENCOUNTER — OFFICE VISIT (OUTPATIENT)
Dept: FAMILY MEDICINE | Facility: CLINIC | Age: 74
End: 2019-05-02
Payer: COMMERCIAL

## 2019-05-02 VITALS
SYSTOLIC BLOOD PRESSURE: 132 MMHG | DIASTOLIC BLOOD PRESSURE: 80 MMHG | BODY MASS INDEX: 30.01 KG/M2 | WEIGHT: 163.1 LBS | OXYGEN SATURATION: 98 % | HEIGHT: 62 IN | TEMPERATURE: 98.3 F | HEART RATE: 80 BPM | RESPIRATION RATE: 16 BRPM

## 2019-05-02 DIAGNOSIS — Z00.00 ROUTINE HISTORY AND PHYSICAL EXAMINATION OF ADULT: Primary | ICD-10-CM

## 2019-05-02 DIAGNOSIS — T63.301A SPIDER BITE WOUND, ACCIDENTAL OR UNINTENTIONAL, INITIAL ENCOUNTER: ICD-10-CM

## 2019-05-02 DIAGNOSIS — E78.5 HYPERLIPIDEMIA LDL GOAL <130: ICD-10-CM

## 2019-05-02 DIAGNOSIS — E03.9 HYPOTHYROIDISM, UNSPECIFIED TYPE: ICD-10-CM

## 2019-05-02 PROCEDURE — 99213 OFFICE O/P EST LOW 20 MIN: CPT | Mod: 25 | Performed by: INTERNAL MEDICINE

## 2019-05-02 PROCEDURE — G0438 PPPS, INITIAL VISIT: HCPCS | Performed by: INTERNAL MEDICINE

## 2019-05-02 RX ORDER — LEVOTHYROXINE SODIUM 112 UG/1
112 TABLET ORAL DAILY
Qty: 90 TABLET | Refills: 3 | Status: SHIPPED | OUTPATIENT
Start: 2019-05-02 | End: 2020-04-23

## 2019-05-02 RX ORDER — SIMVASTATIN 40 MG
40 TABLET ORAL AT BEDTIME
Qty: 90 TABLET | Refills: 3 | Status: SHIPPED | OUTPATIENT
Start: 2019-05-02 | End: 2020-04-23

## 2019-05-02 RX ORDER — DOXYCYCLINE 100 MG/1
100 CAPSULE ORAL 2 TIMES DAILY
Qty: 14 CAPSULE | Refills: 0 | Status: SHIPPED | OUTPATIENT
Start: 2019-05-02 | End: 2019-06-12

## 2019-05-02 ASSESSMENT — ENCOUNTER SYMPTOMS
NAUSEA: 0
CONSTIPATION: 0
MYALGIAS: 0
PALPITATIONS: 0
HEARTBURN: 0
COUGH: 0
PARESTHESIAS: 0
WEAKNESS: 0
BREAST MASS: 0
CHILLS: 0
DYSURIA: 0
SHORTNESS OF BREATH: 0
DIZZINESS: 0
HEMATURIA: 0
FREQUENCY: 0
FEVER: 0
HEADACHES: 1
EYE PAIN: 0
ABDOMINAL PAIN: 0
HEMATOCHEZIA: 0
NERVOUS/ANXIOUS: 0
SORE THROAT: 0
ARTHRALGIAS: 0
DIARRHEA: 0
JOINT SWELLING: 0

## 2019-05-02 ASSESSMENT — MIFFLIN-ST. JEOR: SCORE: 1190.13

## 2019-05-02 ASSESSMENT — ACTIVITIES OF DAILY LIVING (ADL): CURRENT_FUNCTION: NO ASSISTANCE NEEDED

## 2019-05-02 NOTE — PROGRESS NOTES
"Chief Complaint   Patient presents with     Physical     Lipids     Thyroid Problem     Insect Bites     spider bite on right arm at the elbow   was seen in urgent care  4/28/19         SUBJECTIVE:   Abby Casey is a 74 year old female who presents for Preventive Visit.    Are you in the first 12 months of your Medicare coverage?  No    Healthy Habits:     In general, how would you rate your overall health?  Good    Frequency of exercise:  2-3 days/week    Duration of exercise:  15-30 minutes    Do you usually eat at least 4 servings of fruit and vegetables a day, include whole grains    & fiber and avoid regularly eating high fat or \"junk\" foods?  Yes    Taking medications regularly:  Yes    Medication side effects:  None    Ability to successfully perform activities of daily living:  No assistance needed    Home Safety:  No safety concerns identified    Hearing Impairment:  No hearing concerns    In the past 6 months, have you been bothered by leaking of urine?  No    In general, how would you rate your overall mental or emotional health?  Excellent      PHQ-2 Total Score: 0    Additional concerns today:  No    Do you feel safe in your environment? Yes    Do you have a Health Care Directive? Yes: Patient states has Advance Directive and will bring in a copy to clinic.      Fall risk  Fallen 2 or more times in the past year?: No  Any fall with injury in the past year?: No    Cognitive Screening   1) Repeat 3 items (Leader, Season, Table)    2) Clock draw: NORMAL  3) 3 item recall: Recalls 3 objects  Results: 3 items recalled: COGNITIVE IMPAIRMENT LESS LIKELY    Mini-CogTM Copyright S Ghada. Licensed by the author for use in [facility]; reprinted with permission (chanel@.[facility]). All rights reserved.      Do you have sleep apnea, excessive snoring or daytime drowsiness?: no    Reviewed and updated as needed this visit by clinical staff  Tobacco  Allergies  Meds  Med Hx  Surg Hx  Fam Hx  Soc Hx "        Reviewed and updated as needed this visit by Provider  Tobacco  Allergies  Meds  Problems  Med Hx  Surg Hx  Fam Hx        Social History     Tobacco Use     Smoking status: Never Smoker     Smokeless tobacco: Never Used   Substance Use Topics     Alcohol use: Yes     Comment: 3 drinks per month         Alcohol Use 4/27/2019   Prescreen: >3 drinks/day or >7 drinks/week? No   Prescreen: >3 drinks/day or >7 drinks/week? -           spider bite on right arm at the elbow was seen in urgent care 4/28/19      Duration: for about 2 weeks    Description (location/character/radiation): area is scabbed and slight redness around the area    Intensity:  mild    Accompanying signs and symptoms: slight itching and has lump under the area    History (similar episodes/previous evaluation): was seen in urgent care    Precipitating or alleviating factors: None    Therapies tried and outcome: anti itch cream and cold compresses, Benadryl prn     Hyperlipidemia Follow-Up      Rate your low fat/cholesterol diet?: fair    Taking statin?  Yes, no muscle aches from statin    Other lipid medications/supplements?:  none    Hypothyroidism Follow-up      Since last visit, patient describes the following symptoms: Weight stable, no hair loss, no skin changes, no constipation, no loose stools      Current providers sharing in care for this patient include:   Patient Care Team:  Gela Bueno MD as PCP - General  Gela Bueno MD as Assigned PCP    The following health maintenance items are reviewed in Epic and correct as of today:  Health Maintenance   Topic Date Due     ZOSTER IMMUNIZATION (2 of 3) 05/30/2012     ADVANCE DIRECTIVE PLANNING Q5 YRS  04/21/2019     MEDICARE ANNUAL WELLNESS VISIT  04/30/2019     FALL RISK ASSESSMENT  04/30/2019     MAMMO Q1 YR  06/18/2019     TSH Q1 YEAR  04/25/2020     LIPID MONITORING Q1 YEAR  04/25/2020     DTAP/TDAP/TD IMMUNIZATION (3 - Td) 09/15/2020     COLONOSCOPY Q10 YR   11/16/2026     DEXA SCAN SCREENING (SYSTEM ASSIGNED)  Completed     PHQ-2  Completed     INFLUENZA VACCINE  Completed     PNEUMOCOCCAL IMMUNIZATION 65+ LOW/MEDIUM RISK  Completed     HEPATITIS C SCREENING  Completed     IPV IMMUNIZATION  Aged Out     MENINGITIS IMMUNIZATION  Aged Out     Labs reviewed in EPIC  BP Readings from Last 3 Encounters:   05/02/19 132/80   04/28/19 130/78   10/22/18 134/64    Wt Readings from Last 3 Encounters:   05/02/19 74 kg (163 lb 1.6 oz)   04/28/19 75.6 kg (166 lb 9.6 oz)   10/22/18 75.7 kg (166 lb 12.8 oz)                  Patient Active Problem List   Diagnosis     Hypothyroidism     Abnormal blood chemistry     Disorder of bone and cartilage     Diverticulosis of large intestine     Lichen sclerosus et atrophicus of the vulva     HYPERLIPIDEMIA LDL GOAL <130     Elevated glucose     Advanced directives, counseling/discussion     Papanicolaou smear of cervix with low grade squamous intraepithelial lesion (LGSIL)     Acute pain of right shoulder     Right rotator cuff tear     Rupture of right proximal biceps tendon     Tendinopathy of right rotator cuff     Osteoarthritis of right acromioclavicular joint     Adjustment disorder with anxious mood     Obesity     Past Surgical History:   Procedure Laterality Date     C NONSPECIFIC PROCEDURE  10/99    Colposcopy--reactive atypia         COLONOSCOPY      2006 and 2016     COLONOSCOPY N/A 11/16/2016    Procedure: COLONOSCOPY;  Surgeon: Adriano Roberto MD, MD;  Location:  GI     DILATION AND CURETTAGE, OPERATIVE HYSTEROSCOPY WITH MORCELLATOR, COMBINED N/A 05/16/2017    endometrial polypectomy for PMB     DILATION AND CURETTAGE, OPERATIVE HYSTEROSCOPY WITH MORCELLATOR, COMBINED N/A 5/16/2017    Procedure: COMBINED DILATION AND CURETTAGE, OPERATIVE HYSTEROSCOPY WITH MORCELLATOR;   DILATION AND CURETTAGE, OPERATIVE HYSTEROSCOPY WITH MORCELLATOR;  Surgeon: Negra Olivia MD;  Location:  OR     EYE SURGERY  2012    Bilateral cataract  extraction     HC REMOVAL GALLBLADDER      [facility]       TUBAL LIGATION         Social History     Tobacco Use     Smoking status: Never Smoker     Smokeless tobacco: Never Used   Substance Use Topics     Alcohol use: Yes     Comment: 3 drinks per month     Family History   Problem Relation Age of Onset     Heart Disease Father          @77     Cancer Father         Liver Cancer, Eye  Cancer     Diabetes Brother      Diabetes Brother      Diabetes Sister      Colon Cancer No family hx of          Current Outpatient Medications   Medication Sig Dispense Refill     ASPIRIN 81 MG OR TABS ONE DAILY 100 3     CALCIUM + D 600-200 MG-UNIT OR TABS 1 TABLET DAILY       Chromium-Cinnamon 100-500 MCG-MG CAPS Take 1 tablet by mouth 2 times daily       clobetasol (TEMOVATE) 0.05 % cream use at bedtime weekly at bedtime for maintenance. Twice weekly for the next two months. 15 g 1     doxycycline monohydrate (MONODOX) 100 MG capsule Take 1 capsule (100 mg) by mouth 2 times daily 14 capsule 0     guaiFENesin (MUCINEX) 600 MG 12 hr tablet Take 2 tablets by mouth 2 times daily as needed for congestion. 40 tablet 0     levothyroxine (SYNTHROID/LEVOTHROID) 112 MCG tablet Take 1 tablet (112 mcg) by mouth daily 90 tablet 3     MULTIVITAMIN OR 1 daily       NAPROSYN 500 MG OR TABS 1 TABLET TWICE DAILY PRN  1     PROSHIELD PLUS SKIN PROTECTANT 1 % EX CREA use as needed  up to QID 1 tube 1     simvastatin (ZOCOR) 40 MG tablet Take 1 tablet (40 mg) by mouth At Bedtime 90 tablet 3     triamcinolone (KENALOG) 0.1 % external cream Apply topically 2 times daily 30 g 0     Allergies   Allergen Reactions     Neurontin [Gabapentin] Visual Disturbance     Amoxicillin Hives     Clindamycin Hcl Diarrhea     Mammogram Screening: Mammogram Screening: Patient over age 50, mutual decision to screen reflected in health maintenance.    Review of Systems   Constitutional: Negative for chills and fever.   HENT: Negative for congestion, ear pain,  "hearing loss and sore throat.    Eyes: Negative for pain and visual disturbance.   Respiratory: Negative for cough and shortness of breath.    Cardiovascular: Negative for chest pain, palpitations and peripheral edema.   Gastrointestinal: Negative for abdominal pain, constipation, diarrhea, heartburn, hematochezia and nausea.   Breasts:  Negative for tenderness, breast mass and discharge.   Genitourinary: Negative for dysuria, frequency, genital sores, hematuria, pelvic pain, urgency, vaginal bleeding and vaginal discharge.   Musculoskeletal: Negative for arthralgias, joint swelling and myalgias.   Skin: Negative for rash.   Neurological: Positive for headaches (occasional; sinus related. ). Negative for dizziness, weakness and paresthesias.   Psychiatric/Behavioral: Negative for mood changes. The patient is not nervous/anxious.          OBJECTIVE:   /80   Pulse 80   Temp 98.3  F (36.8  C) (Oral)   Resp 16   Ht 1.562 m (5' 1.5\")   Wt 74 kg (163 lb 1.6 oz)   SpO2 98%   BMI 30.32 kg/m   Estimated body mass index is 30.32 kg/m  as calculated from the following:    Height as of this encounter: 1.562 m (5' 1.5\").    Weight as of this encounter: 74 kg (163 lb 1.6 oz).  Physical Exam  GENERAL: healthy, alert and no distress  HENT: ear canals and TM's normal, nose and mouth without ulcers or lesions  NECK: no adenopathy, no asymmetry, masses, or scars and thyroid normal to palpation  RESP: lungs clear to auscultation - no rales, rhonchi or wheezes  BREAST: normal without masses, tenderness or nipple discharge and no palpable axillary masses or adenopathy  CV: regular rates and rhythm, normal S1 S2,  no murmur, click or rub, peripheral pulses strong and no peripheral edema  ABDOMEN: soft, nontender and bowel sounds normal  MS: no gross musculoskeletal defects noted, no edema  SKIN: right  Arm near elbow flexure necrotic center; surrounding induration, mild; range of motion is full  NEURO: Normal strength and " "tone, mentation intact and speech normal  PSYCH: mentation appears normal, affect normal/bright      ASSESSMENT / PLAN:   (Z00.00) Routine history and physical examination of adult  (primary encounter diagnosis)  Comment: Life line screening done 12/14/2018; reviewed report with pt; Colonosocopy done 2016, mammo 2018, desires peter 2020, discussed shingrix- considering.   Plan:           (E03.9) Hypothyroidism, unspecified type  Comment:   TSH   Date Value Ref Range Status   04/25/2019 0.68 0.40 - 4.00 mU/L Final    recent labs reviewed; continue same dose  Plan: levothyroxine (SYNTHROID/LEVOTHROID) 112 MCG         tablet          (E78.5) Hyperlipidemia LDL goal <130  Comment: statin dose and labs reviewed; well tolerated  Plan: simvastatin (ZOCOR) 40 MG tablet          (T63.301A) Spider bite wound, accidental or unintentional, initial encounter  Comment: pt witnessed bite 10 days ago; late reaction; necrotic center and surrounding erythema/induration; secondary infection?. Early secondary infection; reviewed allergies and consider antibiotics.  Plan: doxycycline monohydrate (MONODOX) 100 MG         capsule          End of Life Planning:  Patient currently has an advanced directive: as noted    COUNSELING:  Reviewed preventive health counseling, as reflected in patient instructions       Regular exercise       Healthy diet/nutrition    BP Readings from Last 1 Encounters:   05/02/19 132/80     Estimated body mass index is 30.32 kg/m  as calculated from the following:    Height as of this encounter: 1.562 m (5' 1.5\").    Weight as of this encounter: 74 kg (163 lb 1.6 oz).    BP Screening:   Last 3 BP Readings:    BP Readings from Last 3 Encounters:   05/02/19 132/80   04/28/19 130/78   10/22/18 134/64       The following was recommended to the patient:  Re-screen BP within a year and recommended lifestyle modifications  Weight management plan: Discussed healthy diet and exercise guidelines     reports that she has " never smoked. She has never used smokeless tobacco.      Appropriate preventive services were discussed with this patient, including applicable screening as appropriate for cardiovascular disease, diabetes, osteopenia/osteoporosis, and glaucoma.  As appropriate for age/gender, discussed screening for colorectal cancer, prostate cancer, breast cancer, and cervical cancer. Checklist reviewing preventive services available has been given to the patient.    Reviewed patients plan of care and provided an AVS. The Basic Care Plan (routine screening as documented in Health Maintenance) for Abby meets the Care Plan requirement. This Care Plan has been established and reviewed with the Patient.    Counseling Resources:  ATP IV Guidelines  Pooled Cohorts Equation Calculator  Breast Cancer Risk Calculator  FRAX Risk Assessment  ICSI Preventive Guidelines  Dietary Guidelines for Americans, 2010  [employer]'s MyPlate  ASA Prophylaxis  Lung CA Screening    Gela Buneo MD  Internal Medicine   [facility]  15 minutes in addition to HEALTH CARE MAINTENANCE are spent with patient, over 50% of that time spent providing counselling, discussing and reviewing medical conditions/concerns, meds and potential side effects.      Identified Health Risks:

## 2019-05-02 NOTE — PATIENT INSTRUCTIONS
Patient Education   Personalized Prevention Plan  You are due for the preventive services outlined below.  Your care team is available to assist you in scheduling these services.  If you have already completed any of these items, please share that information with your care team to update in your medical record.  Health Maintenance Due   Topic Date Due     Zoster (Shingles) Vaccine (2 of 3) 05/30/2012     Annual Wellness Visit  04/30/2019     FALL RISK ASSESSMENT  04/30/2019     Preventive Health Recommendations    See your health care provider every year to    Review health changes.     Discuss preventive care.      Review your medicines if your doctor has prescribed any.    You no longer need a yearly Pap test unless you've had an abnormal Pap test in the past 10 years. If you have vaginal symptoms, such as bleeding or discharge, be sure to talk with your provider about a Pap test.    Every 1 to 2 years, have a mammogram.  If you are over 69, talk with your health care provider about whether or not you want to continue having screening mammograms.    Every 10 years, have a colonoscopy. Or, have a yearly FIT test (stool test). These exams will check for colon cancer.     Have a cholesterol test every 5 years, or more often if your doctor advises it.     Have a diabetes test (fasting glucose) every three years. If you are at risk for diabetes, you should have this test more often.     At age 65, have a bone density scan (DEXA) to check for osteoporosis (brittle bone disease).    Shots:    Get a flu shot each year.    Get a tetanus shot every 10 years.    Talk to your doctor about your pneumonia vaccines. There are now two you should receive - Pneumovax (PPSV 23) and Prevnar (PCV 13).    Talk to your pharmacist about the shingles vaccine.    Talk to your doctor about the hepatitis B vaccine.    Nutrition:     Eat at least 5 servings of fruits and vegetables each day.    Eat whole-grain bread, whole-wheat pasta and  brown rice instead of white grains and rice.    Get adequate Calcium and Vitamin D.     Lifestyle    Exercise at least 150 minutes a week (30 minutes a day, 5 days a week). This will help you control your weight and prevent disease.    Limit alcohol to one drink per day.    No smoking.     Wear sunscreen to prevent skin cancer.     See your dentist twice a year for an exam and cleaning.    See your eye doctor every 1 to 2 years to screen for conditions such as glaucoma, macular degeneration and cataracts.    Personalized Prevention Plan  You are due for the preventive services outlined below.  Your care team is available to assist you in scheduling these services.  If you have already completed any of these items, please share that information with your care team to update in your medical record.  Health Maintenance Due   Topic Date Due     Zoster (Shingles) Vaccine (2 of 3) 05/30/2012     Annual Wellness Visit  04/30/2019     FALL RISK ASSESSMENT  04/30/2019        Patient Education   Personalized Prevention Plan  You are due for the preventive services outlined below.  Your care team is available to assist you in scheduling these services.  If you have already completed any of these items, please share that information with your care team to update in your medical record.  Health Maintenance Due   Topic Date Due     Zoster (Shingles) Vaccine (2 of 3) 05/30/2012     Annual Wellness Visit  04/30/2019     FALL RISK ASSESSMENT  04/30/2019     Preventive Health Recommendations    See your health care provider every year to    Review health changes.     Discuss preventive care.      Review your medicines if your doctor has prescribed any.    You no longer need a yearly Pap test unless you've had an abnormal Pap test in the past 10 years. If you have vaginal symptoms, such as bleeding or discharge, be sure to talk with your provider about a Pap test.    Every 1 to 2 years, have a mammogram.  If you are over 69, talk with  your health care provider about whether or not you want to continue having screening mammograms.    Every 10 years, have a colonoscopy. Or, have a yearly FIT test (stool test). These exams will check for colon cancer.     Have a cholesterol test every 5 years, or more often if your doctor advises it.     Have a diabetes test (fasting glucose) every three years. If you are at risk for diabetes, you should have this test more often.     At age 65, have a bone density scan (DEXA) to check for osteoporosis (brittle bone disease).    Shots:    Get a flu shot each year.    Get a tetanus shot every 10 years.    Talk to your doctor about your pneumonia vaccines. There are now two you should receive - Pneumovax (PPSV 23) and Prevnar (PCV 13).    Talk to your pharmacist about the shingles vaccine.    Talk to your doctor about the hepatitis B vaccine.    Nutrition:     Eat at least 5 servings of fruits and vegetables each day.    Eat whole-grain bread, whole-wheat pasta and brown rice instead of white grains and rice.    Get adequate Calcium and Vitamin D.     Lifestyle    Exercise at least 150 minutes a week (30 minutes a day, 5 days a week). This will help you control your weight and prevent disease.    Limit alcohol to one drink per day.    No smoking.     Wear sunscreen to prevent skin cancer.     See your dentist twice a year for an exam and cleaning.    See your eye doctor every 1 to 2 years to screen for conditions such as glaucoma, macular degeneration and cataracts.    Personalized Prevention Plan  You are due for the preventive services outlined below.  Your care team is available to assist you in scheduling these services.  If you have already completed any of these items, please share that information with your care team to update in your medical record.  Health Maintenance Due   Topic Date Due     Zoster (Shingles) Vaccine (2 of 3) 05/30/2012     Annual Wellness Visit  04/30/2019     FALL RISK ASSESSMENT   04/30/2019        Patient Education   Personalized Prevention Plan  You are due for the preventive services outlined below.  Your care team is available to assist you in scheduling these services.  If you have already completed any of these items, please share that information with your care team to update in your medical record.  Health Maintenance Due   Topic Date Due     Zoster (Shingles) Vaccine (2 of 3) 05/30/2012

## 2019-05-21 ENCOUNTER — ALLIED HEALTH/NURSE VISIT (OUTPATIENT)
Dept: INTERNAL MEDICINE | Facility: CLINIC | Age: 74
End: 2019-05-21
Payer: COMMERCIAL

## 2019-05-21 VITALS — SYSTOLIC BLOOD PRESSURE: 156 MMHG | DIASTOLIC BLOOD PRESSURE: 77 MMHG

## 2019-05-21 DIAGNOSIS — Z01.30 BP CHECK: Primary | ICD-10-CM

## 2019-05-21 PROCEDURE — 99207 ZZC NO CHARGE NURSE ONLY: CPT | Performed by: INTERNAL MEDICINE

## 2019-05-21 NOTE — Clinical Note
BP high today, patient feeling anxious and higher BP trend noticed last 2 years since stopping propranolol.

## 2019-05-21 NOTE — Clinical Note
Pt saw PCP 5/2/2019BLOOD PRESSURE elevated in the interim.Recommend follow up with PCP Please assist with scheduling.

## 2019-05-21 NOTE — PROGRESS NOTES
Routing message to PCP for review because BP checked at pharmacy is above goal. Recommended patient to follow-up with PCP.  PCP please close this encounter.

## 2019-05-31 ENCOUNTER — ALLIED HEALTH/NURSE VISIT (OUTPATIENT)
Dept: FAMILY MEDICINE | Facility: CLINIC | Age: 74
End: 2019-05-31
Payer: COMMERCIAL

## 2019-05-31 VITALS — SYSTOLIC BLOOD PRESSURE: 142 MMHG | DIASTOLIC BLOOD PRESSURE: 78 MMHG

## 2019-05-31 DIAGNOSIS — Z01.30 BP CHECK: Primary | ICD-10-CM

## 2019-05-31 PROCEDURE — 99207 ZZC NO CHARGE NURSE ONLY: CPT | Performed by: INTERNAL MEDICINE

## 2019-05-31 NOTE — NURSING NOTE
Abby Casey was evaluated at [facility] on May 31, 2019 at which time her blood pressure was:    BP Readings from Last 3 Encounters:   05/31/19 142/78   05/21/19 156/77   05/02/19 132/80     Pulse Readings from Last 3 Encounters:   05/02/19 80   04/28/19 81   10/22/18 74       Reviewed lifestyle modifications for blood pressure control and reduction: including making healthy food choices, managing weight, getting regular exercise, smoking cessation, reducing alcohol consumption, monitoring blood pressure regularly.     Symptoms: None    BP Goal:Other: 140/90    BP Assessment:  BP at goal    Potential Reasons for BP too high: NA - Not applicable    BP Follow-Up Plan: Recheck BP in 30 days at pharmacy    Recommendation to Provider: review after another reading    Note completed by:  Tabitha Navarro [facility]   [phone]

## 2019-06-12 ENCOUNTER — OFFICE VISIT (OUTPATIENT)
Dept: FAMILY MEDICINE | Facility: CLINIC | Age: 74
End: 2019-06-12
Payer: COMMERCIAL

## 2019-06-12 VITALS
WEIGHT: 163 LBS | OXYGEN SATURATION: 98 % | BODY MASS INDEX: 30.3 KG/M2 | TEMPERATURE: 98 F | RESPIRATION RATE: 16 BRPM | SYSTOLIC BLOOD PRESSURE: 132 MMHG | DIASTOLIC BLOOD PRESSURE: 78 MMHG | HEART RATE: 71 BPM

## 2019-06-12 DIAGNOSIS — I10 ESSENTIAL HYPERTENSION: Primary | ICD-10-CM

## 2019-06-12 PROCEDURE — 99214 OFFICE O/P EST MOD 30 MIN: CPT | Performed by: INTERNAL MEDICINE

## 2019-06-12 RX ORDER — PROPRANOLOL HYDROCHLORIDE 40 MG/1
40 TABLET ORAL 2 TIMES DAILY
Qty: 60 TABLET | Refills: 1 | Status: SHIPPED | OUTPATIENT
Start: 2019-06-12 | End: 2019-08-07

## 2019-06-12 NOTE — PROGRESS NOTES
Subjective     Abby Casey is a 74 year old female who presents to clinic today for the following health issues:    HPI   Hypertension Follow-up      Do you check your blood pressure regularly outside of the clinic? No     Are you following a low salt diet? Yes    Are your blood pressures ever more than 140 on the top number (systolic) OR more   than 90 on the bottom number (diastolic), for example 140/90? Yes    Amount of exercise or physical activity: is very busy but no specific exercise program    Problems taking medications regularly: No    Medication side effects: none    Diet: regular (no restrictions)        Patient Active Problem List   Diagnosis     Hypothyroidism     Abnormal blood chemistry     Disorder of bone and cartilage     Diverticulosis of large intestine     Lichen sclerosus et atrophicus of the vulva     HYPERLIPIDEMIA LDL GOAL <130     Elevated glucose     Advanced directives, counseling/discussion     Papanicolaou smear of cervix with low grade squamous intraepithelial lesion (LGSIL)     Acute pain of right shoulder     Right rotator cuff tear     Rupture of right proximal biceps tendon     Tendinopathy of right rotator cuff     Osteoarthritis of right acromioclavicular joint     Adjustment disorder with anxious mood     Obesity     Past Surgical History:   Procedure Laterality Date     C NONSPECIFIC PROCEDURE  10/99    Colposcopy--reactive atypia         COLONOSCOPY      2006 and 2016     COLONOSCOPY N/A 11/16/2016    Procedure: COLONOSCOPY;  Surgeon: Adriano Roberto MD, MD;  Location: [facility]     DILATION AND CURETTAGE, OPERATIVE HYSTEROSCOPY WITH MORCELLATOR, COMBINED N/A 05/16/2017    endometrial polypectomy for PMB     DILATION AND CURETTAGE, OPERATIVE HYSTEROSCOPY WITH MORCELLATOR, COMBINED N/A 5/16/2017    Procedure: COMBINED DILATION AND CURETTAGE, OPERATIVE HYSTEROSCOPY WITH MORCELLATOR;   DILATION AND CURETTAGE, OPERATIVE HYSTEROSCOPY WITH MORCELLATOR;  Surgeon: Negra Olivia,  MD;  Location: [facility]     EYE SURGERY      Bilateral cataract extraction     HC REMOVAL GALLBLADDER      [facility]go[facility]       TUBAL LIGATION         Social History     Tobacco Use     Smoking status: Never Smoker     Smokeless tobacco: Never Used   Substance Use Topics     Alcohol use: Yes     Comment: 3 drinks per month     Family History   Problem Relation Age of Onset     Heart Disease Father          @77     Cancer Father         Liver Cancer, Eye  Cancer     Diabetes Brother      Diabetes Brother      Diabetes Sister      Colon Cancer No family hx of          Current Outpatient Medications   Medication Sig Dispense Refill     ASPIRIN 81 MG OR TABS ONE DAILY 100 3     CALCIUM + D 600-200 MG-UNIT OR TABS 1 TABLET DAILY       Chromium-Cinnamon 100-500 MCG-MG CAPS Take 1 tablet by mouth 2 times daily       clobetasol (TEMOVATE) 0.05 % cream use at bedtime weekly at bedtime for maintenance. Twice weekly for the next two months. 15 g 1     guaiFENesin (MUCINEX) 600 MG 12 hr tablet Take 2 tablets by mouth 2 times daily as needed for congestion. 40 tablet 0     levothyroxine (SYNTHROID/LEVOTHROID) 112 MCG tablet Take 1 tablet (112 mcg) by mouth daily 90 tablet 3     MULTIVITAMIN OR 1 daily       NAPROSYN 500 MG OR TABS 1 TABLET TWICE DAILY PRN  1     PROSHIELD PLUS SKIN PROTECTANT 1 % EX CREA use as needed  up to QID 1 tube 1     simvastatin (ZOCOR) 40 MG tablet Take 1 tablet (40 mg) by mouth At Bedtime 90 tablet 3     halobetasol (ULTRAVATE) 0.05 % external cream Use at bedtime weekly for maintenance of Lichen sclerosis. Pt requesting alternative to Clobetasol due to cost. 50 g 1     propranolol (INDERAL) 40 MG tablet Take 1 tablet (40 mg) by mouth 2 times daily 60 tablet 0     Allergies   Allergen Reactions     Neurontin [Gabapentin] Visual Disturbance     Amoxicillin Hives     Clindamycin Hcl Diarrhea     BP Readings from Last 3 Encounters:           19 132/78    Wt Readings from Last 3 Encounters:    06/12/19 73.9 kg (163 lb)   05/02/19 74 kg (163 lb 1.6 oz)   04/28/19 75.6 kg (166 lb 9.6 oz)            Reviewed and updated as needed this visit by Provider  Tobacco  Allergies  Meds  Problems  Med Hx  Surg Hx  Fam Hx         Review of Systems   ROS COMP: CONSTITUTIONAL: NEGATIVE for fever, chills, change in weight  ENT/MOUTH: NEGATIVE for ear, mouth and throat problems  RESP: NEGATIVE for significant cough or SOB  CV: NEGATIVE for chest pain, palpitations or peripheral edema  GI: NEGATIVE for nausea, abdominal pain, heartburn, or change in bowel habits  MUSCULOSKELETAL: NEGATIVE for significant arthralgias or myalgia  NEURO: NEGATIVE for weakness, dizziness or paresthesias  PSYCHIATRIC: NEGATIVE for changes in mood or affect      Objective    /78   Pulse 71   Temp 98  F (36.7  C) (Oral)   Resp 16   Wt 73.9 kg (163 lb)   SpO2 98%   BMI 30.30 kg/m    Body mass index is 30.3 kg/m .  Physical Exam   GENERAL: healthy, alert and no distress  NECK: no adenopathy, no asymmetry, masses, or scars and thyroid normal to palpation  RESP: lungs clear to auscultation - no rales, rhonchi or wheezes  CV: regular rates and rhythm, normal S1 S2, no S3 or S4, peripheral pulses strong and no peripheral edema  ABDOMEN: soft, nontender and bowel sounds normal  MS: no gross musculoskeletal defects noted, no edema  NEURO: Normal strength and tone, mentation intact and speech normal  PSYCH: mentation appears normal, affect normal/bright        Assessment & Plan     (I10) Essential hypertension  (primary encounter diagnosis)  Comment: Reviewed blood pressure readings.  Has been running higher at home.  Discussed options and previous treatments.  She is agreeable to resuming propranolol 40 mg twice per day, monitoring blood pressure and having a nurse only blood pressure check in 2 to 3 weeks.  If blood pressure is well controlled could then change prescription to a 90-day supply and sent to her pharmacy  Plan:   "propranolol (INDERAL) 40 MG  tablet        Continue regular activity and healthy.  Recommend low-salt diet.       BMI:   Estimated body mass index is 30.3 kg/m  as calculated from the following:    Height as of 5/2/19: 1.562 m (5' 1.5\").    Weight as of this encounter: 73.9 kg (163 lb).           Return in about 2 weeks (around 6/26/2019) for BP Recheck.    Gela Bueno MD  Internal Medicine   [facility]    Patient Instructions   Elevated Blood Pressure    Resume Propranolol 40 mg  Twice per day.    Recheck BLOOD PRESSURE at  pharmacy or nurse visit in clinic after on medication 2-3 weeks;   Can then change Rx to 90 day supply.            "

## 2019-06-12 NOTE — PATIENT INSTRUCTIONS
Elevated Blood Pressure    Resume Propranolol 40 mg  Twice per day.    Recheck BLOOD PRESSURE at  pharmacy or nurse visit in clinic after on medication 2-3 weeks;   Can then change Rx to 90 day supply.

## 2019-06-24 ENCOUNTER — ALLIED HEALTH/NURSE VISIT (OUTPATIENT)
Dept: FAMILY MEDICINE | Facility: CLINIC | Age: 74
End: 2019-06-24
Payer: COMMERCIAL

## 2019-06-24 VITALS — DIASTOLIC BLOOD PRESSURE: 60 MMHG | SYSTOLIC BLOOD PRESSURE: 132 MMHG

## 2019-06-24 DIAGNOSIS — I10 ESSENTIAL HYPERTENSION: Primary | ICD-10-CM

## 2019-06-24 PROCEDURE — 99207 ZZC NO CHARGE NURSE ONLY: CPT | Performed by: INTERNAL MEDICINE

## 2019-06-24 NOTE — PROGRESS NOTES
I have reviewed the note and approve the plan.    Lee Matamoros, PharmD  [facility]  [phone]

## 2019-06-24 NOTE — PROGRESS NOTES
Abby Casey was evaluated at [facility] on June 24, 2019 at which time her blood pressure was:    BP Readings from Last 3 Encounters:   06/24/19 132/60   06/12/19 132/78   05/31/19 142/78     Pulse Readings from Last 3 Encounters:   06/12/19 71   05/02/19 80   04/28/19 81       Reviewed lifestyle modifications for blood pressure control and reduction: including making healthy food choices, managing weight, getting regular exercise, smoking cessation, reducing alcohol consumption, monitoring blood pressure regularly.     Symptoms: None    BP Goal:< 140/90 mmHg    BP Assessment:  BP at goal    Potential Reasons for BP too high: NA - Not applicable    BP Follow-Up Plan: Recheck BP in 6 months at pharmacy    Recommendation to Provider: Continue current therapy.     Note completed by:     Zena Bergeron, 2nd Year Student Pharmacist  [facility]  [phone]

## 2019-07-08 ENCOUNTER — ALLIED HEALTH/NURSE VISIT (OUTPATIENT)
Dept: FAMILY MEDICINE | Facility: CLINIC | Age: 74
End: 2019-07-08
Payer: COMMERCIAL

## 2019-07-08 VITALS — DIASTOLIC BLOOD PRESSURE: 66 MMHG | SYSTOLIC BLOOD PRESSURE: 136 MMHG

## 2019-07-08 DIAGNOSIS — E66.9 OBESITY: Primary | ICD-10-CM

## 2019-07-08 PROCEDURE — 99207 ZZC NO CHARGE NURSE ONLY: CPT | Performed by: INTERNAL MEDICINE

## 2019-07-08 NOTE — PROGRESS NOTES
Abby Casey was evaluated at [facility] on July 8, 2019 at which time her blood pressure was:    BP Readings from Last 3 Encounters:   07/08/19 136/66   06/24/19 132/60   06/12/19 132/78     Pulse Readings from Last 3 Encounters:   06/12/19 71   05/02/19 80   04/28/19 81       Reviewed lifestyle modifications for blood pressure control and reduction: including making healthy food choices, managing weight, getting regular exercise, smoking cessation, reducing alcohol consumption, monitoring blood pressure regularly.     Symptoms: None    BP Goal:< 140/90 mmHg    BP Assessment:  BP at goal    Potential Reasons for BP too high: NA - Not applicable    BP Follow-Up Plan: Recheck BP in 6 months at pharmacy    Recommendation to Provider: Continue current therapy    Note completed by: Lee Matamoros, PharmD  [facility]  [phone]

## 2019-07-09 ENCOUNTER — MYC REFILL (OUTPATIENT)
Dept: FAMILY MEDICINE | Facility: CLINIC | Age: 74
End: 2019-07-09

## 2019-07-09 DIAGNOSIS — N90.4 LICHEN SCLEROSUS ET ATROPHICUS OF THE VULVA: ICD-10-CM

## 2019-07-09 DIAGNOSIS — I10 ESSENTIAL HYPERTENSION: ICD-10-CM

## 2019-07-09 RX ORDER — PROPRANOLOL HYDROCHLORIDE 40 MG/1
40 TABLET ORAL 2 TIMES DAILY
Qty: 60 TABLET | Refills: 1 | Status: CANCELLED | OUTPATIENT
Start: 2019-07-09

## 2019-07-09 RX ORDER — CLOBETASOL PROPIONATE 0.5 MG/G
CREAM TOPICAL
Qty: 15 G | Refills: 1 | Status: CANCELLED | OUTPATIENT
Start: 2019-07-09

## 2019-07-12 RX ORDER — CLOBETASOL PROPIONATE 0.5 MG/G
CREAM TOPICAL
Qty: 15 G | Refills: 1 | Status: CANCELLED | OUTPATIENT
Start: 2019-07-12

## 2019-07-12 NOTE — TELEPHONE ENCOUNTER
clobestasol 0.5% cream    Last Written Prescription Date:  4/30/2018  Last Fill Quantity: 15g,  # refills: 1   Last office visit: No previous visit found with prescribing provider:  6/12/2019   Future Office Visit:      Requested Prescriptions   Pending Prescriptions Disp Refills     clobetasol (TEMOVATE) 0.05 % external cream 15 g 1     Sig: use at bedtime weekly at bedtime for maintenance. Twice weekly for the next two months.       There is no refill protocol information for this order        Routing refill request to provider for review/approval because:  Drug not on the [facility] refill protocol     Yana Bowman RN

## 2019-07-23 DIAGNOSIS — N90.4 LICHEN SCLEROSUS ET ATROPHICUS OF THE VULVA: ICD-10-CM

## 2019-07-23 NOTE — TELEPHONE ENCOUNTER
Routing refill request to provider for review/approval because:  Drug not on the [facility] refill protocol     Sharee DYERN, RN

## 2019-07-23 NOTE — TELEPHONE ENCOUNTER
Requested Prescriptions   Pending Prescriptions Disp Refills     clobetasol (TEMOVATE) 0.05 % external cream [Pharmacy Med Name: Clobetasol Propionate External Cream 0.05 %] 30 g 0     Sig: USE AT BEDTIME WEEKLY FOR MAINTENANCE. USE TWICE WEEKLY FOR THE NEXT 2 MONTHS.       There is no refill protocol information for this order        Last Written Prescription Date:  4/30/18  Last Fill Quantity: 15 g,  # refills: 1   Last office visit: 6/12/19 with prescribing provider:  Gela Bueno MD   Future Office Visit:

## 2019-07-24 ENCOUNTER — MYC REFILL (OUTPATIENT)
Dept: FAMILY MEDICINE | Facility: CLINIC | Age: 74
End: 2019-07-24

## 2019-07-24 DIAGNOSIS — N90.4 LICHEN SCLEROSUS ET ATROPHICUS OF THE VULVA: ICD-10-CM

## 2019-07-24 RX ORDER — CLOBETASOL PROPIONATE 0.5 MG/G
CREAM TOPICAL
Qty: 15 G | Refills: 1 | Status: CANCELLED | OUTPATIENT
Start: 2019-07-24

## 2019-07-25 RX ORDER — HALOBETASOL PROPIONATE 0.5 MG/G
CREAM TOPICAL
Qty: 50 G | Refills: 1 | Status: SHIPPED | OUTPATIENT
Start: 2019-07-25 | End: 2021-07-26

## 2019-07-25 RX ORDER — CLOBETASOL PROPIONATE 0.5 MG/G
CREAM TOPICAL
Qty: 30 G | Refills: 0 | OUTPATIENT
Start: 2019-07-25

## 2019-07-26 ENCOUNTER — MYC MEDICAL ADVICE (OUTPATIENT)
Dept: FAMILY MEDICINE | Facility: CLINIC | Age: 74
End: 2019-07-26

## 2019-07-26 NOTE — TELEPHONE ENCOUNTER
Pt requesting alternative to Clobetasol due to cost.   Looked into another options.   Could try Halobetasol- rx sent to pharmacy . Uncertain how it may compare cost wise. The tube is larger so may last longer.   Sent to pharmacy to see if it is more cost favorable. If not, pt may decline at the pharmacy.

## 2019-07-26 NOTE — TELEPHONE ENCOUNTER
See other encounter. Pt is going to try the other cream if it is too expensive- then will message us back for refill.  Estelle CLARKE RN

## 2019-07-28 ENCOUNTER — NURSE TRIAGE (OUTPATIENT)
Dept: NURSING | Facility: CLINIC | Age: 74
End: 2019-07-28

## 2019-07-28 NOTE — TELEPHONE ENCOUNTER
Delores from [facility] pharmacy is calling to verify how long the Ultravate 0.05% external cream is supposed to be used for. I agreed it is a 30 day supply.    Martha Tse RN/ [facility]

## 2019-08-07 DIAGNOSIS — I10 ESSENTIAL HYPERTENSION: ICD-10-CM

## 2019-08-07 RX ORDER — PROPRANOLOL HYDROCHLORIDE 40 MG/1
40 TABLET ORAL 2 TIMES DAILY
Qty: 60 TABLET | Refills: 0 | Status: SHIPPED | OUTPATIENT
Start: 2019-08-07 | End: 2019-09-07

## 2019-08-07 NOTE — TELEPHONE ENCOUNTER
Prescription approved per [facility] [place].    Sana Lomas RN   8/7/2019   3:34 PM

## 2019-08-07 NOTE — TELEPHONE ENCOUNTER
"Requested Prescriptions   Pending Prescriptions Disp Refills     propranolol (INDERAL) 40 MG tablet [Pharmacy Med Name: Propranolol HCl Oral Tablet 40 MG] 60 tablet 0     Sig: Take 1 tablet (40 mg) by mouth 2 times daily  Last Written Prescription Date:  06/12/2019  Last Fill Quantity: 60 tablet,  # refills: 1   Last office visit: 06/12/2019 :  Gela Bueno MD    Future Office Visit:           Beta-Blockers Protocol Passed - 8/7/2019  7:53 AM        Passed - Blood pressure under 140/90 in past 12 months     BP Readings from Last 3 Encounters:   07/08/19 136/66   06/24/19 132/60   06/12/19 132/78                 Passed - Patient is age 6 or older        Passed - Recent (12 mo) or future (30 days) visit within the authorizing provider's specialty     Patient had office visit in the last 12 months or has a visit in the next 30 days with authorizing provider or within the authorizing provider's specialty.  See \"Patient Info\" tab in inbasket, or \"Choose Columns\" in Meds & Orders section of the refill encounter.              Passed - Medication is active on med list          "

## 2019-09-07 ENCOUNTER — MYC REFILL (OUTPATIENT)
Dept: FAMILY MEDICINE | Facility: CLINIC | Age: 74
End: 2019-09-07

## 2019-09-07 DIAGNOSIS — I10 ESSENTIAL HYPERTENSION: ICD-10-CM

## 2019-09-10 RX ORDER — PROPRANOLOL HYDROCHLORIDE 40 MG/1
40 TABLET ORAL 2 TIMES DAILY
Qty: 180 TABLET | Refills: 2 | Status: SHIPPED | OUTPATIENT
Start: 2019-09-10 | End: 2020-05-29

## 2019-09-10 NOTE — TELEPHONE ENCOUNTER
"Office visit 6/12/19:  Elevated Blood Pressure     Resume Propranolol 40 mg  Twice per day.     Recheck BLOOD PRESSURE at  pharmacy or nurse visit in clinic after on medication 2-3 weeks;   Can then change Rx to 90 day supply.      BP on 7/8/19 done and within range. Will fill 90 day supply at this time.    Requested Prescriptions   Pending Prescriptions Disp Refills     propranolol (INDERAL) 40 MG tablet 60 tablet 0     Sig: Take 1 tablet (40 mg) by mouth 2 times daily       Beta-Blockers Protocol Passed - 9/7/2019  5:57 PM        Passed - Blood pressure under 140/90 in past 12 months     BP Readings from Last 3 Encounters:   07/08/19 136/66   06/24/19 132/60   06/12/19 132/78                 Passed - Patient is age 6 or older        Passed - Recent (12 mo) or future (30 days) visit within the authorizing provider's specialty     Patient had office visit in the last 12 months or has a visit in the next 30 days with authorizing provider or within the authorizing provider's specialty.  See \"Patient Info\" tab in inbasket, or \"Choose Columns\" in Meds & Orders section of the refill encounter.              Passed - Medication is active on med list        Ailyn Jewell RN Flex    "

## 2019-10-28 ENCOUNTER — OFFICE VISIT (OUTPATIENT)
Dept: FAMILY MEDICINE | Facility: CLINIC | Age: 74
End: 2019-10-28
Payer: COMMERCIAL

## 2019-10-28 ENCOUNTER — TELEPHONE (OUTPATIENT)
Dept: FAMILY MEDICINE | Facility: CLINIC | Age: 74
End: 2019-10-28

## 2019-10-28 VITALS
SYSTOLIC BLOOD PRESSURE: 148 MMHG | HEIGHT: 62 IN | WEIGHT: 165 LBS | DIASTOLIC BLOOD PRESSURE: 80 MMHG | BODY MASS INDEX: 30.36 KG/M2 | RESPIRATION RATE: 16 BRPM | TEMPERATURE: 98 F | HEART RATE: 66 BPM | OXYGEN SATURATION: 97 %

## 2019-10-28 DIAGNOSIS — N30.01 ACUTE CYSTITIS WITH HEMATURIA: Primary | ICD-10-CM

## 2019-10-28 DIAGNOSIS — R82.90 NONSPECIFIC FINDING ON EXAMINATION OF URINE: ICD-10-CM

## 2019-10-28 DIAGNOSIS — R82.90 ABNORMAL URINE ODOR: ICD-10-CM

## 2019-10-28 LAB
ALBUMIN UR-MCNC: 30 MG/DL
APPEARANCE UR: ABNORMAL
BACTERIA #/AREA URNS HPF: ABNORMAL /HPF
BILIRUB UR QL STRIP: NEGATIVE
COLOR UR AUTO: YELLOW
GLUCOSE UR STRIP-MCNC: NEGATIVE MG/DL
HGB UR QL STRIP: ABNORMAL
KETONES UR STRIP-MCNC: NEGATIVE MG/DL
LEUKOCYTE ESTERASE UR QL STRIP: ABNORMAL
NITRATE UR QL: NEGATIVE
PH UR STRIP: 5.5 PH (ref 5–7)
RBC #/AREA URNS AUTO: ABNORMAL /HPF
SOURCE: ABNORMAL
SP GR UR STRIP: <=1.005 (ref 1–1.03)
UROBILINOGEN UR STRIP-ACNC: 0.2 EU/DL (ref 0.2–1)
WBC #/AREA URNS AUTO: ABNORMAL /HPF

## 2019-10-28 PROCEDURE — 87186 SC STD MICRODIL/AGAR DIL: CPT | Performed by: NURSE PRACTITIONER

## 2019-10-28 PROCEDURE — 87086 URINE CULTURE/COLONY COUNT: CPT | Performed by: NURSE PRACTITIONER

## 2019-10-28 PROCEDURE — 81001 URINALYSIS AUTO W/SCOPE: CPT | Performed by: NURSE PRACTITIONER

## 2019-10-28 PROCEDURE — 87088 URINE BACTERIA CULTURE: CPT | Performed by: NURSE PRACTITIONER

## 2019-10-28 PROCEDURE — 99213 OFFICE O/P EST LOW 20 MIN: CPT | Performed by: NURSE PRACTITIONER

## 2019-10-28 RX ORDER — SULFAMETHOXAZOLE/TRIMETHOPRIM 800-160 MG
1 TABLET ORAL 2 TIMES DAILY
Qty: 14 TABLET | Refills: 0 | Status: SHIPPED | OUTPATIENT
Start: 2019-10-28 | End: 2019-12-16

## 2019-10-28 ASSESSMENT — MIFFLIN-ST. JEOR: SCORE: 1198.75

## 2019-10-28 NOTE — PROGRESS NOTES
"Subjective     Abby Casey is a 74 year old female who presents to clinic today for the following health issues:    HPI   URINARY TRACT SYMPTOMS      Duration: intermittent for the last month, has been constant Saturday and Sunday, not as bad yesterday    Description  odor    Intensity:  moderate    Accompanying signs and symptoms:  Fever/chills: YES- chills for 3 days last week and yesterday  Flank pain no   Nausea and vomiting: YES- nausea  Vaginal symptoms: none  Abdominal/Pelvic Pain: no   Also notes a cotton mouth feeling, mouth is very dry  Also having headaches around her whole head, thinks is from sinus concerns    History  History of frequent UTI's: no   History of kidney stones: no   Sexually Active: no   Possibility of pregnancy: No    Precipitating or alleviating factors: None    Therapies tried and outcome: increasing lots of fluids   Outcome: helped a bit with the odor      Patient notes she has been under extreme stress for several months  Thinks that her body is taking a physical toll  Was feeling very tired over the weekend and yesterday, felt weak, so she rested for most of the weekend  Has been drinking lots of water, so was urinating more frequently  Decreased appetite as well    Reviewed and updated as needed this visit by Provider  Tobacco  Allergies  Meds  Problems  Med Hx  Surg Hx  Fam Hx         Review of Systems   ROS COMP: Constitutional, HEENT, cardiovascular, pulmonary, gi and gu systems are negative, except as otherwise noted.      Objective    BP (!) 148/80 (BP Location: Right arm, Patient Position: Chair, Cuff Size: Adult Regular)   Pulse 66   Temp 98  F (36.7  C) (Oral)   Resp 16   Ht 1.562 m (5' 1.5\")   Wt 74.8 kg (165 lb)   LMP  (LMP Unknown)   SpO2 97%   Breastfeeding? No   BMI 30.67 kg/m    Body mass index is 30.67 kg/m .  Physical Exam  Vitals signs and nursing note reviewed.   Constitutional:       Appearance: Normal appearance. She is well-developed and " well-groomed.   Cardiovascular:      Rate and Rhythm: Normal rate and regular rhythm.      Heart sounds: Normal heart sounds.   Pulmonary:      Effort: Pulmonary effort is normal.      Breath sounds: Normal breath sounds and air entry.   Abdominal:      General: Bowel sounds are normal.      Palpations: Abdomen is soft.      Tenderness: There is no tenderness. There is no right CVA tenderness or left CVA tenderness.   Skin:     General: Skin is warm and dry.   Neurological:      Mental Status: She is alert and oriented to person, place, and time.   Psychiatric:         Behavior: Behavior is cooperative.            Diagnostic Test Results:  Results for orders placed or performed in visit on 10/28/19 (from the past 24 hour(s))   UA reflex to Microscopic and Culture   Result Value Ref Range    Color Urine Yellow     Appearance Urine Slightly Cloudy     Glucose Urine Negative NEG^Negative mg/dL    Bilirubin Urine Negative NEG^Negative    Ketones Urine Negative NEG^Negative mg/dL    Specific Gravity Urine <=1.005 1.003 - 1.035    Blood Urine Small (A) NEG^Negative    pH Urine 5.5 5.0 - 7.0 pH    Protein Albumin Urine 30 (A) NEG^Negative mg/dL    Urobilinogen Urine 0.2 0.2 - 1.0 EU/dL    Nitrite Urine Negative NEG^Negative    Leukocyte Esterase Urine Small (A) NEG^Negative    Source Midstream Urine    Urine Microscopic   Result Value Ref Range    WBC Urine  (A) OTO5^0 - 5 /HPF    RBC Urine 5-10 (A) OTO2^O - 2 /HPF    Bacteria Urine Few (A) NEG^Negative /HPF           Assessment & Plan     Abby was seen today for kidney problem and chills.    Diagnoses and all orders for this visit:    Acute cystitis with hematuria  -     sulfamethoxazole-trimethoprim (BACTRIM DS/SEPTRA DS) 800-160 MG tablet; Take 1 tablet by mouth 2 times daily for 7 days    Abnormal urine odor  -     UA reflex to Microscopic and Culture; Future  -     UA reflex to Microscopic and Culture  -     Urine Microscopic    Nonspecific finding on  "examination of urine  -     Urine Culture Aerobic Bacterial         Discussed with patient that UA does show an infection. Will start her on bactrim twice a day for 7 days. Discussed additional symptomatic treatment recommendations. Education was added to AVS. Patient was agreeable to plan and verbalized understanding.       Patient Instructions   Bactrim twice a day for 7 days  Push fluids  Tylenol and ibuprofen to help with pain      Patient Education     Bladder Infection, Female (Adult)    Urine is normally doesn't have any bacteria in it. But bacteria can get into the urinary tract from the skin around the rectum. Or they can travel in the blood from elsewhere in the body. Once they are in your urinary tract, they can cause infection in the urethra (urethritis), the bladder (cystitis), or the kidneys (pyelonephritis).  The most common place for an infection is in the bladder. This is called a bladder infection. This is one of the most common infections in women. Most bladder infections are easily treated. They are not serious unless the infection spreads to the kidney.  The phrases \"bladder infection,\" \"UTI,\" and \"cystitis\" are often used to describe the same thing. But they are not always the same. Cystitis is an inflammation of the bladder. The most common cause of cystitis is an infection.  Symptoms  The infection causes inflammation in the urethra and bladder. This causes many of the symptoms. The most common symptoms of a bladder infection are:    Pain or burning when urinating    Having to urinate more often than usual    Urgent need to urinate    Only a small amount of urine comes out    Blood in urine    Abdominal discomfort. This is usually in the lower abdomen above the pubic bone.    Cloudy urine    Strong- or bad-smelling urine    Unable to urinate (urinary retention)    Unable to hold urine in (urinary incontinence)    Fever    Loss of appetite    Confusion (in older adults)  Causes  Bladder " infections are not contagious. You can't get one from someone else, from a toilet seat, or from sharing a bath.  The most common cause of bladder infections is bacteria from the bowels. The bacteria get onto the skin around the opening of the urethra. From there, they can get into the urine and travel up to the bladder, causing inflammation and infection. This usually happens because of:    Wiping improperly after urinating. Always wipe from front to back.    Bowel incontinence    Pregnancy    Procedures such as having a catheter inserted    Older age    Not emptying your bladder. This can allow bacteria a chance to grow in your urine.    Dehydration    Constipation    Sex    Use of a diaphragm for birth control   Treatment  Bladder infections are diagnosed by a urine test. They are treated with antibiotics and usually clear up quickly without complications. Treatment helps prevent a more serious kidney infection.  Medicines  Medicines can help in the treatment of a bladder infection:    Take antibiotics until they are used up, even if you feel better. It is important to finish them to make sure the infection has cleared.    You can use acetaminophen or ibuprofen for pain, fever, or discomfort, unless another medicine was prescribed. If you have chronic liver or kidney disease, talk with your healthcare provider before using these medicines. Also talk with your provider if you've ever had a stomach ulcer or gastrointestinal bleeding, or are taking blood-thinner medicines.    If you are given phenazopydridine to reduce burning with urination, it will cause your urine to become a bright orange color. This can stain clothing.  Care and prevention  These self-care steps can help prevent future infections:    Drink plenty of fluids to prevent dehydration and flush out your bladder. Do this unless you must restrict fluids for other health reasons, or your doctor told you not to.    Proper cleaning after going to the  bathroom is important. Wipe from front to back after using the toilet to prevent the spread of bacteria.    Urinate more often. Don't try to hold urine in for a long time.    Wear loose-fitting clothes and cotton underwear. Avoid tight-fitting pants.    Improve your diet and prevent constipation. Eat more fresh fruit and vegetables, and fiber, and less junk and fatty foods.    Avoid sex until your symptoms are gone.    Avoid caffeine, alcohol, and spicy foods. These can irritate your bladder.    Urinate right after intercourse to flush out your bladder.    If you use birth control pills and have frequent bladder infections, discuss it with your doctor.  Follow-up care  Call your healthcare provider if all symptoms are not gone after 3 days of treatment. This is especially important if you have repeat infections.  If a culture was done, you will be told if your treatment needs to be changed. If directed, you can call to find out the results.  If X-rays were done, you will be told if the results will affect your treatment.  Call 911  Call 911 if any of the following occur:    Trouble breathing    Hard to wake up or confusion    Fainting or loss of consciousness    Rapid heart rate  When to seek medical advice  Call your healthcare provider right away if any of these occur:    Fever of 100.4 F (38.0 C) or higher, or as directed by your healthcare provider    Symptoms are not better by the third day of treatment    Back or belly (abdominal) pain that gets worse    Repeated vomiting, or unable to keep medicine down    Weakness or dizziness    Vaginal discharge    Pain, redness, or swelling in the outer vaginal area (labia)  Date Last Reviewed: 10/1/2016    9823-8693 [employer]. [address] [place] [address], [place] [address]. All rights reserved. This information is not intended as a substitute for professional medical care. Always follow your healthcare professional's instructions.               Return 3-5 days  if no improvement or sooner if symptoms worsen .    Celio Baires, CNP  [facility]

## 2019-10-28 NOTE — TELEPHONE ENCOUNTER
Called the pt back.    She has been under stress for months - nephew lost house, and job, was on meth.  She has been helping him out.      Last week she had chills.  She was nauseas and had a h/a.      Yesterday fatigued, nausea, urine smells.      She has pre-diabetes.      She thinks maybe the stress has pushed it past that point.      She drank a lot of water.      Advised to be seen today so we can check a urine as her urine has been smelling and she had the chills.

## 2019-10-28 NOTE — TELEPHONE ENCOUNTER
Reason for call:  Symptom   Symptom or request: diabetic concerns    Duration (how long have symptoms been present): a week  Have you been treated for this before? Yes    Additional comments: This came from a [facility] request.     Phone number to reach patient:  Home number on file [phone] (home)    Best Time:  any    Can we leave a detailed message on this number?  YES

## 2019-10-28 NOTE — TELEPHONE ENCOUNTER
Spoke to Dr. Bueno about this.  She advised it is good she is getting in today to check on her urine and get assessed.  Pt can then f/u with Dr. Bueno if needed.

## 2019-10-28 NOTE — PATIENT INSTRUCTIONS
"Bactrim twice a day for 7 days  Push fluids  Tylenol and ibuprofen to help with pain      Patient Education     Bladder Infection, Female (Adult)    Urine is normally doesn't have any bacteria in it. But bacteria can get into the urinary tract from the skin around the rectum. Or they can travel in the blood from elsewhere in the body. Once they are in your urinary tract, they can cause infection in the urethra (urethritis), the bladder (cystitis), or the kidneys (pyelonephritis).  The most common place for an infection is in the bladder. This is called a bladder infection. This is one of the most common infections in women. Most bladder infections are easily treated. They are not serious unless the infection spreads to the kidney.  The phrases \"bladder infection,\" \"UTI,\" and \"cystitis\" are often used to describe the same thing. But they are not always the same. Cystitis is an inflammation of the bladder. The most common cause of cystitis is an infection.  Symptoms  The infection causes inflammation in the urethra and bladder. This causes many of the symptoms. The most common symptoms of a bladder infection are:    Pain or burning when urinating    Having to urinate more often than usual    Urgent need to urinate    Only a small amount of urine comes out    Blood in urine    Abdominal discomfort. This is usually in the lower abdomen above the pubic bone.    Cloudy urine    Strong- or bad-smelling urine    Unable to urinate (urinary retention)    Unable to hold urine in (urinary incontinence)    Fever    Loss of appetite    Confusion (in older adults)  Causes  Bladder infections are not contagious. You can't get one from someone else, from a toilet seat, or from sharing a bath.  The most common cause of bladder infections is bacteria from the bowels. The bacteria get onto the skin around the opening of the urethra. From there, they can get into the urine and travel up to the bladder, causing inflammation and " infection. This usually happens because of:    Wiping improperly after urinating. Always wipe from front to back.    Bowel incontinence    Pregnancy    Procedures such as having a catheter inserted    Older age    Not emptying your bladder. This can allow bacteria a chance to grow in your urine.    Dehydration    Constipation    Sex    Use of a diaphragm for birth control   Treatment  Bladder infections are diagnosed by a urine test. They are treated with antibiotics and usually clear up quickly without complications. Treatment helps prevent a more serious kidney infection.  Medicines  Medicines can help in the treatment of a bladder infection:    Take antibiotics until they are used up, even if you feel better. It is important to finish them to make sure the infection has cleared.    You can use acetaminophen or ibuprofen for pain, fever, or discomfort, unless another medicine was prescribed. If you have chronic liver or kidney disease, talk with your healthcare provider before using these medicines. Also talk with your provider if you've ever had a stomach ulcer or gastrointestinal bleeding, or are taking blood-thinner medicines.    If you are given phenazopydridine to reduce burning with urination, it will cause your urine to become a bright orange color. This can stain clothing.  Care and prevention  These self-care steps can help prevent future infections:    Drink plenty of fluids to prevent dehydration and flush out your bladder. Do this unless you must restrict fluids for other health reasons, or your doctor told you not to.    Proper cleaning after going to the bathroom is important. Wipe from front to back after using the toilet to prevent the spread of bacteria.    Urinate more often. Don't try to hold urine in for a long time.    Wear loose-fitting clothes and cotton underwear. Avoid tight-fitting pants.    Improve your diet and prevent constipation. Eat more fresh fruit and vegetables, and fiber, and  less junk and fatty foods.    Avoid sex until your symptoms are gone.    Avoid caffeine, alcohol, and spicy foods. These can irritate your bladder.    Urinate right after intercourse to flush out your bladder.    If you use birth control pills and have frequent bladder infections, discuss it with your doctor.  Follow-up care  Call your healthcare provider if all symptoms are not gone after 3 days of treatment. This is especially important if you have repeat infections.  If a culture was done, you will be told if your treatment needs to be changed. If directed, you can call to find out the results.  If X-rays were done, you will be told if the results will affect your treatment.  Call 911  Call 911 if any of the following occur:    Trouble breathing    Hard to wake up or confusion    Fainting or loss of consciousness    Rapid heart rate  When to seek medical advice  Call your healthcare provider right away if any of these occur:    Fever of 100.4 F (38.0 C) or higher, or as directed by your healthcare provider    Symptoms are not better by the third day of treatment    Back or belly (abdominal) pain that gets worse    Repeated vomiting, or unable to keep medicine down    Weakness or dizziness    Vaginal discharge    Pain, redness, or swelling in the outer vaginal area (labia)  Date Last Reviewed: 10/1/2016    6794-0722 [employer]. [address] [place] [address], [place] [address]. All rights reserved. This information is not intended as a substitute for professional medical care. Always follow your healthcare professional's instructions.

## 2019-10-29 LAB
BACTERIA SPEC CULT: ABNORMAL
SPECIMEN SOURCE: ABNORMAL

## 2019-11-19 ENCOUNTER — ALLIED HEALTH/NURSE VISIT (OUTPATIENT)
Dept: INTERNAL MEDICINE | Facility: CLINIC | Age: 74
End: 2019-11-19
Payer: COMMERCIAL

## 2019-11-19 VITALS — SYSTOLIC BLOOD PRESSURE: 139 MMHG | DIASTOLIC BLOOD PRESSURE: 76 MMHG

## 2019-11-19 DIAGNOSIS — Z01.30 BP CHECK: Primary | ICD-10-CM

## 2019-11-19 PROCEDURE — 99207 ZZC NO CHARGE NURSE ONLY: CPT | Performed by: INTERNAL MEDICINE

## 2019-11-19 NOTE — PROGRESS NOTES
Abby Casey was evaluated at [facility] on November 19, 2019 at which time her blood pressure was:    BP Readings from Last 3 Encounters:   11/19/19 139/76   10/28/19 (!) 148/80   07/08/19 136/66     Pulse Readings from Last 3 Encounters:   10/28/19 66   06/12/19 71   05/02/19 80     Patient states high reading from last October potentially due to UTI, feeling like she may have another one (urinary frequency, smell, pain). She is going to call clinic for appointment.     Recommendation to Provider: None    Note completed by: Nora Negrete, PharmD  [facility]  [phone]

## 2019-12-16 ENCOUNTER — OFFICE VISIT (OUTPATIENT)
Dept: FAMILY MEDICINE | Facility: CLINIC | Age: 74
End: 2019-12-16
Payer: COMMERCIAL

## 2019-12-16 VITALS
HEART RATE: 52 BPM | BODY MASS INDEX: 30.55 KG/M2 | DIASTOLIC BLOOD PRESSURE: 80 MMHG | OXYGEN SATURATION: 98 % | TEMPERATURE: 97.8 F | RESPIRATION RATE: 16 BRPM | SYSTOLIC BLOOD PRESSURE: 144 MMHG | WEIGHT: 166 LBS | HEIGHT: 62 IN

## 2019-12-16 DIAGNOSIS — N30.00 ACUTE CYSTITIS WITHOUT HEMATURIA: Primary | ICD-10-CM

## 2019-12-16 LAB
ALBUMIN UR-MCNC: NEGATIVE MG/DL
APPEARANCE UR: CLEAR
BILIRUB UR QL STRIP: NEGATIVE
COLOR UR AUTO: YELLOW
GLUCOSE UR STRIP-MCNC: NEGATIVE MG/DL
HGB UR QL STRIP: NEGATIVE
KETONES UR STRIP-MCNC: NEGATIVE MG/DL
LEUKOCYTE ESTERASE UR QL STRIP: NEGATIVE
NITRATE UR QL: NEGATIVE
PH UR STRIP: 5.5 PH (ref 5–7)
SOURCE: NORMAL
SP GR UR STRIP: <=1.005 (ref 1–1.03)
UROBILINOGEN UR STRIP-ACNC: 0.2 EU/DL (ref 0.2–1)

## 2019-12-16 PROCEDURE — 99213 OFFICE O/P EST LOW 20 MIN: CPT | Performed by: FAMILY MEDICINE

## 2019-12-16 PROCEDURE — 81003 URINALYSIS AUTO W/O SCOPE: CPT | Performed by: FAMILY MEDICINE

## 2019-12-16 RX ORDER — SULFAMETHOXAZOLE/TRIMETHOPRIM 800-160 MG
1 TABLET ORAL 2 TIMES DAILY
Qty: 14 TABLET | Refills: 0 | Status: SHIPPED | OUTPATIENT
Start: 2019-12-16 | End: 2020-02-27

## 2019-12-16 ASSESSMENT — MIFFLIN-ST. JEOR: SCORE: 1203.28

## 2019-12-16 NOTE — PROGRESS NOTES
Subjective     Abby Casey is a 74 year old female who presents to clinic today for the following health issues:    HPI   URINARY TRACT SYMPTOMS      Duration: started last week    Description  Odor, especially in the morning    Intensity:  moderate    Accompanying signs and symptoms:  Fever/chills: no   Flank pain no   Nausea and vomiting: no   Vaginal symptoms: none  Abdominal/Pelvic Pain: no     History  History of frequent UTI's: no, but had last UTI 10/28/19; given Bactrim which she thinks helped  History of kidney stones: no   Sexually Active: no   Possibility of pregnancy: No    Precipitating or alleviating factors: increasing fluids helps a little bit    Therapies tried and outcome: increase fluid intake   Outcome: helped with odor      Acute cystitis without hematuria :concerned urine smell could be related to diabetse (read on line).     No vaginal symptoms.      No fevers/cva tenderness but last time had uti developed fevers/flank pain and initial symptom was change in smell.           Problem list, Medication list, Allergies, and Medical/Social/Surgical histories reviewed in [facility] and updated as appropriate.  Labs reviewed in EPIC  BP Readings from Last 3 Encounters:   12/16/19 (!) 142/80   11/19/19 139/76   10/28/19 (!) 148/80    Wt Readings from Last 3 Encounters:   12/16/19 75.3 kg (166 lb)   10/28/19 74.8 kg (165 lb)   06/12/19 73.9 kg (163 lb)                  Patient Active Problem List   Diagnosis     Hypothyroidism     Abnormal blood chemistry     Disorder of bone and cartilage     Diverticulosis of large intestine     Lichen sclerosus et atrophicus of the vulva     HYPERLIPIDEMIA LDL GOAL <130     Elevated glucose     Advanced directives, counseling/discussion     Papanicolaou smear of cervix with low grade squamous intraepithelial lesion (LGSIL)     Acute pain of right shoulder     Right rotator cuff tear     Rupture of right proximal biceps tendon     Tendinopathy of right rotator cuff      Osteoarthritis of right acromioclavicular joint     Adjustment disorder with anxious mood     Obesity     Past Surgical History:   Procedure Laterality Date     C NONSPECIFIC PROCEDURE  10/99    Colposcopy--reactive atypia         COLONOSCOPY       and      COLONOSCOPY N/A 2016    Procedure: COLONOSCOPY;  Surgeon: Adriano Roberto MD, MD;  Location: [facility]     DILATION AND CURETTAGE, OPERATIVE HYSTEROSCOPY WITH MORCELLATOR, COMBINED N/A 2017    endometrial polypectomy for PMB     DILATION AND CURETTAGE, OPERATIVE HYSTEROSCOPY WITH MORCELLATOR, COMBINED N/A 2017    Procedure: COMBINED DILATION AND CURETTAGE, OPERATIVE HYSTEROSCOPY WITH MORCELLATOR;   DILATION AND CURETTAGE, OPERATIVE HYSTEROSCOPY WITH MORCELLATOR;  Surgeon: Negra Olivia MD;  Location: [facility] OR     EYE SURGERY      Bilateral cataract extraction     HC REMOVAL GALLBLADDER      Ngozi       HYSTERECTOMY       TUBAL LIGATION         Social History     Tobacco Use     Smoking status: Never Smoker     Smokeless tobacco: Never Used   Substance Use Topics     Alcohol use: Yes     Comment: 3 drinks per month     Family History   Problem Relation Age of Onset     Heart Disease Father          @77     Cancer Father         Liver Cancer, Eye  Cancer     Diabetes Brother      Diabetes Brother      Diabetes Sister      Colon Cancer No family hx of          Current Outpatient Medications   Medication Sig Dispense Refill     ASPIRIN 81 MG OR TABS ONE DAILY 100 3     CALCIUM + D 600-200 MG-UNIT OR TABS 1 TABLET DAILY       Chromium-Cinnamon 100-500 MCG-MG CAPS Take 1 tablet by mouth 2 times daily       guaiFENesin (MUCINEX) 600 MG 12 hr tablet Take 2 tablets by mouth 2 times daily as needed for congestion. 40 tablet 0     halobetasol (ULTRAVATE) 0.05 % external cream Use at bedtime weekly for maintenance of Lichen sclerosis. Pt requesting alternative to Clobetasol due to cost. 50 g 1     levothyroxine (SYNTHROID/LEVOTHROID) 112  "MCG tablet Take 1 tablet (112 mcg) by mouth daily 90 tablet 3     MULTIVITAMIN OR 1 daily       NAPROXEN PO Take 220 mg by mouth 2 times daily as needed for moderate pain       propranolol (INDERAL) 40 MG tablet Take 1 tablet (40 mg) by mouth 2 times daily 180 tablet 2     PROSHIELD PLUS SKIN PROTECTANT 1 % EX CREA use as needed  up to QID 1 tube 1     simvastatin (ZOCOR) 40 MG tablet Take 1 tablet (40 mg) by mouth At Bedtime 90 tablet 3     sulfamethoxazole-trimethoprim (BACTRIM DS/SEPTRA DS) 800-160 MG tablet Take 1 tablet by mouth 2 times daily for 7 days 14 tablet 0     Allergies   Allergen Reactions     Neurontin [Gabapentin] Visual Disturbance     Amoxicillin Hives     Clindamycin Hcl Diarrhea     Recent Labs   Lab Test 04/25/19  0815 04/24/18  0820 10/19/17  0824  04/17/17  0827   A1C 5.8* 5.9* 5.6  --  5.9   LDL 94 81  --   --  92   HDL 46* 43*  --   --  44*   TRIG 141 133  --   --  167*   ALT 25 26  --   --  22   CR 0.69 0.59  --   --  0.61   GFRESTIMATED 86 >90  --   --  >90  Non  GFR Calc     GFRESTBLACK >90 >90  --   --  >90  African American GFR Calc     POTASSIUM 4.5 4.0  --   --  4.7   TSH 0.68 0.67 0.42   < > 0.12*    < > = values in this interval not displayed.        ROS:  Constitutional, HEENT, cardiovascular, pulmonary, GI, , musculoskeletal, neuro, skin, endocrine and psych systems are negative, except as otherwise noted.        OBJECTIVE:  BP (!) 142/80 (BP Location: Right arm, Patient Position: Chair, Cuff Size: Adult Regular)   Pulse 52   Temp 97.8  F (36.6  C) (Oral)   Resp 16   Ht 1.562 m (5' 1.5\")   Wt 75.3 kg (166 lb)   LMP  (LMP Unknown)   SpO2 98%   Breastfeeding No   BMI 30.86 kg/m      EXAM:  GENERAL APPEARANCE: healthy, alert and no distress      Results for orders placed or performed in visit on 12/16/19   UA reflex to Microscopic and Culture     Status: None   Result Value Ref Range    Color Urine Yellow     Appearance Urine Clear     Glucose Urine " "Negative NEG^Negative mg/dL    Bilirubin Urine Negative NEG^Negative    Ketones Urine Negative NEG^Negative mg/dL    Specific Gravity Urine <=1.005 1.003 - 1.035    Blood Urine Negative NEG^Negative    pH Urine 5.5 5.0 - 7.0 pH    Protein Albumin Urine Negative NEG^Negative mg/dL    Urobilinogen Urine 0.2 0.2 - 1.0 EU/dL    Nitrite Urine Negative NEG^Negative    Leukocyte Esterase Urine Negative NEG^Negative    Source Midstream Urine          ASSESSMENT AND PLAN  Patient Instructions   ASSESSMENT AND PLAN  1. Acute cystitis without hematuria  Based on same symptoms as last time when it turned into kidney infection so elected to treat despite normal looking urine test.     Await urine culture.  If negative next time may not treat so early.     If worsening/fevers then follow up.     Take 5 days for sure and if not better then full seven.     Reassuring no glucose/ketones in urine and last a1c was normal six months ago so unlikely to be related to diabetes.     Re-check blood pressue, if high then follow up with primary provider.       - sulfamethoxazole-trimethoprim (BACTRIM DS/SEPTRA DS) 800-160 MG tablet; Take 1 tablet by mouth 2 times daily for 7 days  Dispense: 14 tablet; Refill: 0    2.  Elevated blood pressue/anxiety:  Did re-start propranolol, has been doing blood pressue checks at [facility].         [employer] SIGN UP: [email or web address] , 1-325.881.9779    E-VISITS CAN BE DONE FOR CARE/PRESCRIPTIONS WHICH MAY NOT NEED AN IN-PERSON ASSESSMENT - click \"on-line care, then request e-visit\".      ONCARE VISIT/PRESCRIPTIONS: [email or web address]  - we treat nearly 50 common conditions with one hour response time     RADIOLOGY SCHEDULING  [facility]:  [phone]   [facility]: [phone]  [facility]: [phone]    Mammogram and Colonoscopy [phone]    Smoking Cessation: www.quitplan.org, 6-231-810-PLAN (8227)    Pharmacy savings card application: www.[employer]    CONSUMER " PRICE LINE for estimates of test costs:  [phone]             Joel Wegener, MD

## 2019-12-16 NOTE — PATIENT INSTRUCTIONS
"ASSESSMENT AND PLAN  1. Acute cystitis without hematuria  Based on same symptoms as last time when it turned into kidney infection so elected to treat despite normal looking urine test.     Await urine culture.  If negative next time may not treat so early.     If worsening/fevers then follow up.     Take 5 days for sure and if not better then full seven.     Reassuring no glucose/ketones in urine and last a1c was normal six months ago so unlikely to be related to diabetes.     Re-check blood pressue, if high then follow up with primary provider.       - sulfamethoxazole-trimethoprim (BACTRIM DS/SEPTRA DS) 800-160 MG tablet; Take 1 tablet by mouth 2 times daily for 7 days  Dispense: 14 tablet; Refill: 0        MYCHART SIGN UP: http://[email or web address] , 1-548.739.9412    E-VISITS CAN BE DONE FOR CARE/PRESCRIPTIONS WHICH MAY NOT NEED AN IN-PERSON ASSESSMENT - click \"on-line care, then request e-visit\".      ONCARE VISIT/PRESCRIPTIONS: [email or web address]  - we treat nearly 50 common conditions with one hour response time     RADIOLOGY SCHEDULING  [facility]:  [phone]   [facility]: [phone]  [facility]: [phone]    Mammogram and Colonoscopy [phone]    Smoking Cessation: www.quitplan.org, 1-176-865-PLAN (0238)    Pharmacy savings card application: www.[employer]    CONSUMER PRICE LINE for estimates of test costs:  [phone]       "

## 2020-01-28 ENCOUNTER — ALLIED HEALTH/NURSE VISIT (OUTPATIENT)
Dept: FAMILY MEDICINE | Facility: CLINIC | Age: 75
End: 2020-01-28
Payer: COMMERCIAL

## 2020-01-28 VITALS — DIASTOLIC BLOOD PRESSURE: 66 MMHG | SYSTOLIC BLOOD PRESSURE: 144 MMHG

## 2020-01-28 DIAGNOSIS — I10 ESSENTIAL HYPERTENSION: Primary | ICD-10-CM

## 2020-01-28 PROCEDURE — 99207 ZZC NO CHARGE NURSE ONLY: CPT | Performed by: INTERNAL MEDICINE

## 2020-01-28 NOTE — PROGRESS NOTES
Abby Casey was evaluated at [facility] on January 28, 2020 at which time her blood pressure was:    BP Readings from Last 3 Encounters:   01/28/20 (!) 144/66   12/16/19 (!) 144/80   11/19/19 139/76     Pulse Readings from Last 3 Encounters:   12/16/19 52   10/28/19 66   06/12/19 71       Reviewed lifestyle modifications for blood pressure control and reduction: including making healthy food choices, managing weight, getting regular exercise, smoking cessation, reducing alcohol consumption, monitoring blood pressure regularly.     Symptoms: Other: Dizziness while in bed watching TV at night    BP Goal:< 140/90 mmHg    BP Assessment:  BP too high    Potential Reasons for BP too high: Dose of BP medication too low    BP Follow-Up Plan: Referral to PCP    Recommendation to Provider: Initiate ACE inhibitor like lisinopril 10 mg daily and have patient recheck BP in 30 days.    Note completed by: Lee Matamoros, PharmD  [facility]  [phone]

## 2020-02-26 NOTE — PROGRESS NOTES
MTM ENCOUNTER  SUBJECTIVE/OBJECTIVE:                           Abby Casey is a 74 year old female coming in for an initial visit. She was referred to me from Dr. Bueno.      Chief Complaint: Referred for blood pressure.    Allergies/ADRs: Reviewed in Epic  Tobacco:  reports that she has never smoked. She has never used smokeless tobacco.  Alcohol: Less than 1 beverage / month  Caffeine: 1 cups/day of coffee  Activity: minimal, used to walk 3x/week with friend but no longer  PMH: Reviewed in Epic    Medication Adherence/Access: no issues reported    Hypertension/Migraine Prevention: Current medications include propranolol 40mg BID.  Patient does not self-monitor BP.  Patient reports no current medication side effects. If necessary patient is open to switching medications rather than adding a new one, but is worried about potential side effects. She has not had any migraines and feels that propranolol is not necessary for prevention. All migraines stopped after menopause.   BP Readings from Last 3 Encounters:   02/27/20 119/64   01/28/20 (!) 144/66   12/16/19 (!) 144/80     Last Comprehensive Metabolic Panel:  Sodium   Date Value Ref Range Status   04/25/2019 141 133 - 144 mmol/L Final     Potassium   Date Value Ref Range Status   04/25/2019 4.5 3.4 - 5.3 mmol/L Final     Chloride   Date Value Ref Range Status   04/25/2019 107 94 - 109 mmol/L Final     Carbon Dioxide   Date Value Ref Range Status   04/25/2019 28 20 - 32 mmol/L Final     Anion Gap   Date Value Ref Range Status   04/25/2019 6 3 - 14 mmol/L Final     Glucose   Date Value Ref Range Status   04/25/2019 118 (H) 70 - 99 mg/dL Final     Comment:     Fasting specimen     Urea Nitrogen   Date Value Ref Range Status   04/25/2019 19 7 - 30 mg/dL Final     Creatinine   Date Value Ref Range Status   04/25/2019 0.69 0.52 - 1.04 mg/dL Final     GFR Estimate   Date Value Ref Range Status   04/25/2019 86 >60 mL/min/[1.73_m2] Final     Comment:     Non   "American GFR Calc  Starting 12/18/2018, serum creatinine based estimated GFR (eGFR) will be   calculated using the Chronic Kidney Disease Epidemiology Collaboration   (CKD-EPI) equation.       Calcium   Date Value Ref Range Status   04/25/2019 9.1 8.5 - 10.1 mg/dL Final     Hyperlipidemia/Stroke prevention: Current therapy includes simvastatin 40mg once daily and aspirin 81mg daily.  Pt reports no significant myalgias or other side effects. Previously tried Vytorin, pravastatin, rosuvastatin but had myalgias.   The 10-year ASCVD risk score (Papojenny GIBSON Jr., et al., 2013) is: 20.9%    Values used to calculate the score:      Age: 74 years      Sex: Female      Is Non- : No      Diabetic: No      Tobacco smoker: No      Systolic Blood Pressure: 144 mmHg      Is BP treated: Yes      HDL Cholesterol: 46 mg/dL      Total Cholesterol: 168 mg/dL  Recent Labs   Lab Test 04/25/19  0815 04/24/18  0820  04/16/15  0835 10/15/14  0801   CHOL 168 151   < > 164 159   HDL 46* 43*   < > 47* 45*   LDL 94 81   < > 94 80   TRIG 141 133   < > 115 172*   CHOLHDLRATIO  --   --   --  3.5 3.5    < > = values in this interval not displayed.     Hypothyroidism: Patient is taking levothyroxine 112 mcg daily. Patient is having the following symptoms: none.   TSH   Date Value Ref Range Status   04/25/2019 0.68 0.40 - 4.00 mU/L Final     Lichen Sclerosis: Pt using halobetasol 0.05% cream PRN and reports no side effects.    Supplements: Pt taking MVI daily, calcium-vitamin D daily, chromium/cinnamin daily for \"blood sugars\", and turmeric pill once daily. Patient reports no side effects.    Congestion: Uses guaifenesin 600mg PRN (uses occasionally) and reports no issues.     Pain: Pt taking naproxen 2 pills per day as needed. Patient states she does not use often and that acetaminophen has not been effective .    Today's Vitals: /64   Pulse (!) 49   Wt 167 lb 11.2 oz (76.1 kg)   LMP  (LMP Unknown)   BMI 31.17 kg/m  "       ASSESSMENT:                            Medication Adherence: good, no issues identified    Hypertension/Migraine Prevention: Controlled. Patient's pulse was low at 49 beams/min. This may be attributed to her current dose of propranolol. It would be recommended to have the patient monitor for symptoms of extreme fatigue or dizziness to determine if she should continue on this dose.     Hyperlipidemia/Stroke prevention: Needs improvement. Based on the 2019 ACC/AHA guideline on primary prevention of cardiovascular disease, aspirin is not indicated for this patient as she has had no previous cardiovascular events and is over the age of 71. Daily aspirin is not recommended as the benefits do not outweigh the risk for events like GI bleeds or falls. Recommend discontinuing aspirin 81 mg daily.     Hypothyroidism: Controlled. No drug therapy problems at this time.     Lichen Sclerosis: Controlled. No drug therapy problems at this time.    Supplements: Controlled. No drug therapy problems at this time.    Congestion: Controlled. No drug therapy problems at this time.    Pain: Controlled. No drug therapy problems at this time.    PLAN:                            1. Stop aspirin 81 mg daily.    I spent 25 minutes with this patient today. All changes were made via collaborative practice agreement with Gela Bueno. A copy of the visit note was provided to the patient's primary care provider.    Will follow up in 1 year for annual medication review unless indicated.    The patient was given a summary of these recommendations.     Estelle Jewell, PD4 student    Dorinda Bergeron, PharmD  Medication Therapy Management Provider, [facility]  Pager: [phone]

## 2020-02-27 ENCOUNTER — OFFICE VISIT (OUTPATIENT)
Dept: PHARMACY | Facility: CLINIC | Age: 75
End: 2020-02-27
Payer: COMMERCIAL

## 2020-02-27 VITALS
WEIGHT: 167.7 LBS | HEART RATE: 49 BPM | DIASTOLIC BLOOD PRESSURE: 64 MMHG | BODY MASS INDEX: 31.17 KG/M2 | SYSTOLIC BLOOD PRESSURE: 119 MMHG

## 2020-02-27 DIAGNOSIS — E78.5 HYPERLIPIDEMIA LDL GOAL <100: ICD-10-CM

## 2020-02-27 DIAGNOSIS — E03.9 HYPOTHYROIDISM, UNSPECIFIED TYPE: ICD-10-CM

## 2020-02-27 DIAGNOSIS — N90.4 LICHEN SCLEROSUS ET ATROPHICUS OF THE VULVA: ICD-10-CM

## 2020-02-27 DIAGNOSIS — I10 ESSENTIAL HYPERTENSION: Primary | ICD-10-CM

## 2020-02-27 DIAGNOSIS — Z78.9 TAKES DIETARY SUPPLEMENTS: ICD-10-CM

## 2020-02-27 DIAGNOSIS — R52 PAIN: ICD-10-CM

## 2020-02-27 DIAGNOSIS — G43.909 MIGRAINE WITHOUT STATUS MIGRAINOSUS, NOT INTRACTABLE, UNSPECIFIED MIGRAINE TYPE: ICD-10-CM

## 2020-02-27 DIAGNOSIS — R09.81 NASAL CONGESTION: ICD-10-CM

## 2020-02-27 PROCEDURE — 99607 MTMS BY PHARM ADDL 15 MIN: CPT | Performed by: PHARMACIST

## 2020-02-27 PROCEDURE — 99605 MTMS BY PHARM NP 15 MIN: CPT | Performed by: PHARMACIST

## 2020-02-27 NOTE — PATIENT INSTRUCTIONS
Recommendations from today's MTM visit:                                                    MTM (medication therapy management) is a service provided by a clinical pharmacist designed to help you get the most of out of your medicines.   Today we reviewed what your medicines are for, how to know if they are working, that your medicines are safe and how to make your medicine regimen as easy as possible.     1. Stop aspirin.     It was great to speak with you today.  I value your experience and would be very thankful for your time with providing feedback on our clinic survey. You may receive a survey via email or text message in the next few days.     To schedule another MTM appointment, please call the clinic directly or you may call the MTM scheduling line at [phone] or toll-free at [phone].     My Clinical Pharmacist's contact information:                                                      It was a pleasure talking with you today!  Please feel free to contact me with any questions or concerns you have.      Dorinda Bergeron, PharmD  Medication Therapy Management Provider, [facility]  Pager: [phone]

## 2020-02-28 ENCOUNTER — TELEPHONE (OUTPATIENT)
Dept: FAMILY MEDICINE | Facility: CLINIC | Age: 75
End: 2020-02-28

## 2020-02-28 NOTE — TELEPHONE ENCOUNTER
Pt wonders about propranolol and her hx of pulse with recent months her pulse continuing to drop. Discussed with her that this is d/t her restarting propranolol and we'll have to recheck her pulse again at next visit as historically 3 years ago, her pulses were consistently in the 50's.    She also wonders about risk of vision changes with propranolol. She's noticed since restarting, she's had some vision changes and will be seeing her ophthalmologist next month. Informed her that vision changes, dry eyes, and mydriasis is possible from propranolol but very rare. She will see if anything is found during eye exam and if it doesn't improve, she will make an appointment to switch propranolol to alternative BP medication to see if it improves her vision again. If propranolol is causing vision changes, discontinuation of therapy should reverse any of these side effects. If switching therapy, would recommend switch to ACEi or thiazide diuretic as other beta blockers can also cause vision changes.     Dorinda Bergeron, PharmD  Medication Therapy Management Provider, [facility]  Pager: [phone]

## 2020-02-28 NOTE — TELEPHONE ENCOUNTER
Reason for call:  Other   Patient called regarding (reason for call): call back  Additional comments: would like to discuss her bp and other issues she forgot to mention on 02/27/20    Phone number to reach patient:  Home number on file [phone] (home)    Best Time:  any    Can we leave a detailed message on this number?  YES    Travel screening: Not Applicable

## 2020-05-29 DIAGNOSIS — I10 ESSENTIAL HYPERTENSION: ICD-10-CM

## 2020-05-29 RX ORDER — PROPRANOLOL HYDROCHLORIDE 40 MG/1
TABLET ORAL
Qty: 180 TABLET | Refills: 0 | Status: SHIPPED | OUTPATIENT
Start: 2020-05-29 | End: 2020-07-22

## 2020-05-29 NOTE — TELEPHONE ENCOUNTER
Prescription approved per [facility] [place].    Jacqueline Munoz RN, BSN  [facility]

## 2020-06-03 ENCOUNTER — TELEPHONE (OUTPATIENT)
Dept: FAMILY MEDICINE | Facility: CLINIC | Age: 75
End: 2020-06-03

## 2020-06-03 DIAGNOSIS — E03.9 HYPOTHYROIDISM: ICD-10-CM

## 2020-06-03 DIAGNOSIS — R73.09 ABNORMAL GLUCOSE: ICD-10-CM

## 2020-06-03 DIAGNOSIS — E78.5 HYPERLIPIDEMIA LDL GOAL <130: Primary | ICD-10-CM

## 2020-06-03 NOTE — TELEPHONE ENCOUNTER
Reason for call:  Order   Order or referral being requested: labs  Reason for request: upcoming wellness visit with Dr Bueno on July 22.  Date needed: at your convenience  Has the patient been seen by the PCP for this problem? NO    Additional comments: Pt would like to get her labs done prior to her physical  Please let her know.     Phone number to reach patient:  Home number on file [phone] (home)    Best Time:  any    Can we leave a detailed message on this number?  YES    Travel screening: Not Applicable

## 2020-07-15 DIAGNOSIS — R73.09 ABNORMAL GLUCOSE: ICD-10-CM

## 2020-07-15 DIAGNOSIS — E78.5 HYPERLIPIDEMIA LDL GOAL <130: ICD-10-CM

## 2020-07-15 DIAGNOSIS — E03.9 HYPOTHYROIDISM: ICD-10-CM

## 2020-07-15 LAB
ALBUMIN SERPL-MCNC: 3.7 G/DL (ref 3.4–5)
ALP SERPL-CCNC: 78 U/L (ref 40–150)
ALT SERPL W P-5'-P-CCNC: 22 U/L (ref 0–50)
ANION GAP SERPL CALCULATED.3IONS-SCNC: 6 MMOL/L (ref 3–14)
AST SERPL W P-5'-P-CCNC: 19 U/L (ref 0–45)
BILIRUB SERPL-MCNC: 0.4 MG/DL (ref 0.2–1.3)
BUN SERPL-MCNC: 17 MG/DL (ref 7–30)
CALCIUM SERPL-MCNC: 9 MG/DL (ref 8.5–10.1)
CHLORIDE SERPL-SCNC: 111 MMOL/L (ref 94–109)
CHOLEST SERPL-MCNC: 145 MG/DL
CO2 SERPL-SCNC: 25 MMOL/L (ref 20–32)
CREAT SERPL-MCNC: 0.75 MG/DL (ref 0.52–1.04)
GFR SERPL CREATININE-BSD FRML MDRD: 78 ML/MIN/{1.73_M2}
GLUCOSE SERPL-MCNC: 126 MG/DL (ref 70–99)
HBA1C MFR BLD: 5.9 % (ref 0–5.6)
HDLC SERPL-MCNC: 43 MG/DL
LDLC SERPL CALC-MCNC: 76 MG/DL
NONHDLC SERPL-MCNC: 102 MG/DL
POTASSIUM SERPL-SCNC: 5.1 MMOL/L (ref 3.4–5.3)
PROT SERPL-MCNC: 7 G/DL (ref 6.8–8.8)
SODIUM SERPL-SCNC: 142 MMOL/L (ref 133–144)
T4 FREE SERPL-MCNC: 1.56 NG/DL (ref 0.76–1.46)
TRIGL SERPL-MCNC: 132 MG/DL
TSH SERPL DL<=0.005 MIU/L-ACNC: 0.32 MU/L (ref 0.4–4)

## 2020-07-15 PROCEDURE — 36415 COLL VENOUS BLD VENIPUNCTURE: CPT | Performed by: INTERNAL MEDICINE

## 2020-07-15 PROCEDURE — 83036 HEMOGLOBIN GLYCOSYLATED A1C: CPT | Performed by: INTERNAL MEDICINE

## 2020-07-15 PROCEDURE — 84439 ASSAY OF FREE THYROXINE: CPT | Performed by: INTERNAL MEDICINE

## 2020-07-15 PROCEDURE — 80053 COMPREHEN METABOLIC PANEL: CPT | Performed by: INTERNAL MEDICINE

## 2020-07-15 PROCEDURE — 84443 ASSAY THYROID STIM HORMONE: CPT | Performed by: INTERNAL MEDICINE

## 2020-07-15 PROCEDURE — 80061 LIPID PANEL: CPT | Performed by: INTERNAL MEDICINE

## 2020-07-20 DIAGNOSIS — E78.5 HYPERLIPIDEMIA LDL GOAL <130: ICD-10-CM

## 2020-07-20 DIAGNOSIS — E03.9 HYPOTHYROIDISM, UNSPECIFIED TYPE: ICD-10-CM

## 2020-07-20 DIAGNOSIS — Z00.00 ROUTINE HISTORY AND PHYSICAL EXAMINATION OF ADULT: ICD-10-CM

## 2020-07-20 RX ORDER — SIMVASTATIN 40 MG
TABLET ORAL
Qty: 90 TABLET | Refills: 0 | OUTPATIENT
Start: 2020-07-20

## 2020-07-20 RX ORDER — LEVOTHYROXINE SODIUM 112 UG/1
TABLET ORAL
Qty: 90 TABLET | Refills: 0 | OUTPATIENT
Start: 2020-07-20

## 2020-07-20 NOTE — TELEPHONE ENCOUNTER
Upcoming appt w/ Ximena, Gela Abdullahi on 7/22/20, should have enough medication to make it to appt.    Estela ADAMS RN, BSN

## 2020-07-21 ASSESSMENT — ENCOUNTER SYMPTOMS
BREAST MASS: 0
HEARTBURN: 0
SHORTNESS OF BREATH: 0
SORE THROAT: 0
DYSURIA: 0
ABDOMINAL PAIN: 0
DIARRHEA: 0
CONSTIPATION: 1
COUGH: 0
FREQUENCY: 1
HEMATURIA: 0
HEADACHES: 1
JOINT SWELLING: 0
ARTHRALGIAS: 0
PARESTHESIAS: 0
MYALGIAS: 0
NAUSEA: 0
EYE PAIN: 0
DIZZINESS: 0
NERVOUS/ANXIOUS: 0
WEAKNESS: 0
HEMATOCHEZIA: 0
CHILLS: 0
PALPITATIONS: 0
FEVER: 0

## 2020-07-21 ASSESSMENT — ACTIVITIES OF DAILY LIVING (ADL): CURRENT_FUNCTION: NO ASSISTANCE NEEDED

## 2020-07-21 NOTE — PATIENT INSTRUCTIONS
Patient Education   Personalized Prevention Plan  You are due for the preventive services outlined below.  Your care team is available to assist you in scheduling these services.  If you have already completed any of these items, please share that information with your care team to update in your medical record.  Health Maintenance Due   Topic Date Due     Anxiety Assessment  04/22/2016     PHQ-2  01/01/2020     Annual Wellness Visit  05/02/2020     FALL RISK ASSESSMENT  05/02/2020     Mammogram  06/18/2020          YOU MAY CONTACT [facility] -[phone] TO SCHEDULE YOUR MAMMOGRAM AT YOUR CONVENIENCE.       Adjust dose of Thyroid medication sn reassess in 6-8 weks.

## 2020-07-21 NOTE — PROGRESS NOTES
"Chief Complaint   Patient presents with     Wellness Visit     Hypertension     Lipids     Thyroid Problem       SUBJECTIVE:   Abby Casey is a 75 year old female who presents for Preventive Visit.    Discussed additional charges that may incur if addressing non physical related concerns.   Pt agreeable.  SNOW Dinh LPN    Are you in the first 12 months of your Medicare coverage?  No    Healthy Habits:     In general, how would you rate your overall health?  Good    Frequency of exercise:  2-3 days/week    Duration of exercise:  15-30 minutes    Do you usually eat at least 4 servings of fruit and vegetables a day, include whole grains    & fiber and avoid regularly eating high fat or \"junk\" foods?  Yes    Taking medications regularly:  Yes    Medication side effects:  None    Ability to successfully perform activities of daily living:  No assistance needed    Home Safety:  No safety concerns identified    Hearing Impairment:  No hearing concerns    In the past 6 months, have you been bothered by leaking of urine?  No    In general, how would you rate your overall mental or emotional health?  Good      PHQ-2 Total Score: 0    Additional concerns today:  No    Do you feel safe in your environment? Yes    Have you ever done Advance Care Planning? (For example, a Health Directive, POLST, or a discussion with a medical provider or your loved ones about your wishes): Yes, advance care planning is on file.      Fall risk  Fallen 2 or more times in the past year?: No  Any fall with injury in the past year?: No    Cognitive Screening   1) Repeat 3 items (Leader, Season, Table)    2) Clock draw: NORMAL  3) 3 item recall: Recalls 3 objects  Results: 3 items recalled: COGNITIVE IMPAIRMENT LESS LIKELY    Mini-CogTM Copyright MARCOS Urrutia. Licensed by the author for use in [facility]; reprinted with permission (chanel@.[facility]). All rights reserved.      Do you have sleep apnea, excessive snoring or daytime " drowsiness?: no    Reviewed and updated as needed this visit by clinical staff  Tobacco  Allergies  Meds  Problems  Med Hx  Surg Hx  Fam Hx  Soc Hx          Reviewed and updated as needed this visit by Provider  Tobacco  Allergies  Meds  Problems  Med Hx  Surg Hx  Fam Hx        Social History     Tobacco Use     Smoking status: Never Smoker     Smokeless tobacco: Never Used   Substance Use Topics     Alcohol use: Yes     Comment: 3 drinks per month         Alcohol Use 7/21/2020   Prescreen: >3 drinks/day or >7 drinks/week? No   Prescreen: >3 drinks/day or >7 drinks/week? -           Hyperlipidemia Follow-Up      Are you regularly taking any medication or supplement to lower your cholesterol?   Yes- simvastatin    Are you having muscle aches or other side effects that you think could be caused by your cholesterol lowering medication?  No    Hypertension Follow-up      Do you check your blood pressure regularly outside of the clinic? Yes     Are you following a low salt diet? Yes    Are your blood pressures ever more than 140 on the top number (systolic) OR more   than 90 on the bottom number (diastolic), for example 140/90? No    Hypothyroidism Follow-up      Since last visit, patient describes the following symptoms: Weight stable, no hair loss, no skin changes, no constipation, no loose stools  TSH   Date Value Ref Range Status   07/15/2020 0.32 (L) 0.40 - 4.00 mU/L Final      T4 Free   Date Value Ref Range Status   07/15/2020 1.56 (H) 0.76 - 1.46 ng/dL Final             Current providers sharing in care for this patient include:   Patient Care Team:  Gela Bueno MD as PCP - [facility] as Pharmacist (Pharmacist)  Wegener, Joel Daniel Irwin, MD as Assigned PCP    The following health maintenance items are reviewed in Epic and correct as of today:  Health Maintenance   Topic Date Due     ANCELMO ASSESSMENT  04/22/2016     FALL RISK ASSESSMENT  05/02/2020     MAMMO SCREENING   06/18/2020     INFLUENZA VACCINE (1) 09/01/2020     DTAP/TDAP/TD IMMUNIZATION (3 - Td) 09/15/2020     LIPID  07/15/2021     TSH W/FREE T4 REFLEX  07/15/2021     MEDICARE ANNUAL WELLNESS VISIT  07/22/2021     ADVANCE CARE PLANNING  05/02/2024     COLORECTAL CANCER SCREENING  11/16/2026     DEXA  Completed     HEPATITIS C SCREENING  Completed     PHQ-2  Completed     PNEUMOCOCCAL IMMUNIZATION 65+ LOW/MEDIUM RISK  Completed     ZOSTER IMMUNIZATION  Completed     IPV IMMUNIZATION  Aged Out     MENINGITIS IMMUNIZATION  Aged Out     HEPATITIS B IMMUNIZATION  Aged Out     Labs reviewed in EPIC  BP Readings from Last 3 Encounters:   07/22/20 130/70   02/27/20 119/64   01/28/20 (!) 144/66    Wt Readings from Last 3 Encounters:   07/22/20 74.4 kg (164 lb 1.6 oz)   02/27/20 76.1 kg (167 lb 11.2 oz)   12/16/19 75.3 kg (166 lb)                  Patient Active Problem List   Diagnosis     Hypothyroidism     Abnormal blood chemistry     Disorder of bone and cartilage     Diverticulosis of large intestine     Lichen sclerosus et atrophicus of the vulva     HYPERLIPIDEMIA LDL GOAL <130     Elevated glucose     Advanced directives, counseling/discussion     Papanicolaou smear of cervix with low grade squamous intraepithelial lesion (LGSIL)     Acute pain of right shoulder     Right rotator cuff tear     Rupture of right proximal biceps tendon     Tendinopathy of right rotator cuff     Osteoarthritis of right acromioclavicular joint     Adjustment disorder with anxious mood     Obesity     Past Surgical History:   Procedure Laterality Date     C NONSPECIFIC PROCEDURE  10/99    Colposcopy--reactive atypia         COLONOSCOPY      2006 and 2016     COLONOSCOPY N/A 11/16/2016    Procedure: COLONOSCOPY;  Surgeon: Adriano Roberto MD, MD;  Location: [facility]     DILATION AND CURETTAGE, OPERATIVE HYSTEROSCOPY WITH MORCELLATOR, COMBINED N/A 05/16/2017    endometrial polypectomy for PMB     DILATION AND CURETTAGE, OPERATIVE HYSTEROSCOPY WITH  MORCELLATOR, COMBINED N/A 2017    Procedure: COMBINED DILATION AND CURETTAGE, OPERATIVE HYSTEROSCOPY WITH MORCELLATOR;   DILATION AND CURETTAGE, OPERATIVE HYSTEROSCOPY WITH MORCELLATOR;  Surgeon: Negra Olivia MD;  Location: [facility] OR     EYE SURGERY      Bilateral cataract extraction     HC REMOVAL GALLBLADDER      Ngozi       HYSTERECTOMY       TUBAL LIGATION         Social History     Tobacco Use     Smoking status: Never Smoker     Smokeless tobacco: Never Used   Substance Use Topics     Alcohol use: Yes     Comment: 3 drinks per month     Family History   Problem Relation Age of Onset     Heart Disease Father          @77     Cancer Father         Liver Cancer, Eye  Cancer     Diabetes Brother      Diabetes Brother      Diabetes Sister      Colon Cancer No family hx of          Current Outpatient Medications   Medication Sig Dispense Refill     CALCIUM + D 600-200 MG-UNIT OR TABS 1 TABLET DAILY       Chromium-Cinnamon 100-500 MCG-MG CAPS Take 1 tablet by mouth 2 times daily       guaiFENesin (MUCINEX) 600 MG 12 hr tablet Take 2 tablets by mouth 2 times daily as needed for congestion. 40 tablet 0     halobetasol (ULTRAVATE) 0.05 % external cream Use at bedtime weekly for maintenance of Lichen sclerosis. Pt requesting alternative to Clobetasol due to cost. 50 g 1     levothyroxine (SYNTHROID/LEVOTHROID) 100 MCG tablet Take 1 tablet (100 mcg) by mouth daily Dose adjusted; reassess lab in 6-8 weeks 90 tablet 1     MULTIVITAMIN OR 1 daily       NAPROXEN PO Take 220 mg by mouth 2 times daily as needed for moderate pain       propranolol (INDERAL) 40 MG tablet Take 1 tablet (40 mg) by mouth 2 times daily 180 tablet 3     PROSHIELD PLUS SKIN PROTECTANT 1 % EX CREA use as needed  up to QID 1 tube 1     simvastatin (ZOCOR) 40 MG tablet Take 1 tablet (40 mg) by mouth At Bedtime 90 tablet 3     TURMERIC PO Take 1 tablet by mouth daily       Allergies   Allergen Reactions     Neurontin [Gabapentin]  "Visual Disturbance     Amoxicillin Hives     Clindamycin Hcl Diarrhea     Recent Labs   Lab Test 07/15/20  0822 04/25/19  0815 04/24/18  0820   A1C 5.9* 5.8* 5.9*   LDL 76 94 81   HDL 43* 46* 43*   TRIG 132 141 133   ALT 22 25 26   CR 0.75 0.69 0.59   GFRESTIMATED 78 86 >90   GFRESTBLACK >90 >90 >90   POTASSIUM 5.1 4.5 4.0   TSH 0.32* 0.68 0.67      Mammogram Screening: Mammogram Screening: Patient over age 50, mutual decision to screen reflected in health maintenance.    Review of Systems   Constitutional: Negative for chills and fever.   HENT: Negative for congestion, ear pain, hearing loss and sore throat.    Eyes: Negative for pain and visual disturbance.   Respiratory: Negative for cough and shortness of breath.    Cardiovascular: Negative for chest pain, palpitations and peripheral edema.   Gastrointestinal: Positive for constipation. Negative for abdominal pain, diarrhea, heartburn, hematochezia and nausea.   Breasts:  Negative for tenderness, breast mass and discharge.   Genitourinary: Positive for frequency. Negative for dysuria, genital sores, hematuria, pelvic pain, urgency, vaginal bleeding and vaginal discharge.   Musculoskeletal: Negative for arthralgias, joint swelling and myalgias.   Skin: Negative for rash.   Neurological: Positive for headaches. Negative for dizziness, weakness and paresthesias.   Psychiatric/Behavioral: Negative for mood changes. The patient is not nervous/anxious.          OBJECTIVE:   /70   Pulse 54   Temp 98.2  F (36.8  C) (Oral)   Resp 15   Ht 1.568 m (5' 1.75\")   Wt 74.4 kg (164 lb 1.6 oz)   LMP  (LMP Unknown)   SpO2 96%   BMI 30.26 kg/m   Estimated body mass index is 30.26 kg/m  as calculated from the following:    Height as of this encounter: 1.568 m (5' 1.75\").    Weight as of this encounter: 74.4 kg (164 lb 1.6 oz).  Physical Exam  GENERAL: healthy, alert and no distress  EYES: Eyes grossly normal to inspection, PERRL and conjunctivae and sclerae " normal  HENT: ear canals and TM's normal, nose and mouth without ulcers or lesions  NECK: no adenopathy, no asymmetry, masses, or scars and thyroid normal to palpation  RESP: lungs clear to auscultation - no rales, rhonchi or wheezes  BREAST: normal without masses, tenderness or nipple discharge and no palpable axillary masses or adenopathy  CV: regular rates and rhythm, normal S1 S2, no S3 or S4, peripheral pulses strong and no peripheral edema  ABDOMEN: soft, nontender and bowel sounds normal  MS: no gross musculoskeletal defects noted, no edema  NEURO: Normal strength and tone, sensory exam grossly normal, mentation intact, gait normal including heel/toe/tandem walking and Romberg normal  PSYCH: mentation appears normal, affect normal/bright    Diagnostic Test Results:  Labs reviewed in Epic    ASSESSMENT / PLAN:   (Z00.00) Encounter for Medicare annual wellness exam  (primary encounter diagnosis)  Comment: HEALTH CARE MAINTENANCE reviewed;   Plan: influenza vaccination recommended in the Fall 2020   Life line screening done 12/14/2018; reviewed report with pt; Colonosocopy done 2016, mammo 2018, desires peter 2020, discussed shingrix- considering.     (E78.5) Hyperlipidemia LDL goal <130  Comment: statin dose and labs reviewed  Plan: simvastatin (ZOCOR) 40 MG tablet          (I10) Essential hypertension  Comment: bp well controlled;  medications reviewed  Plan: propranolol (INDERAL) 40 MG tablet          (E03.9) Hypothyroidism, unspecified type  Comment: reviewed lab results; suggests need for lower dose. decrease to Levothyroxine 100 mcg per day; recommend reassess thyroid labs in 6-8 weeks; future lab orders placed.   Plan: levothyroxine (SYNTHROID/LEVOTHROID) 100 MCG         tablet, TSH with free T4 reflex    (Z12.31) Visit for screening mammogram  Comment: Clinical Breast Exam   Plan: MA Screening Digital Bilateral          (M85.80) Osteopenia, unspecified location  Comment: screening DEXA recommended,  "  Plan:  DX Hip/Pelvis/Spine        Dietary calcium, vitamin D supplement,     (Z78.0) Menopause  Comment:  Plan:  DX Hip/Pelvis/Spine             COUNSELING:  Reviewed preventive health counseling, as reflected in patient instructions       Regular exercise       Healthy diet/nutrition       Immunizations    Discussed immunizations, wiling to consider Shingrix          Estimated body mass index is 30.26 kg/m  as calculated from the following:    Height as of this encounter: 1.568 m (5' 1.75\").    Weight as of this encounter: 74.4 kg (164 lb 1.6 oz).    Weight management plan: Discussed healthy diet and exercise guidelines     reports that she has never smoked. She has never used smokeless tobacco.      Appropriate preventive services were discussed with this patient, including applicable screening as appropriate for cardiovascular disease, diabetes, osteopenia/osteoporosis, and glaucoma.  As appropriate for age/gender, discussed screening for colorectal cancer, prostate cancer, breast cancer, and cervical cancer. Checklist reviewing preventive services available has been given to the patient.    Reviewed patients plan of care and provided an AVS. The Basic Care Plan (routine screening as documented in Health Maintenance) for Abby meets the Care Plan requirement. This Care Plan has been established and reviewed with the Patient.    Counseling Resources:  ATP IV Guidelines  Pooled Cohorts Equation Calculator  Breast Cancer Risk Calculator  FRAX Risk Assessment  ICSI Preventive Guidelines  Dietary Guidelines for Americans, 2010  USDA's MyPlate  ASA Prophylaxis  Lung CA Screening    Gela Bueno MD  Internal Medicine   [facility] [place]     15 minutes in addition to HEALTH CARE MAINTENANCE are spent with patient, over 50% of that time spent providing counselling, discussing and reviewing medical conditions/concerns, meds and potential side effects.   Identified Health Risks:  "

## 2020-07-22 ENCOUNTER — OFFICE VISIT (OUTPATIENT)
Dept: FAMILY MEDICINE | Facility: CLINIC | Age: 75
End: 2020-07-22
Payer: COMMERCIAL

## 2020-07-22 VITALS
HEIGHT: 62 IN | RESPIRATION RATE: 15 BRPM | DIASTOLIC BLOOD PRESSURE: 70 MMHG | BODY MASS INDEX: 30.2 KG/M2 | OXYGEN SATURATION: 96 % | TEMPERATURE: 98.2 F | SYSTOLIC BLOOD PRESSURE: 130 MMHG | WEIGHT: 164.1 LBS | HEART RATE: 54 BPM

## 2020-07-22 DIAGNOSIS — E03.9 HYPOTHYROIDISM, UNSPECIFIED TYPE: ICD-10-CM

## 2020-07-22 DIAGNOSIS — Z00.00 ENCOUNTER FOR MEDICARE ANNUAL WELLNESS EXAM: Primary | ICD-10-CM

## 2020-07-22 DIAGNOSIS — E78.5 HYPERLIPIDEMIA LDL GOAL <130: ICD-10-CM

## 2020-07-22 DIAGNOSIS — I10 ESSENTIAL HYPERTENSION: ICD-10-CM

## 2020-07-22 DIAGNOSIS — Z00.00 ROUTINE HISTORY AND PHYSICAL EXAMINATION OF ADULT: ICD-10-CM

## 2020-07-22 DIAGNOSIS — Z12.31 VISIT FOR SCREENING MAMMOGRAM: ICD-10-CM

## 2020-07-22 DIAGNOSIS — M85.80 OSTEOPENIA, UNSPECIFIED LOCATION: ICD-10-CM

## 2020-07-22 DIAGNOSIS — Z78.0 MENOPAUSE: ICD-10-CM

## 2020-07-22 PROCEDURE — 99397 PER PM REEVAL EST PAT 65+ YR: CPT | Performed by: INTERNAL MEDICINE

## 2020-07-22 PROCEDURE — 99213 OFFICE O/P EST LOW 20 MIN: CPT | Mod: 25 | Performed by: INTERNAL MEDICINE

## 2020-07-22 RX ORDER — LEVOTHYROXINE SODIUM 100 UG/1
100 TABLET ORAL DAILY
Qty: 90 TABLET | Refills: 1 | Status: SHIPPED | OUTPATIENT
Start: 2020-07-22 | End: 2020-12-02

## 2020-07-22 RX ORDER — PROPRANOLOL HYDROCHLORIDE 40 MG/1
40 TABLET ORAL 2 TIMES DAILY
Qty: 180 TABLET | Refills: 3 | Status: SHIPPED | OUTPATIENT
Start: 2020-07-22 | End: 2021-07-26

## 2020-07-22 RX ORDER — LEVOTHYROXINE SODIUM 112 UG/1
112 TABLET ORAL DAILY
Qty: 90 TABLET | Refills: 3 | Status: CANCELLED | OUTPATIENT
Start: 2020-07-22

## 2020-07-22 RX ORDER — SIMVASTATIN 40 MG
40 TABLET ORAL AT BEDTIME
Qty: 90 TABLET | Refills: 3 | Status: SHIPPED | OUTPATIENT
Start: 2020-07-22 | End: 2021-07-12

## 2020-07-22 ASSESSMENT — ANXIETY QUESTIONNAIRES
3. WORRYING TOO MUCH ABOUT DIFFERENT THINGS: NOT AT ALL
7. FEELING AFRAID AS IF SOMETHING AWFUL MIGHT HAPPEN: NOT AT ALL
2. NOT BEING ABLE TO STOP OR CONTROL WORRYING: NOT AT ALL
6. BECOMING EASILY ANNOYED OR IRRITABLE: NOT AT ALL
IF YOU CHECKED OFF ANY PROBLEMS ON THIS QUESTIONNAIRE, HOW DIFFICULT HAVE THESE PROBLEMS MADE IT FOR YOU TO DO YOUR WORK, TAKE CARE OF THINGS AT HOME, OR GET ALONG WITH OTHER PEOPLE: NOT DIFFICULT AT ALL
5. BEING SO RESTLESS THAT IT IS HARD TO SIT STILL: NOT AT ALL
1. FEELING NERVOUS, ANXIOUS, OR ON EDGE: NOT AT ALL
GAD7 TOTAL SCORE: 0

## 2020-07-22 ASSESSMENT — PATIENT HEALTH QUESTIONNAIRE - PHQ9
SUM OF ALL RESPONSES TO PHQ QUESTIONS 1-9: 0
5. POOR APPETITE OR OVEREATING: NOT AT ALL

## 2020-07-22 ASSESSMENT — MIFFLIN-ST. JEOR: SCORE: 1193.63

## 2020-07-23 ASSESSMENT — ANXIETY QUESTIONNAIRES: GAD7 TOTAL SCORE: 0

## 2020-07-31 ENCOUNTER — ANCILLARY PROCEDURE (OUTPATIENT)
Dept: BONE DENSITY | Facility: CLINIC | Age: 75
End: 2020-07-31
Attending: INTERNAL MEDICINE
Payer: COMMERCIAL

## 2020-07-31 DIAGNOSIS — Z78.0 MENOPAUSE: ICD-10-CM

## 2020-07-31 DIAGNOSIS — M85.80 OSTEOPENIA, UNSPECIFIED LOCATION: ICD-10-CM

## 2020-07-31 PROCEDURE — 77085 DXA BONE DENSITY AXL VRT FX: CPT | Performed by: INTERNAL MEDICINE

## 2020-08-18 ENCOUNTER — HOSPITAL ENCOUNTER (OUTPATIENT)
Dept: MAMMOGRAPHY | Facility: CLINIC | Age: 75
Discharge: HOME | End: 2020-08-18
Attending: INTERNAL MEDICINE | Admitting: INTERNAL MEDICINE
Payer: COMMERCIAL

## 2020-08-18 DIAGNOSIS — Z12.31 VISIT FOR SCREENING MAMMOGRAM: ICD-10-CM

## 2020-08-18 PROCEDURE — 77063 BREAST TOMOSYNTHESIS BI: CPT

## 2020-09-09 DIAGNOSIS — E03.9 HYPOTHYROIDISM, UNSPECIFIED TYPE: ICD-10-CM

## 2020-09-09 LAB — TSH SERPL DL<=0.005 MIU/L-ACNC: 1.68 MU/L (ref 0.4–4)

## 2020-09-09 PROCEDURE — 84443 ASSAY THYROID STIM HORMONE: CPT | Performed by: INTERNAL MEDICINE

## 2020-09-09 PROCEDURE — 36415 COLL VENOUS BLD VENIPUNCTURE: CPT | Performed by: INTERNAL MEDICINE

## 2020-12-02 DIAGNOSIS — E03.9 HYPOTHYROIDISM, UNSPECIFIED TYPE: ICD-10-CM

## 2020-12-02 RX ORDER — LEVOTHYROXINE SODIUM 100 UG/1
100 TABLET ORAL DAILY
Qty: 90 TABLET | Refills: 1 | Status: SHIPPED | OUTPATIENT
Start: 2020-12-02 | End: 2021-07-12

## 2021-01-17 DIAGNOSIS — E03.9 HYPOTHYROIDISM, UNSPECIFIED TYPE: ICD-10-CM

## 2021-01-19 RX ORDER — LEVOTHYROXINE SODIUM 100 UG/1
TABLET ORAL
Qty: 90 TABLET | Refills: 0 | OUTPATIENT
Start: 2021-01-19

## 2021-03-01 ENCOUNTER — IMMUNIZATION (OUTPATIENT)
Dept: NURSING | Facility: CLINIC | Age: 76
End: 2021-03-01
Payer: COMMERCIAL

## 2021-03-01 PROCEDURE — 0011A PR COVID VAC MODERNA 100 MCG/0.5 ML IM: CPT

## 2021-03-01 PROCEDURE — 91301 PR COVID VAC MODERNA 100 MCG/0.5 ML IM: CPT

## 2021-03-29 ENCOUNTER — IMMUNIZATION (OUTPATIENT)
Dept: NURSING | Facility: CLINIC | Age: 76
End: 2021-03-29
Attending: INTERNAL MEDICINE
Payer: COMMERCIAL

## 2021-03-29 PROCEDURE — 91301 PR COVID VAC MODERNA 100 MCG/0.5 ML IM: CPT

## 2021-03-29 PROCEDURE — 0012A PR COVID VAC MODERNA 100 MCG/0.5 ML IM: CPT

## 2021-04-21 ENCOUNTER — OFFICE VISIT (OUTPATIENT)
Dept: FAMILY MEDICINE | Facility: CLINIC | Age: 76
End: 2021-04-21
Payer: COMMERCIAL

## 2021-04-21 VITALS
HEIGHT: 62 IN | DIASTOLIC BLOOD PRESSURE: 72 MMHG | WEIGHT: 171.6 LBS | BODY MASS INDEX: 31.58 KG/M2 | HEART RATE: 55 BPM | RESPIRATION RATE: 14 BRPM | SYSTOLIC BLOOD PRESSURE: 126 MMHG | TEMPERATURE: 97.6 F | OXYGEN SATURATION: 99 %

## 2021-04-21 DIAGNOSIS — N81.11 MIDLINE CYSTOCELE: Primary | ICD-10-CM

## 2021-04-21 PROCEDURE — 99213 OFFICE O/P EST LOW 20 MIN: CPT | Performed by: INTERNAL MEDICINE

## 2021-04-21 ASSESSMENT — PATIENT HEALTH QUESTIONNAIRE - PHQ9
5. POOR APPETITE OR OVEREATING: NOT AT ALL
SUM OF ALL RESPONSES TO PHQ QUESTIONS 1-9: 3

## 2021-04-21 ASSESSMENT — ANXIETY QUESTIONNAIRES
1. FEELING NERVOUS, ANXIOUS, OR ON EDGE: SEVERAL DAYS
GAD7 TOTAL SCORE: 2
IF YOU CHECKED OFF ANY PROBLEMS ON THIS QUESTIONNAIRE, HOW DIFFICULT HAVE THESE PROBLEMS MADE IT FOR YOU TO DO YOUR WORK, TAKE CARE OF THINGS AT HOME, OR GET ALONG WITH OTHER PEOPLE: NOT DIFFICULT AT ALL
7. FEELING AFRAID AS IF SOMETHING AWFUL MIGHT HAPPEN: NOT AT ALL
3. WORRYING TOO MUCH ABOUT DIFFERENT THINGS: NOT AT ALL
2. NOT BEING ABLE TO STOP OR CONTROL WORRYING: NOT AT ALL
6. BECOMING EASILY ANNOYED OR IRRITABLE: NOT AT ALL
5. BEING SO RESTLESS THAT IT IS HARD TO SIT STILL: SEVERAL DAYS

## 2021-04-21 ASSESSMENT — MIFFLIN-ST. JEOR: SCORE: 1218.68

## 2021-04-21 NOTE — PROGRESS NOTES
"    Assessment & Plan     (N81.11) Midline cystocele  (primary encounter diagnosis)  Comment: cystocele noted with valsalva; retracts when relax pelvic floor, discussed risks with heavy lifting ( recent cleaning,etc), recommend core strengthening; reviewed strategies to help empty bladder;   Plan: UROLOGY ADULT REFERRAL           20 minutes spent on the date of the encounter doing chart review, history and exam, documentation and further activities per the note       BMI:   Estimated body mass index is 31.9 kg/m  as calculated from the following:    Height as of this encounter: 1.562 m (5' 1.5\").    Weight as of this encounter: 77.8 kg (171 lb 9.6 oz).   Weight management plan: Discussed healthy diet and exercise guidelines    CONSULTATION/REFERRAL to Urology Referral    Return in about 4 weeks (around 5/19/2021) for urology consultation recommended.    Gela Bueno MD  Internal Medicine   [facility] LANI Mora is a 76 year old who presents for the following health issues:    HPI       Vaginal Symptoms  Onset/Duration: Noticed a few months ago  Description: Was putting her vaginal cream on and noticed on the right side there is a small, soft protrusion.    Vaginal Discharge: none   Itching (Pruritis): no  Burning sensation:  no  Odor: no  Accompanying Signs & Symptoms:  Urinary symptoms: YES- smell to urine  Abdominal pain: no  Fever: no  History:   Sexually active: no  New Partner: no  Possibility of Pregnancy:  no  Recent antibiotic use: no  Previous vaginitis issues: no  Precipitating or alleviating factors: None  Therapies tried and outcome: none      Review of Systems   CONSTITUTIONAL: NEGATIVE for fever, chills, change in weight  RESP: NEGATIVE for significant cough or SOB  CV: NEGATIVE for chest pain, palpitations or peripheral edema  GI: NEGATIVE for nausea, abdominal pain, heartburn, or change in bowel habits  : prolapse reported by pt.; no " "dysuria   MUSCULOSKELETAL: NEGATIVE for significant arthralgias or myalgia  ENDOCRINE: NEGATIVE for temperature intolerance, skin/hair changes  PSYCHIATRIC: NEGATIVE for changes in mood or affect      Objective    /72 (BP Location: Right arm, Patient Position: Sitting, Cuff Size: Adult Regular)   Pulse 55   Temp 97.6  F (36.4  C) (Oral)   Resp 14   Ht 1.562 m (5' 1.5\")   Wt 77.8 kg (171 lb 9.6 oz)   LMP  (LMP Unknown)   SpO2 99%   BMI 31.90 kg/m    Body mass index is 31.9 kg/m .  Physical Exam   GENERAL: healthy, alert and no distress  NECK: no adenopathy, no asymmetry, masses, or scars and thyroid normal to palpation  RESP: lungs clear to auscultation - no rales, rhonchi or wheezes  CV: regular rates and rhythm, normal S1 S2, no S3 or S4 and no peripheral edema  ABDOMEN: soft, nontender and bowel sounds normal   (female): cystocele noted with valsalva; retracts when relax pelvic floor,  MS: no gross musculoskeletal defects noted, no edema  NEURO: Normal strength and tone, mentation intact and speech normal  PSYCH: mentation appears normal, affect normal/bright          "

## 2021-04-22 ASSESSMENT — ANXIETY QUESTIONNAIRES: GAD7 TOTAL SCORE: 2

## 2021-07-10 DIAGNOSIS — E03.9 HYPOTHYROIDISM, UNSPECIFIED TYPE: ICD-10-CM

## 2021-07-10 DIAGNOSIS — E78.5 HYPERLIPIDEMIA LDL GOAL <130: ICD-10-CM

## 2021-07-12 ENCOUNTER — DOCUMENTATION ONLY (OUTPATIENT)
Dept: LAB | Facility: CLINIC | Age: 76
End: 2021-07-12
Payer: COMMERCIAL

## 2021-07-12 DIAGNOSIS — E03.9 HYPOTHYROIDISM, UNSPECIFIED TYPE: Primary | ICD-10-CM

## 2021-07-12 RX ORDER — LEVOTHYROXINE SODIUM 100 UG/1
TABLET ORAL
Qty: 90 TABLET | Refills: 0 | Status: SHIPPED | OUTPATIENT
Start: 2021-07-12 | End: 2021-07-26

## 2021-07-12 RX ORDER — SIMVASTATIN 40 MG
TABLET ORAL
Qty: 90 TABLET | Refills: 0 | Status: SHIPPED | OUTPATIENT
Start: 2021-07-12 | End: 2021-07-26

## 2021-07-12 NOTE — TELEPHONE ENCOUNTER
Medication is being filled for 1 time refill only due to:  Patient needs to be seen because due for lab work, has appt scheduled. - this is for simvastatin and levothyroxine

## 2021-07-16 ENCOUNTER — DOCUMENTATION ONLY (OUTPATIENT)
Dept: FAMILY MEDICINE | Facility: CLINIC | Age: 76
End: 2021-07-16

## 2021-07-16 ENCOUNTER — LAB (OUTPATIENT)
Dept: LAB | Facility: CLINIC | Age: 76
End: 2021-07-16
Payer: COMMERCIAL

## 2021-07-16 DIAGNOSIS — E03.9 HYPOTHYROIDISM, UNSPECIFIED TYPE: ICD-10-CM

## 2021-07-16 DIAGNOSIS — E78.5 HYPERLIPIDEMIA LDL GOAL <130: ICD-10-CM

## 2021-07-16 DIAGNOSIS — E03.9 HYPOTHYROIDISM, UNSPECIFIED TYPE: Primary | ICD-10-CM

## 2021-07-16 PROCEDURE — 80061 LIPID PANEL: CPT

## 2021-07-16 PROCEDURE — 36415 COLL VENOUS BLD VENIPUNCTURE: CPT

## 2021-07-16 PROCEDURE — 84443 ASSAY THYROID STIM HORMONE: CPT

## 2021-07-16 NOTE — PROGRESS NOTES
"Patient has lab only appt TODAY 7/16/21 @ 10:45am for \"Lab only for annual wellness visit on 7/26\", no orders in chart.  Please place FUTURE orders in Epic if appropriate.    Thanks,   lab    "

## 2021-07-17 LAB
CHOLEST SERPL-MCNC: 164 MG/DL
FASTING STATUS PATIENT QL REPORTED: YES
HDLC SERPL-MCNC: 53 MG/DL
LDLC SERPL CALC-MCNC: 80 MG/DL
NONHDLC SERPL-MCNC: 111 MG/DL
TRIGL SERPL-MCNC: 155 MG/DL
TSH SERPL DL<=0.005 MIU/L-ACNC: 2.6 MU/L (ref 0.4–4)

## 2021-07-23 ASSESSMENT — ENCOUNTER SYMPTOMS
HEARTBURN: 0
NAUSEA: 0
ARTHRALGIAS: 0
WEAKNESS: 0
CONSTIPATION: 0
HEADACHES: 1
SORE THROAT: 0
CHILLS: 0
HEMATOCHEZIA: 0
COUGH: 0
DIARRHEA: 0
DIZZINESS: 0
HEMATURIA: 0
DYSURIA: 0
ABDOMINAL PAIN: 0
FEVER: 0
MYALGIAS: 0
JOINT SWELLING: 0
SHORTNESS OF BREATH: 0
BREAST MASS: 0
FREQUENCY: 1
NERVOUS/ANXIOUS: 0
PARESTHESIAS: 0
EYE PAIN: 0
PALPITATIONS: 0

## 2021-07-23 ASSESSMENT — ACTIVITIES OF DAILY LIVING (ADL): CURRENT_FUNCTION: NO ASSISTANCE NEEDED

## 2021-07-26 ENCOUNTER — OFFICE VISIT (OUTPATIENT)
Dept: FAMILY MEDICINE | Facility: CLINIC | Age: 76
End: 2021-07-26
Payer: COMMERCIAL

## 2021-07-26 VITALS
DIASTOLIC BLOOD PRESSURE: 68 MMHG | HEART RATE: 58 BPM | HEIGHT: 62 IN | TEMPERATURE: 97.5 F | RESPIRATION RATE: 16 BRPM | SYSTOLIC BLOOD PRESSURE: 130 MMHG | OXYGEN SATURATION: 98 % | WEIGHT: 167 LBS | BODY MASS INDEX: 30.73 KG/M2

## 2021-07-26 DIAGNOSIS — R73.09 ABNORMAL GLUCOSE: ICD-10-CM

## 2021-07-26 DIAGNOSIS — E03.9 HYPOTHYROIDISM, UNSPECIFIED TYPE: ICD-10-CM

## 2021-07-26 DIAGNOSIS — I10 ESSENTIAL HYPERTENSION: ICD-10-CM

## 2021-07-26 DIAGNOSIS — E78.5 HYPERLIPIDEMIA LDL GOAL <130: ICD-10-CM

## 2021-07-26 DIAGNOSIS — Z00.00 ENCOUNTER FOR MEDICARE ANNUAL WELLNESS EXAM: Primary | ICD-10-CM

## 2021-07-26 DIAGNOSIS — N90.4 LICHEN SCLEROSUS ET ATROPHICUS OF THE VULVA: ICD-10-CM

## 2021-07-26 DIAGNOSIS — Z12.31 VISIT FOR SCREENING MAMMOGRAM: ICD-10-CM

## 2021-07-26 PROCEDURE — 99214 OFFICE O/P EST MOD 30 MIN: CPT | Mod: 25 | Performed by: INTERNAL MEDICINE

## 2021-07-26 PROCEDURE — 99397 PER PM REEVAL EST PAT 65+ YR: CPT | Performed by: INTERNAL MEDICINE

## 2021-07-26 RX ORDER — PROPRANOLOL HYDROCHLORIDE 40 MG/1
40 TABLET ORAL 2 TIMES DAILY
Qty: 180 TABLET | Refills: 4 | Status: SHIPPED | OUTPATIENT
Start: 2021-07-26 | End: 2022-07-28

## 2021-07-26 RX ORDER — SIMVASTATIN 40 MG
40 TABLET ORAL AT BEDTIME
Qty: 90 TABLET | Refills: 4 | Status: SHIPPED | OUTPATIENT
Start: 2021-07-26 | End: 2022-07-28

## 2021-07-26 RX ORDER — HALOBETASOL PROPIONATE 0.5 MG/G
CREAM TOPICAL
Qty: 50 G | Refills: 1 | Status: SHIPPED | OUTPATIENT
Start: 2021-07-26 | End: 2022-11-23

## 2021-07-26 RX ORDER — LEVOTHYROXINE SODIUM 100 UG/1
100 TABLET ORAL DAILY
Qty: 90 TABLET | Refills: 4 | Status: SHIPPED | OUTPATIENT
Start: 2021-07-26 | End: 2022-07-28

## 2021-07-26 ASSESSMENT — ENCOUNTER SYMPTOMS
ABDOMINAL PAIN: 0
WEAKNESS: 0
FREQUENCY: 1
DIZZINESS: 0
PARESTHESIAS: 0
SORE THROAT: 0
HEARTBURN: 0
EYE PAIN: 0
HEMATURIA: 0
JOINT SWELLING: 0
NERVOUS/ANXIOUS: 0
HEADACHES: 1
DIARRHEA: 0
CHILLS: 0
FEVER: 0
CONSTIPATION: 0
PALPITATIONS: 0
NAUSEA: 0
BREAST MASS: 0
ARTHRALGIAS: 0
SHORTNESS OF BREATH: 0
COUGH: 0
MYALGIAS: 0
DYSURIA: 0
HEMATOCHEZIA: 0

## 2021-07-26 ASSESSMENT — ACTIVITIES OF DAILY LIVING (ADL): CURRENT_FUNCTION: NO ASSISTANCE NEEDED

## 2021-07-26 ASSESSMENT — MIFFLIN-ST. JEOR: SCORE: 1197.82

## 2021-07-26 NOTE — PROGRESS NOTES
"Chief Complaint   Patient presents with     Medicare Visit       SUBJECTIVE:   Abby Casey is a 76 year old female who presents for Preventive Visit.    Patient has been advised of split billing requirements and indicates understanding: Yes     Are you in the first 12 months of your Medicare coverage?  No    Healthy Habits:     In general, how would you rate your overall health?  Good    Frequency of exercise:  2-3 days/week    Duration of exercise:  Less than 15 minutes    Do you usually eat at least 4 servings of fruit and vegetables a day, include whole grains    & fiber and avoid regularly eating high fat or \"junk\" foods?  Yes    Taking medications regularly:  Yes    Ability to successfully perform activities of daily living:  No assistance needed    Home Safety:  No safety concerns identified    Hearing Impairment:  No hearing concerns    In the past 6 months, have you been bothered by leaking of urine?  No    In general, how would you rate your overall mental or emotional health?  Good      PHQ-2 Total Score: 0    Additional concerns today:  No    Do you feel safe in your environment? Yes    Have you ever done Advance Care Planning? (For example, a Health Directive, POLST, or a discussion with a medical provider or your loved ones about your wishes): Yes, advance care planning is on file.      Fall risk  Fallen 2 or more times in the past year?: No  Any fall with injury in the past year?: No      Cognitive Screening   1) Repeat 3 items (Leader, Season, Table)    2) Clock draw: NORMAL  3) 3 item recall: Recalls 3 objects  Results: 3 items recalled: COGNITIVE IMPAIRMENT LESS LIKELY    Mini-CogTM Copyright MARCOS Urrutia. Licensed by the author for use in [facility]; reprinted with permission (chanel@.[email or web address]). All rights reserved.      Do you have sleep apnea, excessive snoring or daytime drowsiness?: Daytime drowsiness    Reviewed and updated as needed this visit by clinical staff  Tobacco  Allergies "  Meds   Med Hx  Surg Hx  Fam Hx  Soc Hx        Reviewed and updated as needed this visit by Provider  Tobacco  Allergies  Meds  Problems  Med Hx  Surg Hx  Fam Hx         Social History     Tobacco Use     Smoking status: Never Smoker     Smokeless tobacco: Never Used   Substance Use Topics     Alcohol use: Yes     Comment: social only         Alcohol Use 7/23/2021   Prescreen: >3 drinks/day or >7 drinks/week? No   Prescreen: >3 drinks/day or >7 drinks/week? -         Current providers sharing in care for this patient include:   Patient Care Team:  Gela Bueno MD as PCP - General  Gela Bueno MD as Assigned PCP  Josette Pineda RPH as Pharmacist (Pharmacist)    The following health maintenance items are reviewed in Epic and correct as of today:  Health Maintenance Due   Topic Date Due     FALL RISK ASSESSMENT  07/22/2021     MAMMO SCREENING  08/18/2021     Labs reviewed in EPIC  BP Readings from Last 3 Encounters:   07/26/21 130/68   04/21/21 126/72   07/22/20 130/70    Wt Readings from Last 3 Encounters:   07/26/21 75.8 kg (167 lb)   04/21/21 77.8 kg (171 lb 9.6 oz)   07/22/20 74.4 kg (164 lb 1.6 oz)                  Patient Active Problem List   Diagnosis     Hypothyroidism     Abnormal blood chemistry     Disorder of bone and cartilage     Diverticulosis of large intestine     Lichen sclerosus et atrophicus of the vulva     HYPERLIPIDEMIA LDL GOAL <130     Elevated glucose     Advanced directives, counseling/discussion     Papanicolaou smear of cervix with low grade squamous intraepithelial lesion (LGSIL)     Acute pain of right shoulder     Right rotator cuff tear     Rupture of right proximal biceps tendon     Tendinopathy of right rotator cuff     Osteoarthritis of right acromioclavicular joint     Adjustment disorder with anxious mood     Obesity     Past Surgical History:   Procedure Laterality Date     BIOPSY  2000, 2017    Done by Dr. Mosley; Dr. Olivia     COLONOSCOPY        and      COLONOSCOPY N/A 2016    Procedure: COLONOSCOPY;  Surgeon: Adriano Roberto MD, MD;  Location: [facility]     DILATION AND CURETTAGE, OPERATIVE HYSTEROSCOPY WITH MORCELLATOR, COMBINED N/A 2017    endometrial polypectomy for PMB     DILATION AND CURETTAGE, OPERATIVE HYSTEROSCOPY WITH MORCELLATOR, COMBINED N/A 2017    Procedure: COMBINED DILATION AND CURETTAGE, OPERATIVE HYSTEROSCOPY WITH MORCELLATOR;   DILATION AND CURETTAGE, OPERATIVE HYSTEROSCOPY WITH MORCELLATOR;  Surgeon: Negra Olivia MD;  Location: [facility] OR     EYE SURGERY      Bilateral cataract extraction     HC REMOVAL GALLBLADDER      Ngozi       HYSTERECTOMY       TUBAL LIGATION       Z NONSPECIFIC PROCEDURE  10/99    Colposcopy--reactive atypia           Social History     Tobacco Use     Smoking status: Never Smoker     Smokeless tobacco: Never Used   Substance Use Topics     Alcohol use: Yes     Comment: social only     Family History   Problem Relation Age of Onset     Heart Disease Father          @77     Cancer Father         Liver Cancer, Eye  Cancer     Coronary Artery Disease Father      Other Cancer Father         Liver, eye, skin     Coronary Artery Disease Mother      Osteoporosis Mother      Diabetes Brother      Diabetes Brother      Diabetes Sister      Diabetes Sister      Osteoporosis Sister      Diabetes Brother      Diabetes Brother      Colon Cancer No family hx of          Current Outpatient Medications   Medication Sig Dispense Refill     CALCIUM + D 600-200 MG-UNIT OR TABS 1 TABLET DAILY       Chromium-Cinnamon 100-500 MCG-MG CAPS Take 1 tablet by mouth 2 times daily       guaiFENesin (MUCINEX) 600 MG 12 hr tablet Take 2 tablets by mouth 2 times daily as needed for congestion. 40 tablet 0     halobetasol (ULTRAVATE) 0.05 % external cream Use at bedtime weekly for maintenance of Lichen sclerosis. Pt requesting alternative to Clobetasol due to cost. 50 g 1     levothyroxine  (SYNTHROID/LEVOTHROID) 100 MCG tablet Take 1 tablet (100 mcg) by mouth daily 90 tablet 4     MULTIVITAMIN OR 1 daily       NAPROXEN PO Take 220 mg by mouth 2 times daily as needed for moderate pain       propranolol (INDERAL) 40 MG tablet Take 1 tablet (40 mg) by mouth 2 times daily 180 tablet 4     PROSHIELD PLUS SKIN PROTECTANT 1 % EX CREA use as needed  up to QID 1 tube 1     simvastatin (ZOCOR) 40 MG tablet Take 1 tablet (40 mg) by mouth At Bedtime 90 tablet 4     TURMERIC PO Take 1 tablet by mouth daily       Allergies   Allergen Reactions     Neurontin [Gabapentin] Visual Disturbance     Amoxicillin Hives     Clindamycin Hcl Diarrhea     Recent Labs   Lab Test 07/16/21  1041 09/09/20  0828 07/15/20  0822 04/25/19  0815 04/24/18  0820   A1C  --   --  5.9* 5.8* 5.9*   LDL 80  --  76 94 81   HDL 53  --  43* 46* 43*   TRIG 155*  --  132 141 133   ALT  --   --  22 25 26   CR  --   --  0.75 0.69 0.59   GFRESTIMATED  --   --  78 86 >90   GFRESTBLACK  --   --  >90 >90 >90   POTASSIUM  --   --  5.1 4.5 4.0   TSH 2.60 1.68 0.32* 0.68 0.67          Mammogram Screening: Recommended mammography every 1-2 years with patient discussion and risk factor consideration  Pertinent mammograms are reviewed under the imaging tab.    Review of Systems   Constitutional: Negative for chills and fever.   HENT: Negative for congestion, ear pain, hearing loss and sore throat.    Eyes: Negative for pain and visual disturbance.   Respiratory: Negative for cough and shortness of breath.    Cardiovascular: Negative for chest pain, palpitations and peripheral edema.   Gastrointestinal: Negative for abdominal pain, constipation, diarrhea, heartburn, hematochezia and nausea.   Breasts:  Negative for tenderness, breast mass and discharge.   Genitourinary: Positive for frequency. Negative for dysuria, genital sores, hematuria, pelvic pain, urgency, vaginal bleeding and vaginal discharge.   Musculoskeletal: Negative for arthralgias, joint swelling  "and myalgias.   Skin: Negative for rash.   Neurological: Positive for headaches. Negative for dizziness, weakness and paresthesias.   Psychiatric/Behavioral: Negative for mood changes. The patient is not nervous/anxious.          OBJECTIVE:   /68 (BP Location: Right arm, Patient Position: Sitting, Cuff Size: Adult Regular)   Pulse 58   Temp 97.5  F (36.4  C) (Oral)   Resp 16   Ht 1.562 m (5' 1.5\")   Wt 75.8 kg (167 lb)   LMP  (LMP Unknown)   SpO2 98%   BMI 31.04 kg/m   Estimated body mass index is 31.04 kg/m  as calculated from the following:    Height as of this encounter: 1.562 m (5' 1.5\").    Weight as of this encounter: 75.8 kg (167 lb).  Physical Exam  GENERAL: healthy, alert and no distress  EYES: Eyes grossly normal to inspection  HENT: ear canals and TM's normal, nose and mouth without ulcers or lesions  NECK: no adenopathy, no asymmetry, masses, or scars and thyroid normal to palpation  RESP: lungs clear to auscultation - no rales, rhonchi or wheezes  CV: regular rate and rhythm, normal S1 S2, no S3 or S4, no murmur, click or rub, no peripheral edema and peripheral pulses strong  ABDOMEN: soft, nontender, no hepatosplenomegaly, no masses and bowel sounds normal  MS: no gross musculoskeletal defects noted, no edema  NEURO: Normal strength and tone, sensory exam grossly normal, mentation intact, gait normal including heel/toe/tandem walking and Romberg normal  PSYCH: mentation appears normal, affect normal/bright    Diagnostic Test Results:  Labs reviewed in Epic    ASSESSMENT / PLAN:   (Z00.00) Encounter for Medicare annual wellness exam  (primary encounter diagnosis)  Comment: HEALTH CARE MAINTENANCE reviewed  Plan:     (E03.9) Hypothyroidism, unspecified type  Comment: reviewed lab results;past lowered dose to Levothyroxine 100 mcg per day; well tolerated; and effective.   Lab Results   Component Value Date    TSH 2.60 07/16/2021    TSH 1.68 09/09/2020    TSH 0.32 07/15/2020        Plan: " "levothyroxine (SYNTHROID/LEVOTHROID) 100 MCG tablet        Continue current dose    (E78.5) Hyperlipidemia LDL goal <130  Comment: statin dose and labs reviewed; due to update labs and assess meds; well tolerated.  Plan: simvastatin (ZOCOR) 40 MG tablet, Comprehensive        metabolic panel (BMP + Alb, Alk Phos, ALT, AST,        Total. Bili, TP)          (R73.09) Abnormal glucose  Comment:   Lab Results   Component Value Date     07/15/2020     04/25/2019     Plan: Hemoglobin A1c        Risk for diabetes and prediabetes reviewed.  Healthy eating and regular exercise encouraged.    (I10) Essential hypertension  Comment: BLOOD PRESSURE well controlled; medications reviewed.  Plan: propranolol (INDERAL) 40 MG tablet,         Comprehensive metabolic panel (BMP + Alb, Alk         Phos, ALT, AST, Total. Bili, TP)          (N90.4) Lichen sclerosus et atrophicus of the vulva  Comment: has previously followed with GYN; pt requesting PCP to fill vulvar exam and renew meds since 4/2019  Plan: halobetasol (ULTRAVATE) 0.05 % external cream          (Z12.31) Visit for screening mammogram  Comment:   Plan: MA SCREENING DIGITAL BILAT - Future  (s+30)              Patient has been advised of split billing requirements and indicates understanding: Yes  COUNSELING:  Reviewed preventive health counseling, as reflected in patient instructions       Regular exercise       Healthy diet/nutrition    Estimated body mass index is 31.04 kg/m  as calculated from the following:    Height as of this encounter: 1.562 m (5' 1.5\").    Weight as of this encounter: 75.8 kg (167 lb).    Weight management plan: Discussed healthy diet and exercise guidelines    She reports that she has never smoked. She has never used smokeless tobacco.      Appropriate preventive services were discussed with this patient, including applicable screening as appropriate for cardiovascular disease, diabetes, osteopenia/osteoporosis, and glaucoma.  As " appropriate for age/gender, discussed screening for colorectal cancer, prostate cancer, breast cancer, and cervical cancer. Checklist reviewing preventive services available has been given to the patient.    Reviewed patients plan of care and provided an AVS. The Complex Care Plan (for patients with higher acuity and needing more deliberate coordination of services) for Abby meets the Care Plan requirement. This Care Plan has been established and reviewed with the Patient.    Counseling Resources:  ATP IV Guidelines  Pooled Cohorts Equation Calculator  Breast Cancer Risk Calculator  Breast Cancer: Medication to Reduce Risk  FRAX Risk Assessment  ICSI Preventive Guidelines  Dietary Guidelines for Americans, 2010  [employer] MyPlate  ASA Prophylaxis  Lung CA Screening    Gela Bueno MD  Internal Medicine   [facility]    30 minutes in addition to HEALTH CARE MAINTENANCE are spent with patient, over 50% of that time spent providing counselling, discussing and reviewing medical conditions/concerns, meds and potential side effects.    Identified Health Risks:

## 2021-07-26 NOTE — PATIENT INSTRUCTIONS
Patient Education   Personalized Prevention Plan  You are due for the preventive services outlined below.  Your care team is available to assist you in scheduling these services.  If you have already completed any of these items, please share that information with your care team to update in your medical record.  Health Maintenance Due   Topic Date Due     FALL RISK ASSESSMENT  07/22/2021     Annual Wellness Visit  07/22/2021     Mammogram  08/18/2021

## 2021-10-12 ENCOUNTER — LAB (OUTPATIENT)
Dept: LAB | Facility: CLINIC | Age: 76
End: 2021-10-12
Payer: COMMERCIAL

## 2021-10-12 DIAGNOSIS — I10 ESSENTIAL HYPERTENSION: ICD-10-CM

## 2021-10-12 DIAGNOSIS — E78.5 HYPERLIPIDEMIA LDL GOAL <130: ICD-10-CM

## 2021-10-12 DIAGNOSIS — R73.09 ABNORMAL GLUCOSE: ICD-10-CM

## 2021-10-12 LAB — HBA1C MFR BLD: 5.9 % (ref 0–5.6)

## 2021-10-12 PROCEDURE — 83036 HEMOGLOBIN GLYCOSYLATED A1C: CPT

## 2021-10-12 PROCEDURE — 36415 COLL VENOUS BLD VENIPUNCTURE: CPT

## 2021-10-12 PROCEDURE — 80053 COMPREHEN METABOLIC PANEL: CPT

## 2021-10-13 LAB
ALBUMIN SERPL-MCNC: 3.5 G/DL (ref 3.4–5)
ALP SERPL-CCNC: 91 U/L (ref 40–150)
ALT SERPL W P-5'-P-CCNC: 23 U/L (ref 0–50)
ANION GAP SERPL CALCULATED.3IONS-SCNC: 5 MMOL/L (ref 3–14)
AST SERPL W P-5'-P-CCNC: 21 U/L (ref 0–45)
BILIRUB SERPL-MCNC: 0.4 MG/DL (ref 0.2–1.3)
BUN SERPL-MCNC: 18 MG/DL (ref 7–30)
CALCIUM SERPL-MCNC: 9 MG/DL (ref 8.5–10.1)
CHLORIDE BLD-SCNC: 107 MMOL/L (ref 94–109)
CO2 SERPL-SCNC: 26 MMOL/L (ref 20–32)
CREAT SERPL-MCNC: 0.73 MG/DL (ref 0.52–1.04)
GFR SERPL CREATININE-BSD FRML MDRD: 81 ML/MIN/1.73M2
GLUCOSE BLD-MCNC: 130 MG/DL (ref 70–99)
POTASSIUM BLD-SCNC: 5.1 MMOL/L (ref 3.4–5.3)
PROT SERPL-MCNC: 6.9 G/DL (ref 6.8–8.8)
SODIUM SERPL-SCNC: 138 MMOL/L (ref 133–144)

## 2021-10-23 ENCOUNTER — HEALTH MAINTENANCE LETTER (OUTPATIENT)
Age: 76
End: 2021-10-23

## 2022-03-01 ENCOUNTER — HOSPITAL ENCOUNTER (OUTPATIENT)
Dept: MAMMOGRAPHY | Facility: CLINIC | Age: 77
Discharge: HOME | End: 2022-03-01
Attending: INTERNAL MEDICINE | Admitting: INTERNAL MEDICINE
Payer: COMMERCIAL

## 2022-03-01 DIAGNOSIS — Z12.31 VISIT FOR SCREENING MAMMOGRAM: ICD-10-CM

## 2022-03-01 PROCEDURE — 77067 SCR MAMMO BI INCL CAD: CPT

## 2022-06-06 ENCOUNTER — MYC MEDICAL ADVICE (OUTPATIENT)
Dept: FAMILY MEDICINE | Facility: CLINIC | Age: 77
End: 2022-06-06
Payer: COMMERCIAL

## 2022-06-06 DIAGNOSIS — R73.09 ELEVATED GLUCOSE: Primary | ICD-10-CM

## 2022-06-06 DIAGNOSIS — I10 ESSENTIAL HYPERTENSION: ICD-10-CM

## 2022-06-06 DIAGNOSIS — E03.9 HYPOTHYROIDISM, UNSPECIFIED TYPE: ICD-10-CM

## 2022-06-06 DIAGNOSIS — E78.5 HYPERLIPIDEMIA LDL GOAL <130: ICD-10-CM

## 2022-07-21 ENCOUNTER — LAB (OUTPATIENT)
Dept: LAB | Facility: CLINIC | Age: 77
End: 2022-07-21
Payer: COMMERCIAL

## 2022-07-21 DIAGNOSIS — E03.9 HYPOTHYROIDISM, UNSPECIFIED TYPE: ICD-10-CM

## 2022-07-21 DIAGNOSIS — R73.09 ELEVATED GLUCOSE: ICD-10-CM

## 2022-07-21 DIAGNOSIS — I10 ESSENTIAL HYPERTENSION: ICD-10-CM

## 2022-07-21 LAB
ALBUMIN SERPL-MCNC: 3.5 G/DL (ref 3.4–5)
ALP SERPL-CCNC: 82 U/L (ref 40–150)
ALT SERPL W P-5'-P-CCNC: 23 U/L (ref 0–50)
ANION GAP SERPL CALCULATED.3IONS-SCNC: 9 MMOL/L (ref 3–14)
AST SERPL W P-5'-P-CCNC: 23 U/L (ref 0–45)
BILIRUB SERPL-MCNC: 0.4 MG/DL (ref 0.2–1.3)
BUN SERPL-MCNC: 17 MG/DL (ref 7–30)
CALCIUM SERPL-MCNC: 9.5 MG/DL (ref 8.5–10.1)
CHLORIDE BLD-SCNC: 105 MMOL/L (ref 94–109)
CHOLEST SERPL-MCNC: 138 MG/DL
CO2 SERPL-SCNC: 25 MMOL/L (ref 20–32)
CREAT SERPL-MCNC: 0.72 MG/DL (ref 0.52–1.04)
FASTING STATUS PATIENT QL REPORTED: YES
GFR SERPL CREATININE-BSD FRML MDRD: 86 ML/MIN/1.73M2
GLUCOSE BLD-MCNC: 141 MG/DL (ref 70–99)
HBA1C MFR BLD: 6.1 % (ref 0–5.6)
HDLC SERPL-MCNC: 43 MG/DL
LDLC SERPL CALC-MCNC: 70 MG/DL
NONHDLC SERPL-MCNC: 95 MG/DL
POTASSIUM BLD-SCNC: 4.3 MMOL/L (ref 3.4–5.3)
PROT SERPL-MCNC: 6.8 G/DL (ref 6.8–8.8)
SODIUM SERPL-SCNC: 139 MMOL/L (ref 133–144)
TRIGL SERPL-MCNC: 123 MG/DL
TSH SERPL DL<=0.005 MIU/L-ACNC: 3.39 MU/L (ref 0.4–4)

## 2022-07-21 PROCEDURE — 84443 ASSAY THYROID STIM HORMONE: CPT

## 2022-07-21 PROCEDURE — 83036 HEMOGLOBIN GLYCOSYLATED A1C: CPT

## 2022-07-21 PROCEDURE — 80053 COMPREHEN METABOLIC PANEL: CPT

## 2022-07-21 PROCEDURE — 36415 COLL VENOUS BLD VENIPUNCTURE: CPT

## 2022-07-21 PROCEDURE — 80061 LIPID PANEL: CPT

## 2022-07-25 ASSESSMENT — ENCOUNTER SYMPTOMS
FREQUENCY: 0
NERVOUS/ANXIOUS: 0
COUGH: 0
NAUSEA: 0
ARTHRALGIAS: 0
ABDOMINAL PAIN: 0
HEMATOCHEZIA: 0
WEAKNESS: 0
SORE THROAT: 0
DYSURIA: 0
SHORTNESS OF BREATH: 0
FEVER: 0
JOINT SWELLING: 0
HEARTBURN: 0
DIARRHEA: 0
MYALGIAS: 0
PALPITATIONS: 0
HEMATURIA: 0
EYE PAIN: 0
DIZZINESS: 0
HEADACHES: 0
PARESTHESIAS: 0
CHILLS: 0
BREAST MASS: 0
CONSTIPATION: 0

## 2022-07-25 ASSESSMENT — ANXIETY QUESTIONNAIRES
GAD7 TOTAL SCORE: 0
GAD7 TOTAL SCORE: 0
7. FEELING AFRAID AS IF SOMETHING AWFUL MIGHT HAPPEN: NOT AT ALL
7. FEELING AFRAID AS IF SOMETHING AWFUL MIGHT HAPPEN: NOT AT ALL
1. FEELING NERVOUS, ANXIOUS, OR ON EDGE: NOT AT ALL
GAD7 TOTAL SCORE: 0
5. BEING SO RESTLESS THAT IT IS HARD TO SIT STILL: NOT AT ALL
3. WORRYING TOO MUCH ABOUT DIFFERENT THINGS: NOT AT ALL
2. NOT BEING ABLE TO STOP OR CONTROL WORRYING: NOT AT ALL
6. BECOMING EASILY ANNOYED OR IRRITABLE: NOT AT ALL
4. TROUBLE RELAXING: NOT AT ALL

## 2022-07-25 ASSESSMENT — ACTIVITIES OF DAILY LIVING (ADL): CURRENT_FUNCTION: NO ASSISTANCE NEEDED

## 2022-07-28 ENCOUNTER — OFFICE VISIT (OUTPATIENT)
Dept: FAMILY MEDICINE | Facility: CLINIC | Age: 77
End: 2022-07-28
Payer: COMMERCIAL

## 2022-07-28 VITALS
HEART RATE: 62 BPM | BODY MASS INDEX: 31.3 KG/M2 | DIASTOLIC BLOOD PRESSURE: 64 MMHG | TEMPERATURE: 98.2 F | WEIGHT: 170.1 LBS | OXYGEN SATURATION: 96 % | HEIGHT: 62 IN | RESPIRATION RATE: 15 BRPM | SYSTOLIC BLOOD PRESSURE: 132 MMHG

## 2022-07-28 DIAGNOSIS — M85.80 OSTEOPENIA, UNSPECIFIED LOCATION: ICD-10-CM

## 2022-07-28 DIAGNOSIS — Z78.0 MENOPAUSE PRESENT: ICD-10-CM

## 2022-07-28 DIAGNOSIS — Z00.00 ENCOUNTER FOR MEDICARE ANNUAL WELLNESS EXAM: Primary | ICD-10-CM

## 2022-07-28 DIAGNOSIS — E78.5 HYPERLIPIDEMIA LDL GOAL <130: ICD-10-CM

## 2022-07-28 DIAGNOSIS — I10 ESSENTIAL HYPERTENSION: ICD-10-CM

## 2022-07-28 DIAGNOSIS — E03.9 HYPOTHYROIDISM, UNSPECIFIED TYPE: ICD-10-CM

## 2022-07-28 PROCEDURE — G0439 PPPS, SUBSEQ VISIT: HCPCS | Performed by: INTERNAL MEDICINE

## 2022-07-28 PROCEDURE — 99214 OFFICE O/P EST MOD 30 MIN: CPT | Mod: 25 | Performed by: INTERNAL MEDICINE

## 2022-07-28 RX ORDER — SIMVASTATIN 40 MG
40 TABLET ORAL AT BEDTIME
Qty: 90 TABLET | Refills: 4 | Status: SHIPPED | OUTPATIENT
Start: 2022-07-28 | End: 2023-08-23

## 2022-07-28 RX ORDER — AMLODIPINE BESYLATE 2.5 MG/1
2.5 TABLET ORAL DAILY
Qty: 90 TABLET | Refills: 1 | Status: SHIPPED | OUTPATIENT
Start: 2022-07-28 | End: 2023-01-24

## 2022-07-28 RX ORDER — PROPRANOLOL HYDROCHLORIDE 40 MG/1
40 TABLET ORAL 2 TIMES DAILY
Qty: 180 TABLET | Refills: 4 | Status: CANCELLED | OUTPATIENT
Start: 2022-07-28

## 2022-07-28 RX ORDER — LEVOTHYROXINE SODIUM 100 UG/1
100 TABLET ORAL DAILY
Qty: 90 TABLET | Refills: 4 | Status: SHIPPED | OUTPATIENT
Start: 2022-07-28 | End: 2023-08-23

## 2022-07-28 ASSESSMENT — ENCOUNTER SYMPTOMS
NERVOUS/ANXIOUS: 0
HEADACHES: 0
WEAKNESS: 0
PARESTHESIAS: 0
ABDOMINAL PAIN: 0
CONSTIPATION: 0
JOINT SWELLING: 0
ARTHRALGIAS: 0
CHILLS: 0
HEMATOCHEZIA: 0
PALPITATIONS: 0
DIZZINESS: 0
SHORTNESS OF BREATH: 0
FREQUENCY: 0
MYALGIAS: 0
DIARRHEA: 0
FEVER: 0
EYE PAIN: 0
BREAST MASS: 0
HEARTBURN: 0
COUGH: 0
DYSURIA: 0
HEMATURIA: 0
SORE THROAT: 0
NAUSEA: 0

## 2022-07-28 ASSESSMENT — ACTIVITIES OF DAILY LIVING (ADL): CURRENT_FUNCTION: NO ASSISTANCE NEEDED

## 2022-07-28 ASSESSMENT — PAIN SCALES - GENERAL: PAINLEVEL: NO PAIN (1)

## 2022-07-28 NOTE — PROGRESS NOTES
"Chief Complaint   Patient presents with     Wellness Visit     Thyroid Problem     Lipids     Hypertension       SUBJECTIVE:   Abby Casey is a 77 year old female who presents for Preventive Visit.      Patient has been advised of split billing requirements and indicates understanding: Yes  Are you in the first 12 months of your Medicare coverage?  No    Healthy Habits:     In general, how would you rate your overall health?  Good    Duration of exercise:  15-30 minutes    Do you usually eat at least 4 servings of fruit and vegetables a day, include whole grains    & fiber and avoid regularly eating high fat or \"junk\" foods?  Yes    Taking medications regularly:  Yes    Medication side effects:  None    Ability to successfully perform activities of daily living:  No assistance needed    Home Safety:  No safety concerns identified    Hearing Impairment:  No hearing concerns    In the past 6 months, have you been bothered by leaking of urine?  No    In general, how would you rate your overall mental or emotional health?  Good      PHQ-2 Total Score: 0    Additional concerns today:  Yes    Do you feel safe in your environment? Yes    Have you ever done Advance Care Planning? (For example, a Health Directive, POLST, or a discussion with a medical provider or your loved ones about your wishes): Yes, advance care planning is on file.       Fall risk  Fallen 2 or more times in the past year?: No  Any fall with injury in the past year?: No    Cognitive Screening   1) Repeat 3 items (Leader, Season, Table)    2) Clock draw: NORMAL  3) 3 item recall: Recalls 3 objects  Results: 3 items recalled: COGNITIVE IMPAIRMENT LESS LIKELY    Mini-CogTM Copyright MARCOS Urrutia. Licensed by the author for use in [facility]; reprinted with permission (chanel@.[facility]). All rights reserved.      Do you have sleep apnea, excessive snoring or daytime drowsiness?: no    Reviewed and updated as needed this visit by clinical staff   " Tobacco  Allergies  Meds   Med Hx  Surg Hx  Fam Hx  Soc Hx          Reviewed and updated as needed this visit by Provider   Tobacco  Allergies  Meds  Problems  Med Hx  Surg Hx  Fam Hx           Social History     Tobacco Use     Smoking status: Never Smoker     Smokeless tobacco: Never Used   Substance Use Topics     Alcohol use: Yes     Comment: social only         Alcohol Use 7/25/2022   Prescreen: >3 drinks/day or >7 drinks/week? No   Prescreen: >3 drinks/day or >7 drinks/week? -         Hyperlipidemia Follow-Up      Are you regularly taking any medication or supplement to lower your cholesterol?   Yes- simvastatin    Are you having muscle aches or other side effects that you think could be caused by your cholesterol lowering medication?  No    Hypertension Follow-up  ( would like to talk about her Propranolol ( for anxiety) and wondering if she can stop taking it.  Feels that she has some side effects)     Anxiety - taking Propranolol. She is concerned that it may affect her BLOOD PRESSURE.      Do you check your blood pressure regularly outside of the clinic? Yes     Are you following a low salt diet? Yes    Are your blood pressures ever more than 140 on the top number (systolic) OR more   than 90 on the bottom number (diastolic), for example 140/90? No    Hypothyroidism Follow-up      Since last visit, patient describes the following symptoms: dry skin and fatigue      Current providers sharing in care for this patient include:   Patient Care Team:  Gela Bueno MD as PCP - General  Gela Bueno MD as Assigned PCP  Josette Pineda RPH as Pharmacist (Pharmacist)    The following health maintenance items are reviewed in Epic and correct as of today:  Health Maintenance Due   Topic Date Due     ANNUAL REVIEW OF HM ORDERS  04/21/2022     MEDICARE ANNUAL WELLNESS VISIT  07/26/2022     Labs reviewed in EPIC  BP Readings from Last 3 Encounters:   07/28/22 132/64   07/26/21 130/68    04/21/21 126/72    Wt Readings from Last 3 Encounters:   07/28/22 77.2 kg (170 lb 1.6 oz)   07/26/21 75.8 kg (167 lb)   04/21/21 77.8 kg (171 lb 9.6 oz)                  Patient Active Problem List   Diagnosis     Hypothyroidism     Abnormal blood chemistry     Disorder of bone and cartilage     Diverticulosis of large intestine     Lichen sclerosus et atrophicus of the vulva     HYPERLIPIDEMIA LDL GOAL <130     Elevated glucose     Advanced directives, counseling/discussion     Papanicolaou smear of cervix with low grade squamous intraepithelial lesion (LGSIL)     Acute pain of right shoulder     Right rotator cuff tear     Rupture of right proximal biceps tendon     Tendinopathy of right rotator cuff     Osteoarthritis of right acromioclavicular joint     Adjustment disorder with anxious mood     Obesity     BP check     Past Surgical History:   Procedure Laterality Date     BIOPSY  2000, 2017    Done by Dr. Mosley; Dr. Olivia     COLONOSCOPY      2006 and 2016     COLONOSCOPY N/A 11/16/2016    Procedure: COLONOSCOPY;  Surgeon: Adriano Roberto MD, MD;  Location: [facility]     DILATION AND CURETTAGE, OPERATIVE HYSTEROSCOPY WITH MORCELLATOR, COMBINED N/A 05/16/2017    endometrial polypectomy for PMB     DILATION AND CURETTAGE, OPERATIVE HYSTEROSCOPY WITH MORCELLATOR, COMBINED N/A 5/16/2017    Procedure: COMBINED DILATION AND CURETTAGE, OPERATIVE HYSTEROSCOPY WITH MORCELLATOR;   DILATION AND CURETTAGE, OPERATIVE HYSTEROSCOPY WITH MORCELLATOR;  Surgeon: Negra Olivia MD;  Location: [facility] OR     EYE SURGERY  2012    Bilateral cataract extraction     HC REMOVAL GALLBLADDER  1980    [facility]       HYSTERECTOMY       TUBAL LIGATION  1981     [facility] NONSPECIFIC PROCEDURE  10/99    Colposcopy--reactive atypia           Social History     Tobacco Use     Smoking status: Never Smoker     Smokeless tobacco: Never Used   Substance Use Topics     Alcohol use: Yes     Comment: social only     Family History   Problem Relation Age of Onset      Heart Disease Father          @77     Cancer Father         Liver Cancer, Eye  Cancer     Coronary Artery Disease Father      Other Cancer Father         Liver, eye, skin     Coronary Artery Disease Mother      Osteoporosis Mother      Diabetes Brother      Diabetes Brother      Diabetes Sister      Diabetes Sister      Osteoporosis Sister      Diabetes Brother      Diabetes Brother      Colon Cancer No family hx of          Current Outpatient Medications   Medication Sig Dispense Refill     amLODIPine (NORVASC) 2.5 MG tablet Take 1 tablet (2.5 mg) by mouth daily 90 tablet 1     CALCIUM + D 600-200 MG-UNIT OR TABS 1 TABLET DAILY       cinnamon 500 MG TABS Take 2 capsules by mouth daily       guaiFENesin (MUCINEX) 600 MG 12 hr tablet Take 2 tablets by mouth 2 times daily as needed for congestion. 40 tablet 0     halobetasol (ULTRAVATE) 0.05 % external cream Use at bedtime weekly for maintenance of Lichen sclerosis. Pt requesting alternative to Clobetasol due to cost. 50 g 1     levothyroxine (SYNTHROID/LEVOTHROID) 100 MCG tablet Take 1 tablet (100 mcg) by mouth daily 90 tablet 4     MULTIVITAMIN OR 1 daily       NAPROXEN PO Take 220 mg by mouth 2 times daily as needed for moderate pain       PROSHIELD PLUS SKIN PROTECTANT 1 % EX CREA use as needed  up to QID 1 tube 1     simvastatin (ZOCOR) 40 MG tablet Take 1 tablet (40 mg) by mouth At Bedtime 90 tablet 4     TURMERIC PO Take 1 tablet by mouth daily       propranolol (INDERAL) 10 MG tablet Take 1 tablet (10 mg) by mouth 2 times daily as needed (Anxiety) 60 tablet 1     Allergies   Allergen Reactions     Neurontin [Gabapentin] Visual Disturbance     Amoxicillin Hives     Clindamycin Hcl Diarrhea     Recent Labs   Lab Test 22  0828 10/12/21  0849 21  1041 20  0828 07/15/20  0822 19  0815   A1C 6.1* 5.9*  --   --  5.9* 5.8*   LDL 70  --  80  --  76 94   HDL 43*  --  53  --  43* 46*   TRIG 123  --  155*  --  132 141   ALT 23 23  --   --   "22 25   CR 0.72 0.73  --   --  0.75 0.69   GFRESTIMATED 86 81  --   --  78 86   GFRESTBLACK  --   --   --   --  >90 >90   POTASSIUM 4.3 5.1  --   --  5.1 4.5   TSH 3.39  --  2.60   < > 0.32* 0.68    < > = values in this interval not displayed.        Pertinent mammograms are reviewed under the imaging tab.    Review of Systems   Constitutional: Negative for chills and fever.   HENT: Negative for congestion, ear pain, hearing loss and sore throat.    Eyes: Negative for pain and visual disturbance.   Respiratory: Negative for cough and shortness of breath.    Cardiovascular: Negative for chest pain, palpitations and peripheral edema.   Gastrointestinal: Negative for abdominal pain, constipation, diarrhea, heartburn, hematochezia and nausea.   Breasts:  Negative for tenderness, breast mass and discharge.   Genitourinary: Negative for dysuria, frequency, genital sores, hematuria, pelvic pain, urgency, vaginal bleeding and vaginal discharge.   Musculoskeletal: Negative for arthralgias, joint swelling and myalgias.   Skin: Negative for rash.   Neurological: Negative for dizziness, weakness, headaches and paresthesias.   Psychiatric/Behavioral: Negative for mood changes. The patient is not nervous/anxious.          OBJECTIVE:   /64   Pulse 62   Temp 98.2  F (36.8  C) (Oral)   Resp 15   Ht 1.575 m (5' 2\")   Wt 77.2 kg (170 lb 1.6 oz)   LMP  (LMP Unknown)   SpO2 96%   BMI 31.11 kg/m   Estimated body mass index is 31.11 kg/m  as calculated from the following:    Height as of this encounter: 1.575 m (5' 2\").    Weight as of this encounter: 77.2 kg (170 lb 1.6 oz).  Physical Exam  GENERAL: healthy, alert and no distress  EYES: Eyes grossly normal to inspection  NECK: no adenopathy, no asymmetry, masses, or scars and thyroid normal to palpation  RESP: lungs clear to auscultation - no rales, rhonchi or wheezes  BREAST: normal without masses, tenderness or nipple discharge and no palpable axillary masses or " adenopathy  CV: regular rate and rhythm, normal S1 S2, no peripheral edema and peripheral pulses strong  ABDOMEN: soft, nontender, no hepatosplenomegaly, no masses and bowel sounds normal  MS: no gross musculoskeletal defects noted, no edema  NEURO: Normal strength and tone, mentation intact and speech normal  PSYCH: mentation appears normal, affect normal/bright    Diagnostic Test Results:  Labs reviewed in Epic            ASSESSMENT / PLAN:   (Z00.00) Encounter for Medicare annual wellness exam  (primary encounter diagnosis)  Comment: HEALTH CARE MAINTENANCE reveiwed  Plan:     (E03.9) Hypothyroidism, unspecified type  Comment: reviewed lab results; suggests need for lower dose. decrease to Levothyroxine 100 mcg per day  Plan: levothyroxine (SYNTHROID/LEVOTHROID) 100 MCG tablet          (I10) Essential hypertension  Comment: she has been on Propranolol primarily prescribed for anxiety; since she prefers to wean off Propranolol, discussed possible adjustment of BLOOD PRESSURE and would benefit from adding low dose of CCB. Close monitoring of BLOOD PRESSURE will be recommended. May then need to adjust BLOOD PRESSURE meds depending on overall control o f BLOOD PRESSURE when she is off Propranolol and on low dose of CCB  Plan: amLODIPine (NORVASC) 2.5 MG tablet          (E78.5) Hyperlipidemia LDL goal <130  Comment: statin dose and labs reviewed  Plan: simvastatin (ZOCOR) 40 MG tablet          (M85.80) Osteopenia, unspecified location  Comment: monitoring bone health. DEXA 7/13/20 There has been significant increase in bone density of the lumbar spine. There has been a trend toward significant decrease in bone density of the hip(s).  Reassess.  Plan: DX Hip/Pelvis/Spine          (Z78.0) Menopause present  Comment: monitoring bone health. DEXA 7/13/20 There has been significant increase in bone density of the lumbar spine. There has been a trend toward significant decrease in bone density of the hip(s).   "Reassess.  Plan: DX Hip/Pelvis/Spine            Patient has been advised of split billing requirements and indicates understanding: Yes    COUNSELING:  Reviewed preventive health counseling, as reflected in patient instructions       Regular exercise       Healthy diet/nutrition    Estimated body mass index is 31.11 kg/m  as calculated from the following:    Height as of this encounter: 1.575 m (5' 2\").    Weight as of this encounter: 77.2 kg (170 lb 1.6 oz).    Weight management plan: Discussed healthy diet and exercise guidelines    She reports that she has never smoked. She has never used smokeless tobacco.      Appropriate preventive services were discussed with this patient, including applicable screening as appropriate for cardiovascular disease, diabetes, osteopenia/osteoporosis, and glaucoma.  As appropriate for age/gender, discussed screening for colorectal cancer, prostate cancer, breast cancer, and cervical cancer. Checklist reviewing preventive services available has been given to the patient.    Reviewed patients plan of care and provided an AVS. The Complex Care Plan (for patients with higher acuity and needing more deliberate coordination of services) for Abby meets the Care Plan requirement. This Care Plan has been established and reviewed with the Patient.    Counseling Resources:  ATP IV Guidelines  Pooled Cohorts Equation Calculator  Breast Cancer Risk Calculator  Breast Cancer: Medication to Reduce Risk  FRAX Risk Assessment  ICSI Preventive Guidelines  Dietary Guidelines for Americans, 2010  USDA's MyPlate  ASA Prophylaxis  Lung CA Screening    Gela Bueno MD  Internal Medicine   [facility]    30 minutes in addition to HEALTH CARE MAINTENANCE are spent with patient, over 50% of that time spent providing counselling, discussing and reviewing medical conditions/concerns, meds and potential side effects.     Identified Health Risks:  Answers for HPI/ROS " submitted by the patient on 7/25/2022  ANCELMO 7 TOTAL SCORE: 0

## 2022-07-28 NOTE — PATIENT INSTRUCTIONS
Patient Education   Personalized Prevention Plan  You are due for the preventive services outlined below.  Your care team is available to assist you in scheduling these services.  If you have already completed any of these items, please share that information with your care team to update in your medical record.  Health Maintenance Due   Topic Date Due    ANNUAL REVIEW OF  ORDERS  04/21/2022    Annual Wellness Visit  07/26/2022            Blood Pressure medications and changing.    Pt desires to switch Propranolol to CCB    To avoid reflex tachycardia, will cut Propranolol in half and transition to Amlodipine ( Calcium Channel Blocker)    Over 5 days, take half of the Propranolol (20 mg twice per day instead of 40 mg twice daily)      1/2 P   1/2 P     1/2 P     1/2 P     no Propranolol     1/2 A      1/2 A     Full Amlodipine 2.5 mg    Reassess BLOOD PRESSURE after 10  days, If SBP ( top) number is >140 for 3 days,  then increase to Amlodipine 5 mg and contact clinic for new dose to be sent to pharmacy.

## 2022-08-08 ENCOUNTER — TELEPHONE (OUTPATIENT)
Dept: FAMILY MEDICINE | Facility: CLINIC | Age: 77
End: 2022-08-08

## 2022-08-14 DIAGNOSIS — I10 ESSENTIAL HYPERTENSION: ICD-10-CM

## 2022-08-15 ENCOUNTER — ALLIED HEALTH/NURSE VISIT (OUTPATIENT)
Dept: FAMILY MEDICINE | Facility: CLINIC | Age: 77
End: 2022-08-15
Payer: COMMERCIAL

## 2022-08-15 ENCOUNTER — MYC MEDICAL ADVICE (OUTPATIENT)
Dept: FAMILY MEDICINE | Facility: CLINIC | Age: 77
End: 2022-08-15

## 2022-08-15 DIAGNOSIS — I10 ESSENTIAL HYPERTENSION: Primary | ICD-10-CM

## 2022-08-15 DIAGNOSIS — F43.22 ADJUSTMENT DISORDER WITH ANXIOUS MOOD: ICD-10-CM

## 2022-08-15 DIAGNOSIS — F43.22 ADJUSTMENT DISORDER WITH ANXIETY: ICD-10-CM

## 2022-08-15 DIAGNOSIS — Z01.30 BP CHECK: ICD-10-CM

## 2022-08-15 PROCEDURE — 99207 PR NO CHARGE NURSE ONLY: CPT | Performed by: INTERNAL MEDICINE

## 2022-08-15 NOTE — PROGRESS NOTES
Abby Casey was evaluated at [facility] on August 15, 2022 at which time her blood pressure was:    BP Readings from Last 3 Encounters:   07/28/22 132/64   07/26/21 130/68   04/21/21 126/72     Pulse Readings from Last 3 Encounters:   07/28/22 62   07/26/21 58   04/21/21 55       Reviewed lifestyle modifications for blood pressure control and reduction: including making healthy food choices, managing weight, getting regular exercise, smoking cessation, reducing alcohol consumption, monitoring blood pressure regularly.     Symptoms: None    BP Goal:< 140/90 mmHg    BP Assessment:  BP at goal    Potential Reasons for BP too high: Anxiety    BP Follow-Up Plan: Recheck BP in 6 months at pharmacy    Recommendation to Provider: pt just changed meds and was anxious about bp after 2 days    Note completed by: Thank you,  Marisol Cruz, Pharmacist  [facility]

## 2022-08-16 RX ORDER — PROPRANOLOL HYDROCHLORIDE 10 MG/1
10 TABLET ORAL 2 TIMES DAILY PRN
Qty: 60 TABLET | Refills: 1 | Status: SHIPPED | OUTPATIENT
Start: 2022-08-16 | End: 2023-08-23

## 2022-08-16 NOTE — TELEPHONE ENCOUNTER
Karen w/ DN- ok to use propranolol 10 mg BID PRN anxiety.     Pt is NOT on citalopram.     Talked about HR and within normal range. Pt was on propranolol 40 mg BID before. HR is affected by this medication.         Estelle CLARKE RN

## 2022-08-17 RX ORDER — PROPRANOLOL HYDROCHLORIDE 40 MG/1
40 TABLET ORAL 2 TIMES DAILY
Qty: 180 TABLET | Refills: 0 | OUTPATIENT
Start: 2022-08-17

## 2022-08-17 NOTE — TELEPHONE ENCOUNTER
Already approved propranolol (INDERAL) 10 MG tablet 60 tablets with 1 refill on 8/16/2022.     Marianna PAPPAS RN   Patient Advocate Liaison (PAL)  [facility] [place]

## 2022-10-09 ENCOUNTER — HEALTH MAINTENANCE LETTER (OUTPATIENT)
Age: 77
End: 2022-10-09

## 2022-11-08 DIAGNOSIS — N90.4 LICHEN SCLEROSUS ET ATROPHICUS OF THE VULVA: ICD-10-CM

## 2022-11-21 ENCOUNTER — MYC MEDICAL ADVICE (OUTPATIENT)
Dept: FAMILY MEDICINE | Facility: CLINIC | Age: 77
End: 2022-11-21

## 2022-11-23 RX ORDER — HALOBETASOL PROPIONATE 0.5 MG/G
CREAM TOPICAL
Qty: 50 G | Refills: 1 | Status: SHIPPED | OUTPATIENT
Start: 2022-11-23 | End: 2024-09-05

## 2023-02-13 PROCEDURE — 99283 EMERGENCY DEPT VISIT LOW MDM: CPT

## 2023-02-14 ENCOUNTER — APPOINTMENT (OUTPATIENT)
Dept: GENERAL RADIOLOGY | Facility: CLINIC | Age: 78
End: 2023-02-14
Attending: EMERGENCY MEDICINE
Payer: COMMERCIAL

## 2023-02-14 ENCOUNTER — HOSPITAL ENCOUNTER (EMERGENCY)
Facility: CLINIC | Age: 78
Discharge: HOME | End: 2023-02-14
Attending: EMERGENCY MEDICINE | Admitting: EMERGENCY MEDICINE
Payer: COMMERCIAL

## 2023-02-14 VITALS
SYSTOLIC BLOOD PRESSURE: 157 MMHG | TEMPERATURE: 98.1 F | OXYGEN SATURATION: 99 % | RESPIRATION RATE: 20 BRPM | HEART RATE: 89 BPM | DIASTOLIC BLOOD PRESSURE: 79 MMHG

## 2023-02-14 DIAGNOSIS — S29.9XXA INJURY OF CHEST WALL, INITIAL ENCOUNTER: ICD-10-CM

## 2023-02-14 PROCEDURE — 71101 X-RAY EXAM UNILAT RIBS/CHEST: CPT | Mod: LT

## 2023-02-14 ASSESSMENT — ENCOUNTER SYMPTOMS: COUGH: 1

## 2023-02-14 ASSESSMENT — ACTIVITIES OF DAILY LIVING (ADL): ADLS_ACUITY_SCORE: 33

## 2023-02-14 NOTE — ED PROVIDER NOTES
History     Chief Complaint:  Rib Pain       HPI   Abby Casey is a 77 year old female  who presents with rib pain. The patient was working in the garage on Friday, when she had a fall. She landed on her butt, on the steps. She states that the recycling bin hit her chest. She initially felt okay on Sunday, but started to have a sharp pain last night in the right side of her chest. She denies fever but has a cough. She has taken naproxen and tylenol for her pain.    Review of External Notes:      ROS:  Review of Systems   Constitutional: Negative for chills and fever.   Respiratory: Positive for cough.    Gastrointestinal: Negative for abdominal pain.   Musculoskeletal:        Rib pain   All other systems reviewed and are negative.    Allergies:  Neurontin [Gabapentin]  Amoxicillin  Clindamycin Hcl     Medications:    amlodipine   halobetasol   levothyroxine  propranolol   naproxen  simvastatin     Past Medical History:    Depression  Blood transfusion  Migraine  OA of right AC joint  Hypothyroidism     Past Surgical History:    Biopsy  Colonoscopy  D and C, operative hysteroscopy with morcellator  Eye surgery  Cholecystectomy  Tubal ligation  Colposcopy      Family History:    family history includes Cancer in her father; Coronary Artery Disease in her father and mother; Diabetes in her brother, brother, brother, brother, sister, and sister; Heart Disease in her father; Osteoporosis in her mother and sister; Other Cancer in her father.    Social History:   reports that she has never smoked. She has never used smokeless tobacco. She reports current alcohol use. She reports that she does not use drugs.  PCP: Gela Bueno     Physical Exam     Patient Vitals for the past 24 hrs:   BP Temp Pulse Resp SpO2   02/14/23 0013 (!) 157/79 -- -- -- --   02/14/23 0011 -- 98.1  F (36.7  C) 89 20 99 %        Physical Exam  Constitutional: Well appearing.  HEENT: Atraumatic.  PERRL.  EOMI.  Moist mucous  membranes.  Neck: Soft.  Supple.   Cardiac: Regular rate and rhythm.  No murmur or rub.  Respiratory: Clear to auscultation bilaterally.  No respiratory distress.  No wheezing, rhonchi, or rales.  Abdomen: Soft and nontender.  Nondistended.  Musculoskeletal: No visible swelling or ecchymosis of the chest wall. No tenderness to palpation. No edema.  Normal range of motion.  Neurologic: Alert and oriented x3.  Normal tone and bulk.  Normal gait.  Skin: No rashes.  No edema.  Psych: Normal affect.  Normal behavior.    Emergency Department Course   Imaging:  Ribs XR, unilat 3 views + PA chest,  left   Final Result   IMPRESSION: The visualized heart and lungs are negative. No rib fractures.        Report per radiology    Emergency Department Course & Assessments:    Independent Interpretation (X-rays, CTs, rhythm strip):  Chest xray without obvious rib fracture, pneumothorax, or infiltrate.    Consultations/Discussion of Management or Tests:  0456 I obtained history and examined patient.    Disposition:  The patient was discharged to home.     Impression & Plan    Medical Decision Making:  .Name is a 77-year-old woman who is afebrile and hemodynamically stable.  On exam she really has no significant tenderness and is standing and walking without difficulty.  Her lungs are clear to auscultation with no hypoxia.  She is hemodynamically stable.  X-ray demonstrated no acute osseous abnormality such as rib fracture or pneumothorax per radiology and my read.  We discussed CT scan of the chest, however, she feels very comfortable not obtaining that at this time given resolution of her symptoms.  We discussed likely soft tissue injury at this time and plan for home with supportive care and close primary care follow-up.  Symptoms very consistent with ACS and I doubt ACS.  Symptoms not consistent with PE with no hypoxia or tachycardia or pleuritic pain.  No evidence of pneumonia.  Discussed very close primary care follow-up,  supportive care at home, and close strict return precautions and she is in agreement her questions were answered.  She was in no distress at time of discharge.    Diagnosis:    ICD-10-CM    1. Injury of chest wall, initial encounter  S29.9XXA            Discharge Medications:  New Prescriptions    No medications on file      Scribe Disclosure:  Chapis CHRISTENSEN Hired, am serving as a scribe at 4:49 AM on 2/14/2023 to document services personally performed by Rajesh Gonzalez MD based on my observations and the provider's statements to me.    2/14/2023   Rajesh Gonzalez MD Salay, Nicholas J, MD  02/20/23 0890

## 2023-02-14 NOTE — ED TRIAGE NOTES
Pt arrives complaining of left side rib pain. Fell on Friday with garbage bin against self. Now hurts to take a deep breath     Triage Assessment     Row Name 02/14/23 0010       Triage Assessment (Adult)    Airway WDL WDL       Respiratory WDL    Respiratory WDL X  hurts to take dep breath       Skin Circulation/Temperature WDL    Skin Circulation/Temperature WDL WDL       Cardiac WDL    Cardiac WDL WDL       Peripheral/Neurovascular WDL    Peripheral Neurovascular WDL WDL       Cognitive/Neuro/Behavioral WDL    Cognitive/Neuro/Behavioral WDL WDL

## 2023-02-15 ENCOUNTER — ALLIED HEALTH/NURSE VISIT (OUTPATIENT)
Dept: FAMILY MEDICINE | Facility: CLINIC | Age: 78
End: 2023-02-15
Payer: COMMERCIAL

## 2023-02-15 DIAGNOSIS — Z01.30 BP CHECK: Primary | ICD-10-CM

## 2023-02-15 PROCEDURE — 99207 PR NO CHARGE NURSE ONLY: CPT | Performed by: INTERNAL MEDICINE

## 2023-02-15 NOTE — PROGRESS NOTES
Abby Casey was evaluated at [facility] on February 15, 2023 at which time her blood pressure was:    BP Readings from Last 1 Encounters:   02/14/23 (!) 157/79     Systolic (Patient Reported): 113 (2/15/2023  2:55 PM)  Diastolic (Patient Reported): 68 (2/15/2023  2:55 PM)        Reviewed lifestyle modifications for blood pressure control and reduction: including making healthy food choices, managing weight, getting regular exercise, smoking cessation, reducing alcohol consumption, monitoring blood pressure regularly.     Symptoms: None    BP Goal:< 140/90 mmHg    BP Assessment:  BP at goal    Potential Reasons for BP too high: NA - Not applicable    BP Follow-Up Plan: Recheck BP in 6 months at pharmacy    Recommendation to Provider: None    Note completed by: Montrell Cantrell RPH    Thank You   [facility]

## 2023-02-20 ASSESSMENT — ENCOUNTER SYMPTOMS
ABDOMINAL PAIN: 0
CHILLS: 0
FEVER: 0

## 2023-03-13 NOTE — PROGRESS NOTES
This patient is coming to lab April 25. HM orders have been placed for you to associate and sign. Please future any further labs you may want, thanks [facility] lab staff.     No

## 2023-04-24 ENCOUNTER — ALLIED HEALTH/NURSE VISIT (OUTPATIENT)
Dept: FAMILY MEDICINE | Facility: CLINIC | Age: 78
End: 2023-04-24
Payer: COMMERCIAL

## 2023-04-24 DIAGNOSIS — Z01.30 BP CHECK: Primary | ICD-10-CM

## 2023-04-24 PROCEDURE — 99207 PR NO CHARGE NURSE ONLY: CPT | Performed by: INTERNAL MEDICINE

## 2023-04-24 NOTE — PROGRESS NOTES
Abby Casey was evaluated at [facility] on April 24, 2023 at which time her blood pressure was:    BP Readings from Last 1 Encounters:   02/14/23 (!) 157/79     Systolic (Patient Reported): 115 (4/24/2023  1:19 PM)  Diastolic (Patient Reported): 62 (4/24/2023  1:19 PM)        Reviewed lifestyle modifications for blood pressure control and reduction: including making healthy food choices, managing weight, getting regular exercise, smoking cessation, reducing alcohol consumption, monitoring blood pressure regularly.     Symptoms: None    BP Goal:< 140/90 mmHg    BP Assessment:  BP at goal    Potential Reasons for BP too high: NA - Not applicable    BP Follow-Up Plan: Recheck BP in 6 months at pharmacy    Recommendation to Provider: continue current medications     Note completed by: Delores Alfonso RPH

## 2023-05-21 ENCOUNTER — HEALTH MAINTENANCE LETTER (OUTPATIENT)
Age: 78
End: 2023-05-21

## 2023-06-25 ENCOUNTER — OFFICE VISIT (OUTPATIENT)
Dept: URGENT CARE | Facility: URGENT CARE | Age: 78
End: 2023-06-25
Payer: COMMERCIAL

## 2023-06-25 VITALS
HEART RATE: 91 BPM | DIASTOLIC BLOOD PRESSURE: 79 MMHG | TEMPERATURE: 98.7 F | SYSTOLIC BLOOD PRESSURE: 171 MMHG | OXYGEN SATURATION: 97 % | WEIGHT: 159 LBS | RESPIRATION RATE: 16 BRPM | BODY MASS INDEX: 29.08 KG/M2

## 2023-06-25 DIAGNOSIS — H61.22 IMPACTED CERUMEN OF LEFT EAR: Primary | ICD-10-CM

## 2023-06-25 PROCEDURE — 69209 REMOVE IMPACTED EAR WAX UNI: CPT | Mod: LT | Performed by: PHYSICIAN ASSISTANT

## 2023-06-25 NOTE — PROGRESS NOTES
Patient presents with:  Ear Problem: Plugged left ear       (H61.22) Impacted cerumen of left ear  (primary encounter diagnosis)  Comment:   Plan: ND REMOVAL IMPACTED CERUMEN IRRIGATION/LVG         UNILAT              SUBJECTIVE:   Abby Casey is a 78 year old female who presents today with left ear feeling plugged.  Denies any trauma.  Denies any drainage.  Is otherwise in her usual state of health.      Past Medical History:   Diagnosis Date     Depressive disorder sept/oct 2015    situational depression     History of blood transfusion 1980    Uncertain about transfusion     Migraine, unspecified, without mention of intractable migraine without mention of status migrainosus      Osteoarthritis of right acromioclavicular joint      Other and unspecified hyperlipidemia 1990's    Pravachol, Zocor  Formulary issues, 6/02 Advicor     Unspecified hypothyroidism 1980's     Unspecified noninflammatory disorder of cervix     Atypical cervix - colposcopy 10/99         Current Outpatient Medications   Medication Sig Dispense Refill     Multiple Vitamins-Iron (DAILY-JAROD/IRON/BETA-CAROTENE) TABS TAKE 1 TABLET BY MOUTH DAILY. (Patient not taking: Reported on 10/19/2020) 30 tablet 7     Social History     Tobacco Use     Smoking status: Never Smoker     Smokeless tobacco: Never Used   Substance Use Topics     Alcohol use: Not on file     Family History   Problem Relation Age of Onset     Diabetes Mother      Diabetes Father          ROS:    10 point ROS of systems including Constitutional, Eyes, Respiratory, Cardiovascular, Gastroenterology, Genitourinary, Integumentary, Muscularskeletal, Psychiatric ,neurological were all negative except for pertinent positives noted in my HPI       OBJECTIVE:  BP (!) 171/79 (BP Location: Right arm, Patient Position: Sitting, Cuff Size: Adult Regular)   Pulse 91   Temp 98.7  F (37.1  C) (Oral)   Resp 16   Wt 72.1 kg (159 lb)   LMP  (LMP Unknown)   SpO2 97%   BMI 29.08 kg/m     Physical Exam:  GENERAL APPEARANCE: healthy, alert and no distress  EYES: EOMI,  PERRL, conjunctiva clear  Prior to tenderness the left ear canal is completely filled with soft brown cerumen.  After ear lavage by nursing the ear exam is as follows:  HENT: ear canals and TM's normal.  Nose and mouth without ulcers, erythema or lesions  SKIN: no suspicious lesions or rashes

## 2023-07-21 DIAGNOSIS — I10 ESSENTIAL HYPERTENSION: ICD-10-CM

## 2023-07-25 NOTE — TELEPHONE ENCOUNTER
Routing refill request to provider for review/approval because:  Bp above parameters for amlodipine    Appointments in Next Year      Aug 08, 2023  8:00 AM  LAB with  LAB  [facility] ([facility] ) [phone]     Aug 15, 2023 12:20 PM  (Arrive by 12:00 PM)  MEDICARE ANNUAL WELLNESS VISIT with Gela Bueno MD  [facility] ([facility] ) [phone]

## 2023-07-26 RX ORDER — AMLODIPINE BESYLATE 2.5 MG/1
2.5 TABLET ORAL DAILY
Qty: 90 TABLET | Refills: 0 | Status: SHIPPED | OUTPATIENT
Start: 2023-07-26 | End: 2023-08-23

## 2023-08-08 ENCOUNTER — LAB (OUTPATIENT)
Dept: LAB | Facility: CLINIC | Age: 78
End: 2023-08-08
Payer: COMMERCIAL

## 2023-08-08 DIAGNOSIS — R73.09 ELEVATED GLUCOSE: ICD-10-CM

## 2023-08-08 DIAGNOSIS — E78.5 HYPERLIPIDEMIA LDL GOAL <130: ICD-10-CM

## 2023-08-08 DIAGNOSIS — E03.9 HYPOTHYROIDISM, UNSPECIFIED TYPE: ICD-10-CM

## 2023-08-08 DIAGNOSIS — Z00.00 ENCOUNTER FOR MEDICARE ANNUAL WELLNESS EXAM: ICD-10-CM

## 2023-08-08 LAB
ALBUMIN SERPL BCG-MCNC: 4.1 G/DL (ref 3.5–5.2)
ALP SERPL-CCNC: 83 U/L (ref 35–104)
ALT SERPL W P-5'-P-CCNC: 16 U/L (ref 0–50)
ANION GAP SERPL CALCULATED.3IONS-SCNC: 10 MMOL/L (ref 7–15)
AST SERPL W P-5'-P-CCNC: 23 U/L (ref 0–45)
BILIRUB SERPL-MCNC: 0.5 MG/DL
BUN SERPL-MCNC: 15.1 MG/DL (ref 8–23)
CALCIUM SERPL-MCNC: 9.3 MG/DL (ref 8.8–10.2)
CHLORIDE SERPL-SCNC: 106 MMOL/L (ref 98–107)
CHOLEST SERPL-MCNC: 163 MG/DL
CREAT SERPL-MCNC: 0.66 MG/DL (ref 0.51–0.95)
DEPRECATED HCO3 PLAS-SCNC: 26 MMOL/L (ref 22–29)
GFR SERPL CREATININE-BSD FRML MDRD: 90 ML/MIN/1.73M2
GLUCOSE SERPL-MCNC: 134 MG/DL (ref 70–99)
HBA1C MFR BLD: 6.1 % (ref 0–5.6)
HDLC SERPL-MCNC: 45 MG/DL
LDLC SERPL CALC-MCNC: 92 MG/DL
NONHDLC SERPL-MCNC: 118 MG/DL
POTASSIUM SERPL-SCNC: 4.4 MMOL/L (ref 3.4–5.3)
PROT SERPL-MCNC: 6.4 G/DL (ref 6.4–8.3)
SODIUM SERPL-SCNC: 142 MMOL/L (ref 136–145)
TRIGL SERPL-MCNC: 132 MG/DL
TSH SERPL DL<=0.005 MIU/L-ACNC: 1.08 UIU/ML (ref 0.3–4.2)

## 2023-08-08 PROCEDURE — 80053 COMPREHEN METABOLIC PANEL: CPT

## 2023-08-08 PROCEDURE — 36415 COLL VENOUS BLD VENIPUNCTURE: CPT

## 2023-08-08 PROCEDURE — 80061 LIPID PANEL: CPT

## 2023-08-08 PROCEDURE — 83036 HEMOGLOBIN GLYCOSYLATED A1C: CPT

## 2023-08-08 PROCEDURE — 84443 ASSAY THYROID STIM HORMONE: CPT

## 2023-08-16 ASSESSMENT — ENCOUNTER SYMPTOMS
DIZZINESS: 0
ABDOMINAL PAIN: 0
HEADACHES: 0
FREQUENCY: 1
HEMATOCHEZIA: 0
CHILLS: 0
JOINT SWELLING: 0
HEARTBURN: 0
BREAST MASS: 0
COUGH: 0
SORE THROAT: 0
HEMATURIA: 0
ARTHRALGIAS: 0
CONSTIPATION: 0
DYSURIA: 0
PALPITATIONS: 0
NAUSEA: 0
DIARRHEA: 0
FEVER: 0
MYALGIAS: 0
WEAKNESS: 0
EYE PAIN: 0
NERVOUS/ANXIOUS: 0
PARESTHESIAS: 0
SHORTNESS OF BREATH: 0

## 2023-08-16 ASSESSMENT — ACTIVITIES OF DAILY LIVING (ADL): CURRENT_FUNCTION: NO ASSISTANCE NEEDED

## 2023-08-23 ENCOUNTER — OFFICE VISIT (OUTPATIENT)
Dept: FAMILY MEDICINE | Facility: CLINIC | Age: 78
End: 2023-08-23
Payer: COMMERCIAL

## 2023-08-23 VITALS
OXYGEN SATURATION: 96 % | DIASTOLIC BLOOD PRESSURE: 68 MMHG | HEIGHT: 61 IN | BODY MASS INDEX: 29.47 KG/M2 | WEIGHT: 156.1 LBS | TEMPERATURE: 98.2 F | RESPIRATION RATE: 15 BRPM | HEART RATE: 79 BPM | SYSTOLIC BLOOD PRESSURE: 132 MMHG

## 2023-08-23 DIAGNOSIS — I10 ESSENTIAL HYPERTENSION: ICD-10-CM

## 2023-08-23 DIAGNOSIS — Z12.31 VISIT FOR SCREENING MAMMOGRAM: ICD-10-CM

## 2023-08-23 DIAGNOSIS — E78.5 HYPERLIPIDEMIA LDL GOAL <130: ICD-10-CM

## 2023-08-23 DIAGNOSIS — E03.9 HYPOTHYROIDISM, UNSPECIFIED TYPE: ICD-10-CM

## 2023-08-23 DIAGNOSIS — Z00.00 ENCOUNTER FOR MEDICARE ANNUAL WELLNESS EXAM: Primary | ICD-10-CM

## 2023-08-23 DIAGNOSIS — F43.22 ADJUSTMENT DISORDER WITH ANXIOUS MOOD: ICD-10-CM

## 2023-08-23 DIAGNOSIS — M85.80 OSTEOPENIA, UNSPECIFIED LOCATION: ICD-10-CM

## 2023-08-23 DIAGNOSIS — M85.88 OTHER SPECIFIED DISORDERS OF BONE DENSITY AND STRUCTURE, OTHER SITE: ICD-10-CM

## 2023-08-23 PROCEDURE — G0439 PPPS, SUBSEQ VISIT: HCPCS | Performed by: INTERNAL MEDICINE

## 2023-08-23 PROCEDURE — 99214 OFFICE O/P EST MOD 30 MIN: CPT | Mod: 25 | Performed by: INTERNAL MEDICINE

## 2023-08-23 RX ORDER — PROPRANOLOL HYDROCHLORIDE 10 MG/1
10 TABLET ORAL 2 TIMES DAILY PRN
Qty: 60 TABLET | Refills: 1 | Status: SHIPPED | OUTPATIENT
Start: 2023-08-23 | End: 2024-09-05

## 2023-08-23 RX ORDER — SIMVASTATIN 40 MG
40 TABLET ORAL AT BEDTIME
Qty: 90 TABLET | Refills: 4 | Status: SHIPPED | OUTPATIENT
Start: 2023-08-23 | End: 2024-09-05 | Stop reason: DRUGHIGH

## 2023-08-23 RX ORDER — AMLODIPINE BESYLATE 2.5 MG/1
2.5 TABLET ORAL DAILY
Qty: 90 TABLET | Refills: 4 | Status: SHIPPED | OUTPATIENT
Start: 2023-08-23 | End: 2024-09-05

## 2023-08-23 RX ORDER — LEVOTHYROXINE SODIUM 100 UG/1
100 TABLET ORAL DAILY
Qty: 90 TABLET | Refills: 4 | Status: SHIPPED | OUTPATIENT
Start: 2023-08-23 | End: 2024-09-06

## 2023-08-23 ASSESSMENT — ENCOUNTER SYMPTOMS
FEVER: 0
CHILLS: 0
ARTHRALGIAS: 0
HEARTBURN: 0
MYALGIAS: 0
ABDOMINAL PAIN: 0
HEMATURIA: 0
DYSURIA: 0
HEMATOCHEZIA: 0
DIARRHEA: 0
JOINT SWELLING: 0
EYE PAIN: 0
PARESTHESIAS: 0
COUGH: 0
CONSTIPATION: 0
PALPITATIONS: 0
WEAKNESS: 0
NAUSEA: 0
DIZZINESS: 0
NERVOUS/ANXIOUS: 0
FREQUENCY: 1
BREAST MASS: 0
SHORTNESS OF BREATH: 0
HEADACHES: 0
SORE THROAT: 0

## 2023-08-23 ASSESSMENT — ANXIETY QUESTIONNAIRES
3. WORRYING TOO MUCH ABOUT DIFFERENT THINGS: NOT AT ALL
GAD7 TOTAL SCORE: 2
1. FEELING NERVOUS, ANXIOUS, OR ON EDGE: NOT AT ALL
IF YOU CHECKED OFF ANY PROBLEMS ON THIS QUESTIONNAIRE, HOW DIFFICULT HAVE THESE PROBLEMS MADE IT FOR YOU TO DO YOUR WORK, TAKE CARE OF THINGS AT HOME, OR GET ALONG WITH OTHER PEOPLE: NOT DIFFICULT AT ALL
2. NOT BEING ABLE TO STOP OR CONTROL WORRYING: NOT AT ALL
6. BECOMING EASILY ANNOYED OR IRRITABLE: SEVERAL DAYS
5. BEING SO RESTLESS THAT IT IS HARD TO SIT STILL: SEVERAL DAYS
7. FEELING AFRAID AS IF SOMETHING AWFUL MIGHT HAPPEN: NOT AT ALL
GAD7 TOTAL SCORE: 2
4. TROUBLE RELAXING: NOT AT ALL

## 2023-08-23 ASSESSMENT — ACTIVITIES OF DAILY LIVING (ADL): CURRENT_FUNCTION: NO ASSISTANCE NEEDED

## 2023-08-23 ASSESSMENT — PAIN SCALES - GENERAL: PAINLEVEL: NO PAIN (0)

## 2023-08-23 NOTE — PATIENT INSTRUCTIONS
Patient Education   Personalized Prevention Plan  You are due for the preventive services outlined below.  Your care team is available to assist you in scheduling these services.  If you have already completed any of these items, please share that information with your care team to update in your medical record.  Health Maintenance Due   Topic Date Due    ANNUAL REVIEW OF HM ORDERS  04/21/2022    COVID-19 Vaccine (6 - Moderna series) 02/12/2023    Mammogram  03/01/2023    Annual Wellness Visit  07/28/2023          Immunizations to consider:  COVID Booster  Flu Shot  RSV    Can be obtained at the pharmacy.

## 2023-08-30 ENCOUNTER — HOSPITAL ENCOUNTER (OUTPATIENT)
Dept: MAMMOGRAPHY | Facility: CLINIC | Age: 78
Discharge: HOME | End: 2023-08-30
Attending: INTERNAL MEDICINE | Admitting: INTERNAL MEDICINE
Payer: COMMERCIAL

## 2023-08-30 DIAGNOSIS — Z12.31 VISIT FOR SCREENING MAMMOGRAM: ICD-10-CM

## 2023-08-30 PROCEDURE — 77067 SCR MAMMO BI INCL CAD: CPT

## 2023-09-14 ENCOUNTER — HOSPITAL ENCOUNTER (EMERGENCY)
Facility: CLINIC | Age: 78
Discharge: HOME | End: 2023-09-14
Attending: STUDENT IN AN ORGANIZED HEALTH CARE EDUCATION/TRAINING PROGRAM | Admitting: STUDENT IN AN ORGANIZED HEALTH CARE EDUCATION/TRAINING PROGRAM
Payer: COMMERCIAL

## 2023-09-14 VITALS
OXYGEN SATURATION: 97 % | HEART RATE: 89 BPM | SYSTOLIC BLOOD PRESSURE: 144 MMHG | DIASTOLIC BLOOD PRESSURE: 83 MMHG | RESPIRATION RATE: 20 BRPM | TEMPERATURE: 98 F

## 2023-09-14 DIAGNOSIS — R10.13 EPIGASTRIC PAIN: ICD-10-CM

## 2023-09-14 DIAGNOSIS — R11.2 NAUSEA AND VOMITING, UNSPECIFIED VOMITING TYPE: ICD-10-CM

## 2023-09-14 LAB
ALBUMIN SERPL BCG-MCNC: 4.5 G/DL (ref 3.5–5.2)
ALP SERPL-CCNC: 97 U/L (ref 35–104)
ALT SERPL W P-5'-P-CCNC: 19 U/L (ref 0–50)
ANION GAP SERPL CALCULATED.3IONS-SCNC: 11 MMOL/L (ref 7–15)
AST SERPL W P-5'-P-CCNC: 25 U/L (ref 0–45)
BASOPHILS # BLD AUTO: 0.1 10E3/UL (ref 0–0.2)
BASOPHILS NFR BLD AUTO: 1 %
BILIRUB SERPL-MCNC: 0.4 MG/DL
BUN SERPL-MCNC: 10.9 MG/DL (ref 8–23)
CALCIUM SERPL-MCNC: 9.6 MG/DL (ref 8.8–10.2)
CHLORIDE SERPL-SCNC: 99 MMOL/L (ref 98–107)
CREAT SERPL-MCNC: 0.59 MG/DL (ref 0.51–0.95)
DEPRECATED HCO3 PLAS-SCNC: 27 MMOL/L (ref 22–29)
EGFRCR SERPLBLD CKD-EPI 2021: >90 ML/MIN/1.73M2
EOSINOPHIL # BLD AUTO: 0.1 10E3/UL (ref 0–0.7)
EOSINOPHIL NFR BLD AUTO: 1 %
ERYTHROCYTE [DISTWIDTH] IN BLOOD BY AUTOMATED COUNT: 12.4 % (ref 10–15)
GLUCOSE SERPL-MCNC: 138 MG/DL (ref 70–99)
HCT VFR BLD AUTO: 45.1 % (ref 35–47)
HGB BLD-MCNC: 14.9 G/DL (ref 11.7–15.7)
HOLD SPECIMEN: NORMAL
HOLD SPECIMEN: NORMAL
IMM GRANULOCYTES # BLD: 0.1 10E3/UL
IMM GRANULOCYTES NFR BLD: 0 %
LIPASE SERPL-CCNC: 30 U/L (ref 13–60)
LYMPHOCYTES # BLD AUTO: 1.9 10E3/UL (ref 0.8–5.3)
LYMPHOCYTES NFR BLD AUTO: 15 %
MCH RBC QN AUTO: 31.3 PG (ref 26.5–33)
MCHC RBC AUTO-ENTMCNC: 33 G/DL (ref 31.5–36.5)
MCV RBC AUTO: 95 FL (ref 78–100)
MONOCYTES # BLD AUTO: 1 10E3/UL (ref 0–1.3)
MONOCYTES NFR BLD AUTO: 7 %
NEUTROPHILS # BLD AUTO: 9.8 10E3/UL (ref 1.6–8.3)
NEUTROPHILS NFR BLD AUTO: 76 %
NRBC # BLD AUTO: 0 10E3/UL
NRBC BLD AUTO-RTO: 0 /100
PLATELET # BLD AUTO: 248 10E3/UL (ref 150–450)
POTASSIUM SERPL-SCNC: 4.1 MMOL/L (ref 3.4–5.3)
PROT SERPL-MCNC: 7 G/DL (ref 6.4–8.3)
RBC # BLD AUTO: 4.76 10E6/UL (ref 3.8–5.2)
SODIUM SERPL-SCNC: 137 MMOL/L (ref 136–145)
WBC # BLD AUTO: 12.9 10E3/UL (ref 4–11)

## 2023-09-14 PROCEDURE — 250N000013 HC RX MED GY IP 250 OP 250 PS 637: Performed by: STUDENT IN AN ORGANIZED HEALTH CARE EDUCATION/TRAINING PROGRAM

## 2023-09-14 PROCEDURE — 83690 ASSAY OF LIPASE: CPT | Performed by: STUDENT IN AN ORGANIZED HEALTH CARE EDUCATION/TRAINING PROGRAM

## 2023-09-14 PROCEDURE — 36415 COLL VENOUS BLD VENIPUNCTURE: CPT | Performed by: EMERGENCY MEDICINE

## 2023-09-14 PROCEDURE — 99284 EMERGENCY DEPT VISIT MOD MDM: CPT

## 2023-09-14 PROCEDURE — 85025 COMPLETE CBC W/AUTO DIFF WBC: CPT | Performed by: EMERGENCY MEDICINE

## 2023-09-14 PROCEDURE — 80053 COMPREHEN METABOLIC PANEL: CPT | Performed by: STUDENT IN AN ORGANIZED HEALTH CARE EDUCATION/TRAINING PROGRAM

## 2023-09-14 PROCEDURE — 85025 COMPLETE CBC W/AUTO DIFF WBC: CPT | Performed by: STUDENT IN AN ORGANIZED HEALTH CARE EDUCATION/TRAINING PROGRAM

## 2023-09-14 PROCEDURE — 80053 COMPREHEN METABOLIC PANEL: CPT | Performed by: EMERGENCY MEDICINE

## 2023-09-14 PROCEDURE — 250N000011 HC RX IP 250 OP 636: Performed by: STUDENT IN AN ORGANIZED HEALTH CARE EDUCATION/TRAINING PROGRAM

## 2023-09-14 RX ORDER — ONDANSETRON 4 MG/1
4 TABLET, ORALLY DISINTEGRATING ORAL ONCE
Status: COMPLETED | OUTPATIENT
Start: 2023-09-14 | End: 2023-09-14

## 2023-09-14 RX ORDER — ALUMINA, MAGNESIA, AND SIMETHICONE 2400; 2400; 240 MG/30ML; MG/30ML; MG/30ML
30 SUSPENSION ORAL EVERY 4 HOURS PRN
Qty: 100 ML | Refills: 0 | Status: SHIPPED | OUTPATIENT
Start: 2023-09-14 | End: 2023-10-14

## 2023-09-14 RX ORDER — ONDANSETRON 4 MG/1
4 TABLET, ORALLY DISINTEGRATING ORAL EVERY 8 HOURS PRN
Qty: 10 TABLET | Refills: 0 | Status: SHIPPED | OUTPATIENT
Start: 2023-09-14 | End: 2023-09-17

## 2023-09-14 RX ORDER — MAGNESIUM HYDROXIDE/ALUMINUM HYDROXICE/SIMETHICONE 120; 1200; 1200 MG/30ML; MG/30ML; MG/30ML
15 SUSPENSION ORAL ONCE
Status: COMPLETED | OUTPATIENT
Start: 2023-09-14 | End: 2023-09-14

## 2023-09-14 RX ADMIN — ALUMINUM HYDROXIDE, MAGNESIUM HYDROXIDE, AND SIMETHICONE 15 ML: 200; 200; 20 SUSPENSION ORAL at 16:04

## 2023-09-14 RX ADMIN — ONDANSETRON 4 MG: 4 TABLET, ORALLY DISINTEGRATING ORAL at 16:04

## 2023-09-14 ASSESSMENT — ACTIVITIES OF DAILY LIVING (ADL): ADLS_ACUITY_SCORE: 35

## 2023-09-14 NOTE — ED PROVIDER NOTES
History     Chief Complaint:  Abdominal Pain       The history is provided by the patient.      Abby Casey is a 78 year old female who presents with epigastric abdominal pain and nausea. Patient states that a couple of days ago, she drank two glasses of wine which is not typical for her and she felt sick the next day. Symtoms progressed today and she was unable to sleep last night. She says that the abdominal pain was worse with deep breaths throughout the night, though she has not experienced this today. No vomiting, fever, or chills. She denies hematuria, dysuria, or urinary frequency. No diarrhea, black or bloody stools, or new onset of constipation. This feel similar to abdominal pain she has experienced in the past. Pepto Bismol has helped for previous episodes, though she says it was not relieving today. She has not eaten today and has drank minimal water. No personal history of reflux. Positive surgical history of cholecystectomy.        Independent Historian:   None - Patient Only    Review of External Notes:   None.    Medications:    Omeprazole   Amlodipine   Levothyroxine   Propranolol   Simvastatin     Past Medical History:    Depressive disorder  Migraines   Osteoarthritis of right acromioclavicular joint  Hyperlipidemia   Hypothyroidism    Past Surgical History:    D&C   Hysterectomy   Endometrial polypectomy   Bilateral cataract extraction   Cholecystectomy   Tubal ligation      Physical Exam   Patient Vitals for the past 24 hrs:   BP Temp Temp src Pulse Resp SpO2   09/14/23 1659 (!) 144/83 -- -- 89 -- 97 %   09/14/23 0933 (!) 138/95 98  F (36.7  C) Temporal 106 20 100 %        Physical Exam  Vitals and nursing note reviewed.   Constitutional:       General: She is not in acute distress.     Appearance: She is not ill-appearing or diaphoretic.   Cardiovascular:      Rate and Rhythm: Normal rate and regular rhythm.   Pulmonary:      Effort: Pulmonary effort is normal.   Abdominal:      General:  Abdomen is flat.      Palpations: Abdomen is soft.      Tenderness: There is no abdominal tenderness. There is no guarding or rebound.   Skin:     General: Skin is warm and dry.      Coloration: Skin is not jaundiced or pale.   Neurological:      Mental Status: She is alert and oriented to person, place, and time.           Emergency Department Course   ECG  None performed    Imaging:  None performed    Laboratory:  Labs Ordered and Resulted from Time of ED Arrival to Time of ED Departure   COMPREHENSIVE METABOLIC PANEL - Abnormal       Result Value    Sodium 137      Potassium 4.1      Chloride 99      Carbon Dioxide (CO2) 27      Anion Gap 11      Urea Nitrogen 10.9      Creatinine 0.59      Calcium 9.6      Glucose 138 (*)     Alkaline Phosphatase 97      AST 25      ALT 19      Protein Total 7.0      Albumin 4.5      Bilirubin Total 0.4      GFR Estimate >90     CBC WITH PLATELETS AND DIFFERENTIAL - Abnormal    WBC Count 12.9 (*)     RBC Count 4.76      Hemoglobin 14.9      Hematocrit 45.1      MCV 95      MCH 31.3      MCHC 33.0      RDW 12.4      Platelet Count 248      % Neutrophils 76      % Lymphocytes 15      % Monocytes 7      % Eosinophils 1      % Basophils 1      % Immature Granulocytes 0      NRBCs per 100 WBC 0      Absolute Neutrophils 9.8 (*)     Absolute Lymphocytes 1.9      Absolute Monocytes 1.0      Absolute Eosinophils 0.1      Absolute Basophils 0.1      Absolute Immature Granulocytes 0.1      Absolute NRBCs 0.0     LIPASE - Normal    Lipase 30        Procedures   None performed       Emergency Department Course & Assessments:     Interventions:  Medications   ondansetron (ZOFRAN ODT) ODT tab 4 mg (4 mg Oral $Given 9/14/23 1604)   alum & mag hydroxide-simethicone (MAALOX) suspension 15 mL (15 mLs Oral $Given 9/14/23 1604)        Assessments:  1552 I obtained history and examined the patient as noted above.   1701 I rechecked and updated the patient. Patient is feeling much better and is safe  for discharge.     Independent Interpretation (X-rays, CTs, rhythm strip):  None    Consultations/Discussion of Management or Tests:  None    Social Determinants of Health affecting care:   None.      Disposition:  The patient was discharged to home.     Impression & Plan     Medical Decision Making:     Patient presenting with 1 day of nausea vomiting, with episodic epigastric pain overnight.  Vital signs reassuring on my evaluation.  Considered differential including gastritis, peptic ulcer disease, gallbladder or hepatic pathology, among others.  Work-up is reassuring.  No evidence of pancreatitis.  No evidence of gallbladder or hepatic pathology on CMP.  Patient does have a mild leukocytosis but this could be explained by the patient's symptoms.  She did have no abdominal tenderness to palpation on exam.  I did discuss getting imaging of the patient's abdomen with the patient.  Since she felt symptoms had resolved after taking Zofran and Maalox, she wished to defer imaging.  I feel this is reasonable given her reassuring exam and findings today.  We will plan for follow-up with primary care physician for further evaluation, prescription of Zofran for recurrent nausea, start omeprazole and Maalox as needed for possible gastritis/peptic ulcer disease. Findings were discussed. Additional verbal instructions were provided. I discussed specific warning signs and instructed the patient to return to the ED if there are any concerns. Understanding of instructions was voiced, questions were answered and the patient was discharged.      Critical Care time: was 0 minutes for this patient excluding procedures.      Diagnosis:    ICD-10-CM    1. Nausea and vomiting, unspecified vomiting type  R11.2       2. Epigastric pain  R10.13            Discharge Medications:  Discharge Medication List as of 9/14/2023  5:06 PM        START taking these medications    Details   alum & mag hydroxide-simethicone (MAALOX MAX) 400-400-40  MG/5ML SUSP suspension Take 30 mLs by mouth every 4 hours as needed for indigestion, Disp-100 mL, R-0, E-Prescribe      omeprazole (PRILOSEC) 20 MG DR capsule Take 2 capsules (40 mg) by mouth daily for 30 days, Disp-60 capsule, R-0, E-Prescribe      ondansetron (ZOFRAN ODT) 4 MG ODT tab Take 1 tablet (4 mg) by mouth every 8 hours as needed for nausea, Disp-10 tablet, R-0, E-Prescribe              9/14/2023   Antolin Aguilar MD  09/14/23 5999

## 2023-09-14 NOTE — DISCHARGE INSTRUCTIONS
Thank you for allowing us to evaluate you today.    Follow up with your primary care provider in 1 week for reevaluation.     Take the omeprazole daily as prescribed.  Use Maalox as needed for pain in your upper abdomen.  Take Zofran as needed for nausea.  Please read the guidance provided with your discharge instructions.    Immediately return to the emergency department with any concerns.

## 2023-09-14 NOTE — ED TRIAGE NOTES
Pt arrives with nausea and abd pain since yesterday. Unable to tolerate oral intake due to nausea. Denies vomiting/diarrhea. Denies urinary sx. ABCs intact.     BP (!) 138/95   Pulse 106   Temp 98  F (36.7  C) (Temporal)   Resp (!) 106   LMP  (LMP Unknown)   SpO2 100%        Triage Assessment       Row Name 09/14/23 0934       Triage Assessment (Adult)    Airway WDL WDL       Respiratory WDL    Respiratory WDL WDL       Cardiac WDL    Cardiac WDL WDL       Cognitive/Neuro/Behavioral WDL    Cognitive/Neuro/Behavioral WDL WDL

## 2023-09-22 ENCOUNTER — E-VISIT (OUTPATIENT)
Dept: FAMILY MEDICINE | Facility: CLINIC | Age: 78
End: 2023-09-22
Payer: COMMERCIAL

## 2023-09-22 DIAGNOSIS — R19.7 DIARRHEA, UNSPECIFIED TYPE: Primary | ICD-10-CM

## 2023-09-22 PROCEDURE — 99207 PR NON-BILLABLE SERV PER CHARTING: CPT | Performed by: INTERNAL MEDICINE

## 2023-10-12 ENCOUNTER — ANCILLARY PROCEDURE (OUTPATIENT)
Dept: BONE DENSITY | Facility: CLINIC | Age: 78
End: 2023-10-12
Attending: INTERNAL MEDICINE
Payer: COMMERCIAL

## 2023-10-12 DIAGNOSIS — M85.80 OSTEOPENIA, UNSPECIFIED LOCATION: ICD-10-CM

## 2023-10-12 DIAGNOSIS — M85.88 OTHER SPECIFIED DISORDERS OF BONE DENSITY AND STRUCTURE, OTHER SITE: ICD-10-CM

## 2023-10-12 PROCEDURE — 77085 DXA BONE DENSITY AXL VRT FX: CPT | Performed by: INTERNAL MEDICINE

## 2023-11-09 ENCOUNTER — OFFICE VISIT (OUTPATIENT)
Dept: FAMILY MEDICINE | Facility: CLINIC | Age: 78
End: 2023-11-09
Payer: COMMERCIAL

## 2023-11-09 VITALS
SYSTOLIC BLOOD PRESSURE: 140 MMHG | OXYGEN SATURATION: 98 % | HEART RATE: 81 BPM | TEMPERATURE: 97.9 F | WEIGHT: 156.6 LBS | RESPIRATION RATE: 17 BRPM | DIASTOLIC BLOOD PRESSURE: 70 MMHG | BODY MASS INDEX: 29.57 KG/M2 | HEIGHT: 61 IN

## 2023-11-09 DIAGNOSIS — I10 ESSENTIAL HYPERTENSION: ICD-10-CM

## 2023-11-09 DIAGNOSIS — M85.80 OSTEOPENIA, UNSPECIFIED LOCATION: Primary | ICD-10-CM

## 2023-11-09 PROCEDURE — 99214 OFFICE O/P EST MOD 30 MIN: CPT | Performed by: INTERNAL MEDICINE

## 2023-11-09 RX ORDER — RESPIRATORY SYNCYTIAL VIRUS VACCINE 120MCG/0.5
0.5 KIT INTRAMUSCULAR ONCE
Qty: 1 EACH | Refills: 0 | Status: CANCELLED | OUTPATIENT
Start: 2023-11-09 | End: 2023-11-09

## 2023-11-09 ASSESSMENT — PAIN SCALES - GENERAL: PAINLEVEL: NO PAIN (0)

## 2023-11-09 NOTE — PROGRESS NOTES
Assessment & Plan     (M85.80) Osteopenia, unspecified location  (primary encounter diagnosis)  Comment: revewied DEXA from 10/12/2023 and compared with 2020; plans to increase calcium and Vit D3; she will also recommit to exercise.  Plan: ongoing assessment of lifestyle changes to help with bone health; anticipate reassess DEXA in 2 years.     (I10) Essential hypertension  Comment: Pt on Amlodipine; recheck BP, slightly better; continue Amliodipine, no change in medication.  Plan: periodic monitoring of BLOOD PRESSURE; continue current medications.       30 minutes spent by me on the date of the encounter doing chart review, history and exam, documentation and further activities per the note    Gela Bueno MD  Internal Medicine  [facility] LANI Mora is a 78 year old, presenting for the following health issues:  Follow Up (DEXA scan)  Hypertension- pt on Amlodipine; Propranolol for anxiety not used consistently.        2023    12:42 PM   Additional Questions   Roomed by Anayeli SEBASTIAN       History of Present Illness       Reason for visit:  Dr. Bueno requested to discuss DEXA results    She eats 2-3 servings of fruits and vegetables daily.She consumes 0 sweetened beverage(s) daily.She exercises with enough effort to increase her heart rate 10 to 19 minutes per day.  She exercises with enough effort to increase her heart rate 3 or less days per week.   She is taking medications regularly.       10/12/2023 DEXA compared with 2020;  she has not been on meds for bone health.       Study Result    Narrative & Impression   BONE DENSITOMETRY  [facility] [address] [place] [address]  10/12/2023      PATIENT: Abby Casey  CHART: [id number]   :  1945  AGE:  78 year old  SEX:  female   REFERRING PROVIDER:  Gela Bueno MD      PROCEDURE:  Bone density scanning was performed using DXA  technology of the lumbar spine and hip.  Scanning was performed on a Lunar Prodigy scanner.  Reporting is completed in the form of a T-score.  The T-score represents the standard deviation from peak bone mass based on a young healthy adult.     REFERENCE T-SCORES:       Normal                -1.0 and greater                                 Osteopenia         Between -1.0 and -2.5                                           Osteoporosis     -2.5 and less                                       RISK FACTORS:  Post-menopausal, Parent history of osteoporosis, follow up Low Bone Density (osteopenia)     CURRENT TREATMENT:  None listed     FINDINGS:               Lumbar Spine (L1-L4)      T-score:  -0.2, marked degenerative changes present                Left Femoral Neck            T-score:  -1.8               Right Femoral Neck          T-score:  -2.0                              Lumbar (L1-L4) BMD: 1.168  Previous: 1.187                           Left Total Hip BMD: 0.866  Previous: 0.910, the right total hip was not included on the previous study so only the left hips are compared     Comparison is made to another DXA performed on the same Lunar Prodigy  machine on 7/31/2020.          LATERAL VERTEBRAL ASSESSMENT  Procedure:  Vertebral fracture assessment was performed in the lateral decubitus position using a Lunar Prodigy  densitometer.  Indications for VFA: T-score of -1.0 or worse and age (female>69)  Confounding factors for VFA: Arthritis/degenerative disc disease.  The LVA scan is interpretable from T7 to L3.     VFA Findings: Using the semi-quantitative analysis of [place] there was evidence of no spinal deformity  VFA Impression: Abby Casey has no vertebral fractures identified on the VFA.      IMPRESSION  Low Bone Density (osteopenia).  Degenerative changes at the lumbar spine may falsely elevate results.      There has been no significant change in bone density of the lumbar spine. There has been a trend  "toward significant decrease in bone density of the hip(s).      Recommendations include ensuring adequate Calcium and Vitamin D.     The current NOF Guidelines recommend treatment for patients with prior hip or vertebral fracture, T-score -2.5 or below, or 10 year risk of any major osteoporotic fracture 20% or greater, or 10 year risk of hip fracture 3% or greater as calculated using the FRAX calculator (www.shef.ac.uk/FRAX or you can google \"FRAX\").    This patient's risks based on available information, with the use of FRAX, are 14 % for major osteoporotic fracture and 3.9 % for hip fracture.   Based on these guidelines, treatment (in addition to calcium and vitamin D) is recommended for this patient, after ruling out other causes of osteoporosis.  This is meant as an aid to clinical decision making; one must still use clinical judgement.     Follow up can be considered in 2 years.   ___________________  Tiera Sharp M.D.  Electronically signed             Review of Systems   CONSTITUTIONAL: NEGATIVE for fever, chills, change in weight  RESP: NEGATIVE for significant cough or SOB  CV: NEGATIVE for chest pain, palpitations or peripheral edema and reviewed elevated BLOOD PRESSURE; she is taking Amlodipine on a daily basis.  GI: NEGATIVE for nausea, abdominal pain, heartburn, or change in bowel habits  MUSCULOSKELETAL: NEGATIVE for significant arthralgias or myalgia and keeping active.  ENDOCRINE: Osteopenia noted on DEXA and compared to 2020 results.  PSYCHIATRIC: NEGATIVE for changes in mood or affect      Objective    BP (!) 142/72 (BP Location: Right arm, Patient Position: Sitting, Cuff Size: Adult Large)   Pulse 81   Temp 97.9  F (36.6  C) (Oral)   Resp 17   Ht 1.556 m (5' 1.25\")   Wt 71 kg (156 lb 9.6 oz)   LMP  (LMP Unknown)   SpO2 98%   BMI 29.35 kg/m    Body mass index is 29.35 kg/m .  Physical Exam   GENERAL: healthy, alert and no distress  RESP: lungs clear to auscultation - no rales, rhonchi or " wheezes  CV: regular rate and rhythm, normal S1 S2, no S3 or S4, no murmur, click or rub, no peripheral edema and peripheral pulses strong  ABDOMEN: soft, nontender and bowel sounds normal  MS: no gross musculoskeletal defects noted, no edema  NEURO: Normal strength and tone, mentation intact and speech normal  PSYCH: mentation appears normal, affect normal/bright

## 2024-04-10 ENCOUNTER — TELEPHONE (OUTPATIENT)
Dept: FAMILY MEDICINE | Facility: CLINIC | Age: 79
End: 2024-04-10
Payer: COMMERCIAL

## 2024-04-10 DIAGNOSIS — E03.9 HYPOTHYROIDISM, UNSPECIFIED TYPE: ICD-10-CM

## 2024-04-10 DIAGNOSIS — I10 ESSENTIAL HYPERTENSION: ICD-10-CM

## 2024-04-10 DIAGNOSIS — Z00.00 ENCOUNTER FOR MEDICARE ANNUAL WELLNESS EXAM: Primary | ICD-10-CM

## 2024-04-10 NOTE — TELEPHONE ENCOUNTER
Order/Referral Request    Who is requesting: PT    Orders being requested: LABS PER ANNUAL    Reason service is needed/diagnosis: LABS PER ANNUAL    When are orders needed by: 08/22/2024    Has this been discussed with Provider: No (WILL BE EST CARE WITH A NEW PCP, DR. MELGOZA)    Does patient have a preference on a Group/Provider/Facility? [facility]    Does patient have an appointment scheduled?: Yes: HAS LAB APPT SCHEDULED FOR 08/22/2024    Where to send orders: Place orders within Epic    Could we send this information to you in [email or web address] or would you prefer to receive a phone call?:   No preference   Okay to leave a detailed message?: Yes at Cell number on file:    Telephone Information:   Mobile [phone]   Mobile [phone]

## 2024-04-10 NOTE — TELEPHONE ENCOUNTER
Will forward to POD to review.     Pt to see EH to est. Care, and annual physical    Nathalia Trimble

## 2024-05-03 ENCOUNTER — OFFICE VISIT (OUTPATIENT)
Dept: FAMILY MEDICINE | Facility: CLINIC | Age: 79
End: 2024-05-03
Payer: COMMERCIAL

## 2024-05-03 VITALS
BODY MASS INDEX: 29.6 KG/M2 | HEIGHT: 61 IN | WEIGHT: 156.8 LBS | TEMPERATURE: 97.8 F | DIASTOLIC BLOOD PRESSURE: 83 MMHG | OXYGEN SATURATION: 97 % | HEART RATE: 63 BPM | RESPIRATION RATE: 18 BRPM | SYSTOLIC BLOOD PRESSURE: 139 MMHG

## 2024-05-03 DIAGNOSIS — E78.5 HYPERLIPIDEMIA LDL GOAL <130: ICD-10-CM

## 2024-05-03 DIAGNOSIS — I10 ESSENTIAL HYPERTENSION: Primary | ICD-10-CM

## 2024-05-03 PROCEDURE — 99213 OFFICE O/P EST LOW 20 MIN: CPT | Performed by: GENERAL PRACTICE

## 2024-05-03 RX ORDER — PRAVASTATIN SODIUM 40 MG
40 TABLET ORAL DAILY
Qty: 30 TABLET | Refills: 3 | Status: CANCELLED | OUTPATIENT
Start: 2024-05-03

## 2024-05-03 RX ORDER — EZETIMIBE 10 MG/1
10 TABLET ORAL DAILY
Qty: 30 TABLET | Refills: 3 | Status: CANCELLED | OUTPATIENT
Start: 2024-05-03

## 2024-05-03 ASSESSMENT — ANXIETY QUESTIONNAIRES
6. BECOMING EASILY ANNOYED OR IRRITABLE: SEVERAL DAYS
IF YOU CHECKED OFF ANY PROBLEMS ON THIS QUESTIONNAIRE, HOW DIFFICULT HAVE THESE PROBLEMS MADE IT FOR YOU TO DO YOUR WORK, TAKE CARE OF THINGS AT HOME, OR GET ALONG WITH OTHER PEOPLE: NOT DIFFICULT AT ALL
GAD7 TOTAL SCORE: 3
5. BEING SO RESTLESS THAT IT IS HARD TO SIT STILL: NOT AT ALL
3. WORRYING TOO MUCH ABOUT DIFFERENT THINGS: SEVERAL DAYS
GAD7 TOTAL SCORE: 3
GAD7 TOTAL SCORE: 3
2. NOT BEING ABLE TO STOP OR CONTROL WORRYING: NOT AT ALL
7. FEELING AFRAID AS IF SOMETHING AWFUL MIGHT HAPPEN: NOT AT ALL
8. IF YOU CHECKED OFF ANY PROBLEMS, HOW DIFFICULT HAVE THESE MADE IT FOR YOU TO DO YOUR WORK, TAKE CARE OF THINGS AT HOME, OR GET ALONG WITH OTHER PEOPLE?: NOT DIFFICULT AT ALL
7. FEELING AFRAID AS IF SOMETHING AWFUL MIGHT HAPPEN: NOT AT ALL
4. TROUBLE RELAXING: NOT AT ALL
1. FEELING NERVOUS, ANXIOUS, OR ON EDGE: SEVERAL DAYS

## 2024-05-03 ASSESSMENT — PAIN SCALES - GENERAL: PAINLEVEL: NO PAIN (0)

## 2024-05-03 NOTE — PROGRESS NOTES
"  Assessment & Plan     Essential hypertension  Stable on norvasc 2.5 mg    Hyperlipidemia LDL goal <130  Discussed lowering her ASCVD risk and other statins  Wants to continue with current dose and medication and follow-up  next visit  Discussed lifestyles  Discussed TSH goal for age, can be up to 8        BMI  Estimated body mass index is 29.39 kg/m  as calculated from the following:    Height as of this encounter: 1.556 m (5' 1.25\").    Weight as of this encounter: 71.1 kg (156 lb 12.8 oz).             Meliton Mora is a 79 year old, presenting for the following health issues:  med dosages (Amlodipine, simvastatin)      5/3/2024    12:44 PM   Additional Questions   Roomed by Valery CARRASQUILLO         Concern about drug drug interaction of norvasc and simvastatin with muscle wasting.     Tried zetia in the past and possibly pravachol.    The 10-year ASCVD risk score (Lc MAURICIO, et al., 2019) is: 31.9%    Values used to calculate the score:      Age: 79 years      Sex: Female      Is Non- : No      Diabetic: No      Tobacco smoker: No      Systolic Blood Pressure: 139 mmHg      Is BP treated: Yes      HDL Cholesterol: 45 mg/dL      Total Cholesterol: 163 mg/dL      History of Present Illness       Hyperlipidemia:  She presents for follow up of hyperlipidemia.   She is taking medication to lower cholesterol. She is having myalgia or other side effects to statin medications.    Hypertension: She presents for follow up of hypertension.  She does check blood pressure  regularly outside of the clinic. Outside blood pressures have been over 140/90. She follows a low salt diet.     She eats 0-1 servings of fruits and vegetables daily.She consumes 0 sweetened beverage(s) daily.She exercises with enough effort to increase her heart rate 10 to 19 minutes per day.  She exercises with enough effort to increase her heart rate 3 or less days per week.   She is taking medications regularly.           Review " "of Systems  Constitutional, HEENT, cardiovascular, pulmonary, gi and gu systems are negative, except as otherwise noted.      Objective    /83 (BP Location: Right arm, Patient Position: Sitting, Cuff Size: Adult Regular)   Pulse 63   Temp 97.8  F (36.6  C) (Oral)   Resp 18   Ht 1.556 m (5' 1.25\")   Wt 71.1 kg (156 lb 12.8 oz)   LMP  (LMP Unknown)   SpO2 97%   BMI 29.39 kg/m    Body mass index is 29.39 kg/m .  Physical Exam  Constitutional:       Appearance: Normal appearance.   Eyes:      Extraocular Movements: Extraocular movements intact.   Cardiovascular:      Rate and Rhythm: Normal rate and regular rhythm.   Pulmonary:      Effort: Pulmonary effort is normal.      Breath sounds: Normal breath sounds.   Abdominal:      Palpations: Abdomen is soft.   Musculoskeletal:         General: Normal range of motion.      Cervical back: Normal range of motion.   Skin:     General: Skin is warm.   Neurological:      General: No focal deficit present.      Mental Status: She is alert and oriented to person, place, and time. Mental status is at baseline.   Psychiatric:         Mood and Affect: Mood normal.         Behavior: Behavior normal.         Thought Content: Thought content normal.         Judgment: Judgment normal.                    Signed Electronically by: Lyubov Hernandes MD    "

## 2024-05-06 ENCOUNTER — TELEPHONE (OUTPATIENT)
Dept: FAMILY MEDICINE | Facility: CLINIC | Age: 79
End: 2024-05-06
Payer: COMMERCIAL

## 2024-05-06 NOTE — TELEPHONE ENCOUNTER
Reason for Call:  Appointment Request    Patient requesting this type of appt:  Preventive     Requested provider:  Nanette Peck MD  or  Hillary Arthur MD    Reason patient unable to be scheduled: Not with their preferred provider    When does patient want to be seen/preferred time:  aug /2024    Comments: est care annual     Could we send this information to you in [email or web address] or would you prefer to receive a phone call?:   Patient would prefer a phone call   Okay to leave a detailed message?: Yes at Cell number on file:    Telephone Information:   Mobile [phone]   [employer] [phone]       Call taken on 5/6/2024 at 8:27 AM by Tabitha Bardales

## 2024-07-31 ENCOUNTER — PATIENT OUTREACH (OUTPATIENT)
Dept: CARE COORDINATION | Facility: CLINIC | Age: 79
End: 2024-07-31
Payer: COMMERCIAL

## 2024-08-31 SDOH — HEALTH STABILITY: PHYSICAL HEALTH
ON AVERAGE, HOW MANY DAYS PER WEEK DO YOU ENGAGE IN MODERATE TO STRENUOUS EXERCISE (LIKE A BRISK WALK)?: PATIENT DECLINED

## 2024-09-03 ENCOUNTER — HOSPITAL ENCOUNTER (OUTPATIENT)
Dept: MAMMOGRAPHY | Facility: CLINIC | Age: 79
Discharge: HOME | End: 2024-09-03
Attending: GENERAL PRACTICE | Admitting: GENERAL PRACTICE
Payer: COMMERCIAL

## 2024-09-03 DIAGNOSIS — Z12.31 VISIT FOR SCREENING MAMMOGRAM: ICD-10-CM

## 2024-09-03 PROCEDURE — 77063 BREAST TOMOSYNTHESIS BI: CPT

## 2024-09-05 ENCOUNTER — OFFICE VISIT (OUTPATIENT)
Dept: FAMILY MEDICINE | Facility: CLINIC | Age: 79
End: 2024-09-05
Payer: COMMERCIAL

## 2024-09-05 ENCOUNTER — TELEPHONE (OUTPATIENT)
Dept: FAMILY MEDICINE | Facility: CLINIC | Age: 79
End: 2024-09-05

## 2024-09-05 ENCOUNTER — ANCILLARY PROCEDURE (OUTPATIENT)
Dept: GENERAL RADIOLOGY | Facility: CLINIC | Age: 79
End: 2024-09-05
Payer: COMMERCIAL

## 2024-09-05 VITALS
HEIGHT: 61 IN | OXYGEN SATURATION: 97 % | WEIGHT: 151.5 LBS | SYSTOLIC BLOOD PRESSURE: 126 MMHG | TEMPERATURE: 97.9 F | RESPIRATION RATE: 16 BRPM | HEART RATE: 77 BPM | DIASTOLIC BLOOD PRESSURE: 70 MMHG | BODY MASS INDEX: 28.6 KG/M2

## 2024-09-05 DIAGNOSIS — R09.89 LUNG CRACKLES: ICD-10-CM

## 2024-09-05 DIAGNOSIS — Z00.00 ENCOUNTER FOR MEDICARE ANNUAL WELLNESS EXAM: Primary | ICD-10-CM

## 2024-09-05 DIAGNOSIS — I10 ESSENTIAL HYPERTENSION: ICD-10-CM

## 2024-09-05 DIAGNOSIS — R73.03 PREDIABETES: ICD-10-CM

## 2024-09-05 DIAGNOSIS — E78.5 HYPERLIPIDEMIA LDL GOAL <130: ICD-10-CM

## 2024-09-05 DIAGNOSIS — E03.9 HYPOTHYROIDISM, UNSPECIFIED TYPE: ICD-10-CM

## 2024-09-05 DIAGNOSIS — F43.22 ADJUSTMENT DISORDER WITH ANXIOUS MOOD: ICD-10-CM

## 2024-09-05 DIAGNOSIS — N90.4 LICHEN SCLEROSUS ET ATROPHICUS OF THE VULVA: ICD-10-CM

## 2024-09-05 LAB
ALBUMIN SERPL BCG-MCNC: 4.4 G/DL (ref 3.5–5.2)
ALP SERPL-CCNC: 88 U/L (ref 40–150)
ALT SERPL W P-5'-P-CCNC: 18 U/L (ref 0–50)
ANION GAP SERPL CALCULATED.3IONS-SCNC: 10 MMOL/L (ref 7–15)
AST SERPL W P-5'-P-CCNC: 28 U/L (ref 0–45)
BILIRUB SERPL-MCNC: 0.3 MG/DL
BUN SERPL-MCNC: 13.5 MG/DL (ref 8–23)
CALCIUM SERPL-MCNC: 9.4 MG/DL (ref 8.8–10.4)
CHLORIDE SERPL-SCNC: 103 MMOL/L (ref 98–107)
CHOLEST SERPL-MCNC: 154 MG/DL
CREAT SERPL-MCNC: 0.66 MG/DL (ref 0.51–0.95)
EGFRCR SERPLBLD CKD-EPI 2021: 89 ML/MIN/1.73M2
FASTING STATUS PATIENT QL REPORTED: YES
FASTING STATUS PATIENT QL REPORTED: YES
GLUCOSE SERPL-MCNC: 132 MG/DL (ref 70–99)
HBA1C MFR BLD: 6.1 % (ref 0–5.6)
HCO3 SERPL-SCNC: 28 MMOL/L (ref 22–29)
HDLC SERPL-MCNC: 48 MG/DL
LDLC SERPL CALC-MCNC: 75 MG/DL
NONHDLC SERPL-MCNC: 106 MG/DL
POTASSIUM SERPL-SCNC: 4.2 MMOL/L (ref 3.4–5.3)
PROT SERPL-MCNC: 7.1 G/DL (ref 6.4–8.3)
SODIUM SERPL-SCNC: 141 MMOL/L (ref 135–145)
TRIGL SERPL-MCNC: 154 MG/DL
TSH SERPL DL<=0.005 MIU/L-ACNC: 1.37 UIU/ML (ref 0.3–4.2)

## 2024-09-05 PROCEDURE — 36415 COLL VENOUS BLD VENIPUNCTURE: CPT

## 2024-09-05 PROCEDURE — G0439 PPPS, SUBSEQ VISIT: HCPCS

## 2024-09-05 PROCEDURE — 71046 X-RAY EXAM CHEST 2 VIEWS: CPT | Mod: TC | Performed by: INTERNAL MEDICINE

## 2024-09-05 PROCEDURE — 80053 COMPREHEN METABOLIC PANEL: CPT

## 2024-09-05 PROCEDURE — 83036 HEMOGLOBIN GLYCOSYLATED A1C: CPT

## 2024-09-05 PROCEDURE — 80061 LIPID PANEL: CPT

## 2024-09-05 PROCEDURE — 84443 ASSAY THYROID STIM HORMONE: CPT

## 2024-09-05 PROCEDURE — 99214 OFFICE O/P EST MOD 30 MIN: CPT | Mod: 25

## 2024-09-05 RX ORDER — PROPRANOLOL HYDROCHLORIDE 10 MG/1
10 TABLET ORAL 2 TIMES DAILY PRN
Qty: 60 TABLET | Refills: 1 | Status: SHIPPED | OUTPATIENT
Start: 2024-09-05

## 2024-09-05 RX ORDER — GUAIFENESIN 600 MG/1
1200 TABLET, EXTENDED RELEASE ORAL 2 TIMES DAILY PRN
Qty: 40 TABLET | Refills: 0 | Status: CANCELLED | OUTPATIENT
Start: 2024-09-05

## 2024-09-05 RX ORDER — LEVOTHYROXINE SODIUM 100 UG/1
100 TABLET ORAL DAILY
Qty: 90 TABLET | Refills: 4 | Status: CANCELLED | OUTPATIENT
Start: 2024-09-05

## 2024-09-05 RX ORDER — HALOBETASOL PROPIONATE 0.5 MG/G
CREAM TOPICAL
Qty: 50 G | Refills: 1 | Status: SHIPPED | OUTPATIENT
Start: 2024-09-05

## 2024-09-05 RX ORDER — AMLODIPINE BESYLATE 2.5 MG/1
2.5 TABLET ORAL DAILY
Qty: 90 TABLET | Refills: 4 | Status: SHIPPED | OUTPATIENT
Start: 2024-09-05

## 2024-09-05 RX ORDER — SIMVASTATIN 40 MG
40 TABLET ORAL AT BEDTIME
Qty: 90 TABLET | Refills: 4 | Status: CANCELLED | OUTPATIENT
Start: 2024-09-05

## 2024-09-05 RX ORDER — SIMVASTATIN 20 MG
20 TABLET ORAL AT BEDTIME
Qty: 90 TABLET | Refills: 1 | Status: SHIPPED | OUTPATIENT
Start: 2024-09-05

## 2024-09-05 RX ORDER — DIMETHICONE 0.01 G/G
OINTMENT TOPICAL
Refills: 1 | Status: CANCELLED | OUTPATIENT
Start: 2024-09-05

## 2024-09-05 ASSESSMENT — PAIN SCALES - GENERAL: PAINLEVEL: NO PAIN (0)

## 2024-09-05 NOTE — PATIENT INSTRUCTIONS
Let me know if you feel like you want to do physical therapy for balance and I can put an order in     We are decreasing your simvastatin. Lets recheck fasting cholesterol in about 6 months. You can schedule a lab only appointment for this     Patient Education   Preventive Care Advice   This is general advice given by our system to help you stay healthy. However, your care team may have specific advice just for you. Please talk to your care team about your preventive care needs.  Nutrition  Eat 5 or more servings of fruits and vegetables each day.  Try wheat bread, brown rice and whole grain pasta (instead of white bread, rice, and pasta).  Get enough calcium and vitamin D. Check the label on foods and aim for 100% of the RDA (recommended daily allowance).  Lifestyle  Exercise at least 150 minutes each week  (30 minutes a day, 5 days a week).  Do muscle strengthening activities 2 days a week. These help control your weight and prevent disease.  No smoking.  Wear sunscreen to prevent skin cancer.  Have a dental exam and cleaning every 6 months.  Yearly exams  See your health care team every year to talk about:  Any changes in your health.  Any medicines your care team has prescribed.  Preventive care, family planning, and ways to prevent chronic diseases.  Shots (vaccines)   HPV shots (up to age 26), if you've never had them before.  Hepatitis B shots (up to age 59), if you've never had them before.  COVID-19 shot: Get this shot when it's due.  Flu shot: Get a flu shot every year.  Tetanus shot: Get a tetanus shot every 10 years.  Pneumococcal, hepatitis A, and RSV shots: Ask your care team if you need these based on your risk.  Shingles shot (for age 50 and up)  General health tests  Diabetes screening:  Starting at age 35, Get screened for diabetes at least every 3 years.  If you are younger than age 35, ask your care team if you should be screened for diabetes.  Cholesterol test: At age 39, start having a  cholesterol test every 5 years, or more often if advised.  Bone density scan (DEXA): At age 50, ask your care team if you should have this scan for osteoporosis (brittle bones).  Hepatitis C: Get tested at least once in your life.  STIs (sexually transmitted infections)  Before age 24: Ask your care team if you should be screened for STIs.  After age 24: Get screened for STIs if you're at risk. You are at risk for STIs (including HIV) if:  You are sexually active with more than one person.  You don't use condoms every time.  You or a partner was diagnosed with a sexually transmitted infection.  If you are at risk for HIV, ask about PrEP medicine to prevent HIV.  Get tested for HIV at least once in your life, whether you are at risk for HIV or not.  Cancer screening tests  Cervical cancer screening: If you have a cervix, begin getting regular cervical cancer screening tests starting at age 21.  Breast cancer scan (mammogram): If you've ever had breasts, begin having regular mammograms starting at age 40. This is a scan to check for breast cancer.  Colon cancer screening: It is important to start screening for colon cancer at age 45.  Have a colonoscopy test every 10 years (or more often if you're at risk) Or, ask your provider about stool tests like a FIT test every year or Cologuard test every 3 years.  To learn more about your testing options, visit:   .  For help making a decision, visit:   https://bit.ly/in90168.  Prostate cancer screening test: If you have a prostate, ask your care team if a prostate cancer screening test (PSA) at age 55 is right for you.  Lung cancer screening: If you are a current or former smoker ages 50 to 80, ask your care team if ongoing lung cancer screenings are right for you.  For informational purposes only. Not to replace the advice of your health care provider. Copyright   2023 [place] [employer]. All rights reserved. Clinically reviewed by the [facility]  [facility]. get2play 751961 - REV 01/24.

## 2024-09-05 NOTE — TELEPHONE ENCOUNTER
----- Message from Bekah Perales sent at 9/5/2024  3:21 PM CDT -----  Please call patient     CXR does not show any signs of pneumonia (infection). It shows possible atelectasis, which is where some of the alveoli sacs are not open. This can happen due to lung disease or shallow breathing, among other things. I would just encourage her to make sure she is taking deep (not shallow) breaths. Sometimes this can lead to pneumonia so she should watch for signs of infection such as fever, cough, shortness of breath. If this happens, she should be seen. Since she is not having symptoms there is nothing else we need to do.     Bekah Perales, AMADO, CNP

## 2024-09-05 NOTE — TELEPHONE ENCOUNTER
Called patient and left voicemail and asked patient to call back. Attempt # 1.     Wendi Taylor RN 9/5/2024  [facility]

## 2024-09-05 NOTE — PROGRESS NOTES
Preventive Care Visit  [facility]  AMADO Renee CNP, Nurse Practitioner Primary Care  Sep 5, 2024      Assessment & Plan     Encounter for Medicare annual wellness exam  Health maintenance updated. Will get flu shot later this fall    Essential hypertension  Stable. Well controlled on 2.5 amlodipine. Refilled   - amLODIPine (NORVASC) 2.5 MG tablet; Take 1 tablet (2.5 mg) by mouth daily.  - Comprehensive metabolic panel (BMP + Alb, Alk Phos, ALT, AST, Total. Bili, TP); Future  - Comprehensive metabolic panel (BMP + Alb, Alk Phos, ALT, AST, Total. Bili, TP)    Hypothyroidism, unspecified type  On 100mcg levothyroxine. Does endorse some fatigue and dry hair/skin. Will update labs and adjust dose if indicated  - TSH with free T4 reflex; Future  - TSH with free T4 reflex    Prediabetes  - Hemoglobin A1c; Future  - Hemoglobin A1c    Hyperlipidemia LDL goal <130  On 40mg simvastatin. Patient worried about effects of this dose of simvastatin in combination with amlodipine. She does endorse some muscle aches. Will decrease simvastatin to 20mg as recommended when used in combination with amlodipine. Plan to recheck fasting lipids in 6 months. If muscle aches continue would recommend decreasing dose further or possibly switching to another statin.  - Lipid panel reflex to direct LDL Fasting; Future  - simvastatin (ZOCOR) 20 MG tablet; Take 1 tablet (20 mg) by mouth at bedtime.  - Lipid panel reflex to direct LDL Fasting; Future  - Lipid panel reflex to direct LDL Fasting    Adjustment disorder with anxious mood  Stable on propranolol  - propranolol (INDERAL) 10 MG tablet; Take 1 tablet (10 mg) by mouth 2 times daily as needed (Anxiety).    Lung crackles  Fine crackles noted in LLL. Otherwise asymptomatic. Will be cautious and order CXR  - XR Chest 2 Views; Future    Lichen sclerosus et atrophicus of the vulva  - halobetasol (ULTRAVATE) 0.05 % external cream; Use at bedtime weekly for  "maintenance of Lichen sclerosis.            BMI  Estimated body mass index is 28.39 kg/m  as calculated from the following:    Height as of this encounter: 1.556 m (5' 1.25\").    Weight as of this encounter: 68.7 kg (151 lb 8 oz).       Counseling  Appropriate preventive services were addressed with this patient via screening, questionnaire, or discussion as appropriate for fall prevention, nutrition, physical activity, Tobacco-use cessation, social engagement, weight loss and cognition.  Checklist reviewing preventive services available has been given to the patient.  Reviewed patient's diet, addressing concerns and/or questions.   She is at risk for psychosocial distress and has been provided with information to reduce risk.         Meliton Mora is a 79 year old, presenting for the following:  Wellness Visit        9/5/2024     8:07 AM   Additional Questions   Roomed by Imelda Rogers EMT-B       Health Care Directive  Patient does not have a Health Care Directive or Living Will: Discussed advance care planning with patient; however, patient declined at this time.    HPI    HTN - on amlodipine. Checks BP at home a few times a week, usually runs 110's-120's.     Hypothyroid - on levothyroxine  -Denies heat/cold intolerance, tremor, palpitations, anxiety, unintentional weight loss or weight gain, diarrhea, SOB, weakness  -Gets tired in the afternoon, also endorses dry skin. Chronic constipation.     Propranolol - as needed for anxiety and also blood pressure. When she went off it in the past her BP increased so she has stayed on low dose. Does not take twice a day and sometimes not daily but finds this effective    HLD - on simvastatin. She endorses muscle aches. She is concerned about the combination of amlodipine and 40mg simvastatin due to drug effects of amlodipine increasing levels of simvastatin          8/31/2024   General Health   How would you rate your overall physical health? Good   Feel stress " (tense, anxious, or unable to sleep) Only a little      (!) STRESS CONCERN      8/31/2024   Nutrition   Diet: Carbohydrate counting            8/31/2024   Exercise   Days per week of moderate/strenous exercise Patient declined   Average minutes spent exercising at this level Patient declined            8/31/2024   Social Factors   Frequency of gathering with friends or relatives Once a week   Worry food won't last until get money to buy more No   Food not last or not have enough money for food? No   Do you have housing? (Housing is defined as stable permanent housing and does not include staying ouside in a car, in a tent, in an abandoned building, in an overnight shelter, or couch-surfing.) Yes   Are you worried about losing your housing? No   Lack of transportation? No   Unable to get utilities (heat,electricity)? No            8/31/2024   Fall Risk   Fallen 2 or more times in the past year? No    No   Trouble with walking or balance? No    No       Multiple values from one day are sorted in reverse-chronological order          8/31/2024   Activities of Daily Living- Home Safety   Needs help with the following daily activites None of the above   Safety concerns in the home None of the above            8/31/2024   Dental   Dentist two times every year? Yes            8/31/2024   Hearing Screening   Hearing concerns? None of the above            8/31/2024   Driving Risk Screening   Patient/family members have concerns about driving No            8/31/2024   General Alertness/Fatigue Screening   Have you been more tired than usual lately? No            8/31/2024   Urinary Incontinence Screening   Bothered by leaking urine in past 6 months No            8/31/2024   TB Screening   Were you born outside of the [place]? No            Today's PHQ-2 Score:       9/4/2024     6:17 AM   PHQ-2 ( 1999 [employer])   Q1: Little interest or pleasure in doing things 0   Q2: Feeling down, depressed or hopeless 0   PHQ-2 Score 0   Q1: Little  interest or pleasure in doing things Not at all   Q2: Feeling down, depressed or hopeless Not at all   PHQ-2 Score 0           8/31/2024   Substance Use   Alcohol more than 3/day or more than 7/wk No   Do you have a current opioid prescription? No   How severe/bad is pain from 1 to 10? 0/10 (No Pain)   Do you use any other substances recreationally? No        Social History     Tobacco Use    Smoking status: Never     Passive exposure: Never    Smokeless tobacco: Never   Vaping Use    Vaping status: Never Used   Substance Use Topics    Alcohol use: Not Currently     Comment: social only    Drug use: No           9/3/2024   LAST FHS-7 RESULTS   1st degree relative breast or ovarian cancer No   Any relative bilateral breast cancer No   Any male have breast cancer No   Any ONE woman have BOTH breast AND ovarian cancer No   Any woman with breast cancer before 50yrs No   2 or more relatives with breast AND/OR ovarian cancer No   2 or more relatives with breast AND/OR bowel cancer No      Mammogram Screening - After age 74- determine frequency with patient based on health status, life expectancy and patient goals    ASCVD Risk   The 10-year ASCVD risk score (Lc MAURICIO, et al., 2019) is: 27%    Values used to calculate the score:      Age: 79 years      Sex: Female      Is Non- : No      Diabetic: No      Tobacco smoker: No      Systolic Blood Pressure: 126 mmHg      Is BP treated: Yes      HDL Cholesterol: 45 mg/dL      Total Cholesterol: 163 mg/dL      Reviewed and updated as needed this visit by Provider   Tobacco   Meds   Med Hx  Surg Hx  Fam Hx  Soc Hx Sexual Activity            Current providers sharing in care for this patient include:  Patient Care Team:  Lyubov Hernandes MD as PCP - General  Gela Bueno MD as Assigned PCP  Josette Pineda RPH as Pharmacist (Pharmacist)  Ivette Murillo PA-C as Physician Assistant (Dermatology)    The following health maintenance  "items are reviewed in Epic and correct as of today:  Health Maintenance   Topic Date Due    LIPID  08/08/2024    INFLUENZA VACCINE (1) 09/01/2024    COVID-19 Vaccine (7 - 2023-24 season) 09/01/2024    TSH W/FREE T4 REFLEX  08/08/2024    ANCELMO ASSESSMENT  11/03/2024    MAMMO SCREENING  09/03/2025    MEDICARE ANNUAL WELLNESS VISIT  09/05/2025    ANNUAL REVIEW OF HM ORDERS  09/05/2025    FALL RISK ASSESSMENT  09/05/2025    GLUCOSE  09/14/2026    COLORECTAL CANCER SCREENING  11/16/2026    ADVANCE CARE PLANNING  09/05/2029    DTAP/TDAP/TD IMMUNIZATION (3 - Td or Tdap) 09/24/2030    DEXA  10/12/2038    HEPATITIS C SCREENING  Completed    PHQ-2 (once per calendar year)  Completed    Pneumococcal Vaccine: 65+ Years  Completed    ZOSTER IMMUNIZATION  Completed    RSV VACCINE  Completed    HPV IMMUNIZATION  Aged Out    MENINGITIS IMMUNIZATION  Aged Out    RSV MONOCLONAL ANTIBODY  Aged Out         Review of Systems  Constitutional, HEENT, cardiovascular, pulmonary, GI, , musculoskeletal, neuro, skin, endocrine and psych systems are negative, except as otherwise noted.     Objective    Exam  /70 (BP Location: Right arm, Patient Position: Sitting, Cuff Size: Adult Regular)   Pulse 77   Temp 97.9  F (36.6  C) (Oral)   Resp 16   Ht 1.556 m (5' 1.25\")   Wt 68.7 kg (151 lb 8 oz)   SpO2 97%   BMI 28.39 kg/m     Estimated body mass index is 28.39 kg/m  as calculated from the following:    Height as of this encounter: 1.556 m (5' 1.25\").    Weight as of this encounter: 68.7 kg (151 lb 8 oz).    Physical Exam  GENERAL: alert and no distress  EYES: Eyes grossly normal to inspection, and conjunctivae and sclerae normal  HENT: ear canals and TM's normal, and mouth without ulcers or lesions  NECK: no adenopathy, no asymmetry, masses, or scars  RESP: Left lower lobe with fine crackles. Rest of lung fields clear without crackles or wheezes  CV: regular rate and rhythm, normal S1 S2, no S3 or S4, no murmur, click or rub, no " peripheral edema  ABDOMEN: soft, nontender, no masses and bowel sounds normal  MS: no gross musculoskeletal defects noted, no edema  PSYCH: mentation appears normal, affect normal/bright        9/5/2024   Mini Cog   Clock Draw Score 2 Normal   3 Item Recall 2 objects recalled   Mini Cog Total Score 4          Signed Electronically by: AMADO Renee CNP

## 2024-09-06 DIAGNOSIS — E03.9 HYPOTHYROIDISM, UNSPECIFIED TYPE: ICD-10-CM

## 2024-09-06 RX ORDER — LEVOTHYROXINE SODIUM 100 UG/1
100 TABLET ORAL DAILY
Qty: 90 TABLET | Refills: 3 | Status: SHIPPED | OUTPATIENT
Start: 2024-09-06

## 2024-09-09 NOTE — TELEPHONE ENCOUNTER
"Outgoing call to patient. Notified Patient of AMADO Renee CNP's message: \"CXR does not show any signs of pneumonia (infection). It shows possible atelectasis, which is where some of the alveoli sacs are not open. This can happen due to lung disease or shallow breathing, among other things. I would just encourage her to make sure she is taking deep (not shallow) breaths. Sometimes this can lead to pneumonia so she should watch for signs of infection such as fever, cough, shortness of breath. If this happens, she should be seen. Since she is not having symptoms there is nothing else we need to do.\"    Person was given an opportunity to ask questions, verbalized understanding of plan, and is agreeable.    Angelica Hernandez RN on 9/9/2024 at 3:44 PM   "

## 2024-09-22 DIAGNOSIS — E78.5 HYPERLIPIDEMIA LDL GOAL <130: ICD-10-CM

## 2024-09-23 RX ORDER — SIMVASTATIN 40 MG
40 TABLET ORAL AT BEDTIME
Qty: 90 TABLET | Refills: 0 | OUTPATIENT
Start: 2024-09-23

## 2024-10-11 ENCOUNTER — OFFICE VISIT (OUTPATIENT)
Dept: DERMATOLOGY | Facility: CLINIC | Age: 79
End: 2024-10-11
Payer: COMMERCIAL

## 2024-10-11 DIAGNOSIS — L81.4 LENTIGO: ICD-10-CM

## 2024-10-11 DIAGNOSIS — L30.9 DERMATITIS: Primary | ICD-10-CM

## 2024-10-11 DIAGNOSIS — L82.1 SEBORRHEIC KERATOSIS: ICD-10-CM

## 2024-10-11 PROCEDURE — 99204 OFFICE O/P NEW MOD 45 MIN: CPT | Performed by: PHYSICIAN ASSISTANT

## 2024-10-11 RX ORDER — BETAMETHASONE DIPROPIONATE 0.5 MG/G
CREAM TOPICAL
Qty: 50 G | Refills: 2 | Status: SHIPPED | OUTPATIENT
Start: 2024-10-11

## 2024-10-11 ASSESSMENT — PAIN SCALES - GENERAL: PAINLEVEL: NO PAIN (0)

## 2024-10-11 NOTE — PROGRESS NOTES
HPI:   Chief complaints: Abby Casey is a pleasant 79 year old female who presents for evaluation of spots on the nose. She noticed a few darker areas on the nose recently. None are tender or bleeding. She also has two moles on the back to check that are intermittently itchy. Lastly she has a new rash on the neck present for the past few days. It is itchy. She tried OTC sarna and HC cream which didn't seem to help. She declines a FSE today.       PHYSICAL EXAM:    LMP  (LMP Unknown)   Breastfeeding No   Skin exam performed as follows: Type 2 skin. Mood appropriate  Alert and Oriented X 3. Well developed, well nourished in no distress.  General appearance: Normal  Head including face: Normal  Eyes: conjunctiva and lids: Normal  Mouth: Lips, teeth, gums: Normal  Neck: Normal  Skin: Scalp and body hair: See below    Brown macules on the nose   Dermatitis on the anterior neck  Keratotic papules on the back    ASSESSMENT/PLAN:     Lentigines, photodamage on the nose - all benign in appearance today. Recommend she at least get face checked annually.   Dermatitis on the anterior neck  --Start betamethasone cream BID x 1-2 weeks then PRN  Seborrheic keratoses - advised benign no treatment needed          Follow-up: PRN  CC:   Scribed By: Ivette Murillo, MS, PAMillyC

## 2024-10-11 NOTE — PATIENT INSTRUCTIONS
Proper skin care from [facility]:    -Eliminate harsh soaps as they strip the natural oils from the skin, often resulting in dry itchy skin ( i.e. Dial, Zest, [place] Spring)  -Use mild soaps such as Cetaphil or Dove Sensitive Skin in the shower. You do not need to use soap on arms, legs, and trunk every time you shower unless visibly soiled.   -Avoid hot or cold showers.  -After showering, lightly dry off and apply moisturizing within 2-3 minutes. This will help trap moisture in the skin.   -Aggressive use of a moisturizer at least 1-2 times a day to the entire body (including -Vanicream, Cetaphil, Aquaphor or Cerave) and moisturize hands after every washing.  -We recommend using moisturizers that come in a tub that needs to be scooped out, not a pump. This has more of an oil base. It will hold moisture in your skin much better than a water base moisturizer. The above recommended are non-pore clogging.      Wear a sunscreen with at least SPF 30 on your face, ears, neck and V of the chest daily. Wear sunscreen on other areas of the body if those areas are exposed to the sun throughout the day. Sunscreens can contain physical and/or chemical blockers. Physical blockers are less likely to clog pores, these include zinc oxide and titanium dioxide. Reapply every two hour and after swimming.     Sunscreen examples: https://www.ewg.org/sunscreen/    UV radiation  UVA radiation remains constant throughout the day and throughout the year. It is a longer wavelength than UVB and therefore penetrates deeper into the skin leading to immediate and delayed tanning, photoaging, and skin cancer. 70-80% of UVA and UVB radiation occurs between the hours of 10am-2pm.  UVB radiation  UVB radiation causes the most harmful effects and is more significant during the summer months. However, snow and ice can reflect UVB radiation leading to skin damage during the winter months as well. UVB radiation is responsible for tanning,  burning, inflammation, delayed erythema (pinkness), pigmentation (brown spots), and skin cancer.     I recommend self monthly full body exams and yearly full body exams with a dermatology provider. If you develop a new or changing lesion please follow up for examination. Most skin cancers are pink and scaly or pink and pearly. However, we do see blue/brown/black skin cancers.  Consider the ABCDEs of melanoma when giving yourself your monthly full body exam ( don't forget the groin, buttocks, feet, toes, etc). A-asymmetry, B-borders, C-color, D-diameter, E-elevation or evolving. If you see any of these changes please follow up in clinic. If you cannot see your back I recommend purchasing a hand held mirror to use with a larger wall mirror.       Checking for Skin Cancer  You can find cancer early by checking your skin each month. There are 3 kinds of skin cancer. They are melanoma, basal cell carcinoma, and squamous cell carcinoma. Doing monthly skin checks is the best way to find new marks or skin changes. Follow the instructions below for checking your skin.   The ABCDEs of checking moles for melanoma   Check your moles or growths for signs of melanoma using ABCDE:   Asymmetry: the sides of the mole or growth don t match  Border: the edges are ragged, notched, or blurred  Color: the color within the mole or growth varies  Diameter: the mole or growth is larger than 6 mm (size of a pencil eraser)  Evolving: the size, shape, or color of the mole or growth is changing (evolving is not shown in the images below)    Checking for other types of skin cancer  Basal cell carcinoma or squamous cell carcinoma have symptoms such as:     A spot or mole that looks different from all other marks on your skin  Changes in how an area feels, such as itching, tenderness, or pain  Changes in the skin's surface, such as oozing, bleeding, or scaliness  A sore that does not heal  New swelling or redness beyond the border of a  mole    Who s at risk?  Anyone can get skin cancer. But you are at greater risk if you have:   Fair skin, light-colored hair, or light-colored eyes  Many moles or abnormal moles on your skin  A history of sunburns from sunlight or tanning beds  A family history of skin cancer  A history of exposure to radiation or chemicals  A weakened immune system  If you have had skin cancer in the past, you are at risk for recurring skin cancer.   How to check your skin  Do your monthly skin checkups in front of a full-length mirror. Check all parts of your body, including your:   Head (ears, face, neck, and scalp)  Torso (front, back, and sides)  Arms (tops, undersides, upper, and lower armpits)  Hands (palms, backs, and fingers, including under the nails)  Buttocks and genitals  Legs (front, back, and sides)  Feet (tops, soles, toes, including under the nails, and between toes)  If you have a lot of moles, take digital photos of them each month. Make sure to take photos both up close and from a distance. These can help you see if any moles change over time.   Most skin changes are not cancer. But if you see any changes in your skin, call your doctor right away. Only he or she can diagnose a problem. If you have skin cancer, seeing your doctor can be the first step toward getting the treatment that could save your life.   [employer] last reviewed this educational content on 4/1/2019 2000-2020 The "[employer]. [address] [place] [address], [place] [address]. All rights reserved. This information is not intended as a substitute for professional medical care. Always follow your healthcare professional's instructions.       When should I call my doctor?  If you are worsening or not improving, please, contact us or seek urgent care as noted below.     Who should I call with questions (adults)?    [facility] [phone]  For urgent needs outside of business hours call the [facility] at  [phone] and ask for the dermatology resident on call to be paged  If this is a medical emergency and you are unable to reach an ER, Call [phone]      If you need a prescription refill, please contact your pharmacy. Refills are approved or denied by our Physicians during normal business hours, Monday through Fridays  Per office policy, refills will not be granted if you have not been seen within the past year (or sooner depending on your child's condition)

## 2024-10-11 NOTE — LETTER
10/11/2024      Abby Casey  [address] [place] [id number]      Dear Colleague,    Thank you for referring your patient, Abby Casey, to the [facility]. Please see a copy of my visit note below.    HPI:   Chief complaints: Abby Casey is a pleasant 79 year old female who presents for evaluation of spots on the nose. She noticed a few darker areas on the nose recently. None are tender or bleeding. She also has two moles on the back to check that are intermittently itchy. Lastly she has a new rash on the neck present for the past few days. It is itchy. She tried OTC sarna and HC cream which didn't seem to help. She declines a FSE today.       PHYSICAL EXAM:    LMP  (LMP Unknown)   Breastfeeding No   Skin exam performed as follows: Type 2 skin. Mood appropriate  Alert and Oriented X 3. Well developed, well nourished in no distress.  General appearance: Normal  Head including face: Normal  Eyes: conjunctiva and lids: Normal  Mouth: Lips, teeth, gums: Normal  Neck: Normal  Skin: Scalp and body hair: See below    Brown macules on the nose   Dermatitis on the anterior neck  Keratotic papules on the back    ASSESSMENT/PLAN:     Lentigines, photodamage on the nose - all benign in appearance today. Recommend she at least get face checked annually.   Dermatitis on the anterior neck  --Start betamethasone cream BID x 1-2 weeks then PRN  Seborrheic keratoses - advised benign no treatment needed          Follow-up: PRN  CC:   Scribed By: Ivette Weller MS, PAROSA MARIA      Again, thank you for allowing me to participate in the care of your patient.        Sincerely,        Ivette Weller PA-C

## 2024-10-12 ENCOUNTER — MYC MEDICAL ADVICE (OUTPATIENT)
Dept: FAMILY MEDICINE | Facility: CLINIC | Age: 79
End: 2024-10-12
Payer: COMMERCIAL

## 2024-10-14 ENCOUNTER — MYC MEDICAL ADVICE (OUTPATIENT)
Dept: FAMILY MEDICINE | Facility: CLINIC | Age: 79
End: 2024-10-14
Payer: COMMERCIAL

## 2024-10-14 NOTE — TELEPHONE ENCOUNTER
See [employer] Message. Please review and advise on plan. Halobetasol prescribed by AMADO Renee CNP.    Angelica Hernandez RN on 10/14/2024 at 8:02 AM

## 2025-02-26 ENCOUNTER — ANCILLARY PROCEDURE (OUTPATIENT)
Dept: GENERAL RADIOLOGY | Facility: CLINIC | Age: 80
End: 2025-02-26
Attending: PHYSICIAN ASSISTANT
Payer: COMMERCIAL

## 2025-02-26 ENCOUNTER — OFFICE VISIT (OUTPATIENT)
Dept: ORTHOPEDICS | Facility: CLINIC | Age: 80
End: 2025-02-26
Payer: COMMERCIAL

## 2025-02-26 DIAGNOSIS — M25.552 ACUTE HIP PAIN, LEFT: ICD-10-CM

## 2025-02-26 DIAGNOSIS — M25.552 ACUTE HIP PAIN, LEFT: Primary | ICD-10-CM

## 2025-02-26 PROCEDURE — 73502 X-RAY EXAM HIP UNI 2-3 VIEWS: CPT | Mod: TC | Performed by: RADIOLOGY

## 2025-02-26 PROCEDURE — 99203 OFFICE O/P NEW LOW 30 MIN: CPT | Performed by: PHYSICIAN ASSISTANT

## 2025-02-26 NOTE — LETTER
2/26/2025      Abby Casey  [address] [place] [address]      Dear Colleague,    Thank you for referring your patient, Abby Casey, to the [facility] [place]. Please see a copy of my visit note below.    ASSESSMENT & PLAN  Abby Casey her left hip pain.  We discussed that her symptoms are consistent with a hip bursitis and recommend conservative management with over-the-counter medications, creams, heat and ice.  A referral to physical therapy was provided.  We discussed that symptoms do not improve with this to follow-up in sports medicine for consideration of corticosteroid injection.  She was understanding of this plan and agreement.  All questions answered.    Marla Ceballos PA-C  [facility]    -----  Chief Complaint   Patient presents with     Left Hip - Pain       SUBJECTIVE  Abby Casey is a/an 79 year old  female who is seen as a WALK IN patient for evaluation of left hip.  Onset was about 1 month. Pain is located left hip. Symptoms are worsened by going up stairs, walking.  She has tried ice and heat, b-active band. Associated symptoms include  none. Denies numbness/tingling and instability.  Does report a fall that occurred yesterday that increased her hip pain but that did improve to today.    The patient is seen alone    Prior injury/Surgical history of affected joint: L knee 2012 and R shoulder 2015  Social History/Occupation: retired    REVIEW OF SYSTEMS:  Pertinent positives/negative: As stated above in HPI    OBJECTIVE:  LMP  (LMP Unknown)    General: Alert and in no distress  Skin: no visable rashes  CV: Extremities appear well perfused   Resp: normal respiratory effort, no conversational dyspnea   Psych: normal mood, affect  MSK: LEFT Hip Exam    Inspection:  There is no evidence of open wounds  There is no evidence of erythema or infection  There is no obvious ecchymosis    Palpation:  There is  no palpable fluctuance  There is tenderness to palpation at greater trochanter  There is no tenderness to palpation at SI joint    Range of motion:   Flexion: 90   Internal rotation: 15  External rotation: 30    Strength:   Flexion: 5/5  Abduction: 5/5  Adduction: 5/5    Negative Log roll Test    Dorsalis pedis pulse is palpable and equal to contralateral side    Sensation is intact to light touch in the bilateral lower extremities        RADIOLOGY:  Final results and radiologist's interpretation available in the [facility] health record.  Images below were personally reviewed and discussed with the patient in the office today.  My personal interpretation of the performed imaging: Left hip x-rays demonstrate mild degenerative changes of the hip joints.  Advanced degenerative changes of the lumbar spine visualized on x-rays.  No acute fracture.        Again, thank you for allowing me to participate in the care of your patient.        Sincerely,        Marla Ceballos PA-C    Electronically signed

## 2025-02-26 NOTE — PROGRESS NOTES
ASSESSMENT & PLAN  Abby Casey her left hip pain.  We discussed that her symptoms are consistent with a hip bursitis and recommend conservative management with over-the-counter medications, creams, heat and ice.  A referral to physical therapy was provided.  We discussed that symptoms do not improve with this to follow-up in sports medicine for consideration of corticosteroid injection.  She was understanding of this plan and agreement.  All questions answered.    Marla JACOBSEN [facility]    -----  Chief Complaint   Patient presents with    Left Hip - Pain       SUBJECTIVE  Abby Casey is a/an 79 year old  female who is seen as a WALK IN patient for evaluation of left hip.  Onset was about 1 month. Pain is located left hip. Symptoms are worsened by going up stairs, walking.  She has tried ice and heat, b-active band. Associated symptoms include  none. Denies numbness/tingling and instability.  Does report a fall that occurred yesterday that increased her hip pain but that did improve to today.    The patient is seen alone    Prior injury/Surgical history of affected joint: L knee 2012 and R shoulder 2015  Social History/Occupation: retired    REVIEW OF SYSTEMS:  Pertinent positives/negative: As stated above in HPI    OBJECTIVE:  LMP  (LMP Unknown)    General: Alert and in no distress  Skin: no visable rashes  CV: Extremities appear well perfused   Resp: normal respiratory effort, no conversational dyspnea   Psych: normal mood, affect  MSK: LEFT Hip Exam    Inspection:  There is no evidence of open wounds  There is no evidence of erythema or infection  There is no obvious ecchymosis    Palpation:  There is no palpable fluctuance  There is tenderness to palpation at greater trochanter  There is no tenderness to palpation at SI joint    Range of motion:   Flexion: 90   Internal rotation: 15  External rotation: 30    Strength:   Flexion: 5/5  Abduction: 5/5  Adduction:  5/5    Negative Log roll Test    Dorsalis pedis pulse is palpable and equal to contralateral side    Sensation is intact to light touch in the bilateral lower extremities        RADIOLOGY:  Final results and radiologist's interpretation available in the [facility] health record.  Images below were personally reviewed and discussed with the patient in the office today.  My personal interpretation of the performed imaging: Left hip x-rays demonstrate mild degenerative changes of the hip joints.  Advanced degenerative changes of the lumbar spine visualized on x-rays.  No acute fracture.

## 2025-02-26 NOTE — PATIENT INSTRUCTIONS
1. Acute hip pain, left      Recommendations  - OTC Tylenol/Acetaminophen 1000 mg every 8 hours for pain as needed. NSAID (OTC Aleve/Naproxen 220 mg) can take up to twice daily (every 12 hours)  -Topical creams such as Voltaren gel, Asperecreme, Biofreeze, china gel, Tiger Balm or blue emu.   - Apply and alternate heat and ice    Physical/Occupational therapy referral placed    Follow up with Sports medicine if symptoms worsen or do not improve    -Call direct clinic number [[phone]] at any time with questions or concerns.    -Orthopedic Walk in Monday through Thursday 8 am to 6 pm, Friday 8 am to 5 pm, Saturday 8 am to 12 pm at [address] [place].

## 2025-02-27 ASSESSMENT — ACTIVITIES OF DAILY LIVING (ADL)
DEEP SQUATTING: NO DIFFICULTY AT ALL
WALKING_INITIALLY: SLIGHT DIFFICULTY
GOING_UP_1_FLIGHT_OF_STAIRS: MODERATE DIFFICULTY
SPORTS_HIGHEST_POTENTIAL_SCORE: 36
ADL_HIGHEST_POTENTIAL_SCORE: 68
PLEASE_INDICATE_YOR_PRIMARY_REASON_FOR_REFERRAL_TO_THERAPY:: HIP
ADL_SCORE(%): 0
SPORTS_TOTAL_ITEM_SCORE: 0
ROLLING_OVER_IN_BED: NO DIFFICULTY AT ALL
HEAVY_WORK: SLIGHT DIFFICULTY
TWISTING/PIVOTING ON INVOLVED LEG: SLIGHT DIFFICULTY
ADL_COUNT: 17
STANDING_FOR_15_MINUTES: SLIGHT DIFFICULTY
LIGHT_TO_MODERATE_WORK: SLIGHT DIFFICULTY
WALKING_15_MINUTES_OR_GREATER: EXTREME DIFFICULTY
GOING_DOWN_1_FLIGHT_OF_STAIRS: SLIGHT DIFFICULTY
GETTING_INTO_AND_OUT_OF_AN_AVERAGE_CAR: SLIGHT DIFFICULTY
HOW_WOULD_YOU_RATE_YOUR_CURRENT_LEVEL_OF_FUNCTION?: ABNORMAL
ADL_TOTAL_ITEM_SCORE: 0
STEPPING_UP_AND_DOWN_CURBS: EXTREME DIFFICULTY
STARTING_AND_STOPPING_QUICKLY: SLIGHT DIFFICULTY
PUTTING_ON_SOCKS_AND_SHOES: NO DIFFICULTY AT ALL
WALKING_INITIALLY: SLIGHT DIFFICULTY
SITTING FOR 15 MINUTES: NO DIFFICULTY AT ALL
HOS_ADL_ITEM_SCORE_TOTAL: 35
SPORTS_COUNT: 9
WALKING_FOR_APPROXIMATELY_10_MINUTES: MODERATE DIFFICULTY
GOING UP 1 FLIGHT OF STAIRS: MODERATE DIFFICULTY
CUTTING/LATERAL_MOVEMENTS: NO DIFFICULTY AT ALL
PUTTING ON SOCKS AND SHOES: NO DIFFICULTY AT ALL
HEAVY_WORK: SLIGHT DIFFICULTY
WALKING_15_MINUTES_OR_GREATER: EXTREME DIFFICULTY
HOW_WOULD_YOU_RATE_YOUR_CURRENT_LEVEL_OF_FUNCTION_DURING_YOUR_SPORTS_RELATED_ACTIVITIES_FROM_0_TO_100_WITH_100_BEING_YOUR_LEVEL_OF_FUNCTION_PRIOR_TO_YOUR_HIP_PROBLEM_AND_0_BEING_THE_INABILITY_TO_PERFORM_ANY_OF_YOUR_USUAL_DAILY_ACTIVITIES?: 0
TWISTING/PIVOTING_ON_INVOLVED_LEG: SLIGHT DIFFICULTY
DEEP_SQUATTING: NO DIFFICULTY AT ALL
HOW_WOULD_YOU_RATE_YOUR_CURRENT_LEVEL_OF_FUNCTION_DURING_YOUR_USUAL_ACTIVITIES_OF_DAILY_LIVING_FROM_0_TO_100_WITH_100_BEING_YOUR_LEVEL_OF_FUNCTION_PRIOR_TO_YOUR_HIP_PROBLEM_AND_0_BEING_THE_INABILITY_TO_PERFORM_ANY_OF_YOUR_USUAL_DAILY_ACTIVITIES?: 75
STEPPING UP AND DOWN CURBS: EXTREME DIFFICULTY
ABILITY_TO_PERFORM_ACTIVITY_WITH_YOUR_NORMAL_TECHNIQUE: SLIGHT DIFFICULTY
ROLLING OVER IN BED: NO DIFFICULTY AT ALL
GOING DOWN 1 FLIGHT OF STAIRS: SLIGHT DIFFICULTY
SPORTS_SCORE(%): 0
WALKING_APPROXIMATELY_10_MINUTES: MODERATE DIFFICULTY
LOW_IMPACT_ACTIVITIES_LIKE_FAST_WALKING: MODERATE DIFFICULTY
HOW_WOULD_YOU_RATE_YOUR_CURRENT_LEVEL_OF_FUNCTION_DURING_YOUR_USUAL_ACTIVITIES_OF_DAILY_LIVING_FROM_0_TO_100_WITH_100_BEING_YOUR_LEVEL_OF_FUNCTION_PRIOR_TO_YOUR_HIP_PROBLEM_AND_0_BEING_THE_INABILITY_TO_PERFORM_ANY_OF_YOUR_USUAL_DAILY_ACTIVITIES?: 75
GETTING INTO AND OUT OF AN AVERAGE CAR: SLIGHT DIFFICULTY
SITTING_FOR_15_MINUTES: NO DIFFICULTY AT ALL
LIGHT_TO_MODERATE_WORK: SLIGHT DIFFICULTY
HOS_ADL_HIGHEST_POTENTIAL_SCORE: 52
STANDING FOR 15 MINUTES: SLIGHT DIFFICULTY

## 2025-03-03 ENCOUNTER — THERAPY VISIT (OUTPATIENT)
Dept: PHYSICAL THERAPY | Facility: CLINIC | Age: 80
End: 2025-03-03
Attending: PHYSICIAN ASSISTANT
Payer: COMMERCIAL

## 2025-03-03 DIAGNOSIS — M25.552 ACUTE HIP PAIN, LEFT: ICD-10-CM

## 2025-03-03 PROCEDURE — 97161 PT EVAL LOW COMPLEX 20 MIN: CPT | Mod: GP

## 2025-03-03 PROCEDURE — 97110 THERAPEUTIC EXERCISES: CPT | Mod: GP

## 2025-03-03 NOTE — PROGRESS NOTES
PHYSICAL THERAPY EVALUATION  Type of Visit: Evaluation        Fall Risk Screen:  Fall screen completed by: PT  Have you fallen 2 or more times in the past year?: Yes  Have you fallen and had an injury in the past year?: No  Is patient a fall risk?: No    Subjective   Abby reports she has had an onset of left hip pain that began about a month ago. She feels this onset is related to her increase in carrying heavy objects while helping a friend shop fro groceries. She reports she generally has to walk with a limp to avoid her left hip pain. She cannot walk up the stairs normally and has to adjust her gait. She also reports she has pain when laying on her left side. She notes she can sometimes have pain coming down her left leg that she believes is sciatica.      Presenting condition or subjective complaint: Diagnosed arthritis in hip  Date of onset: 03/03/25    Relevant medical history: High blood pressure; Thyroid problems   Dates & types of surgery: Gall bladder 1980; uterine polyps 2017    Prior diagnostic imaging/testing results: X-ray     Prior therapy history for the same diagnosis, illness or injury: No      Prior Level of Function  Transfers:   Ambulation:   ADL:   IADL:     Living Environment  Social support: Alone   Type of home: [facility]; 2-story   Stairs to enter the home: Yes 13 Is there a railing: Yes     Ramp: No   Stairs inside the home: Yes 13 Is there a railing: Yes     Help at home: None  Equipment owned:       Employment:      Hobbies/Interests:      Patient goals for therapy: Walk without pain and do stairs using both feet!    Pain assessment: See objective evaluation for additional pain details     Objective   HIP EVALUATION  PAIN: Pain Level at Rest: 3/10  Pain Level with Use: 10/10  Pain Location: hip  Pain Quality: Sharp  Pain Frequency: intermittent  Pain is Worst: nighttime  Pain is Exacerbated By: ascending stairs, standing for long periods, putting weight on L side  Pain is Relieved By:  cold, heat, and NSAIDs  Pain Progression: Worsened  INTEGUMENTARY (edema, incisions):   POSTURE:   GAIT:   Weightbearing Status:   Assistive Device(s):   Gait Deviations: Antalgic  Knee valgus R>L  BALANCE/PROPRIOCEPTION: Single Leg Stance Eyes Open (seconds): 5 seconds on L  WEIGHTBEARING ALIGNMENT:   NON-WEIGHTBEARING ALIGNMENT:    ROM:   (Degrees) Left AROM Left PROM  Right AROM Right PROM   Hip Flexion Min loss  Min loss    Hip Extension  Min loss  Min loss   Hip Abduction  WNL  WNL   Hip Internal Rotation  Min loss  Min loss   Hip External Rotation  WNL  WNL   Knee Flexion WNL  WNL    Knee Extension WNL  WNL    Lumbar Side glide WNL, mild pain WNL, mild pain on L   Lumbar Flexion To floor   Lumbar Extension WNL   Pain:   End feel:     PELVIC/SI SCREEN:   STRENGTH:   Pain: - none + mild ++ moderate +++ severe  Strength Scale: 0-5/5 Left Right   Hip Flexion 4-, + (mild) 4   Hip Extension 4- 4   Hip Abduction 3+, + (mild) 4-   Hip Internal Rotation 4 4   Hip External Rotation 4- 4   Knee Flexion 4+ 5-   Knee Extension 4+ 4+     LE FLEXIBILITY:   SPECIAL TESTS:    Left Right   MAGGIE Positive Negative    FADIR/Labrum/KIERAN Positive Negative    SLR Negative  Negative    Slump Positive Positive   Ely's Positive Positive     FUNCTIONAL TESTS: Double Leg Squat: Anterior knee translation, Knee valgus, Hip internal rotation, and Improper use of glutes/hips  PALPATION:   JOINT MOBILITY: NT    Assessment & Plan   CLINICAL IMPRESSIONS  Medical Diagnosis: Left hip pain    Treatment Diagnosis: Left hip pain   Impression/Assessment: Patient is a 79 year old female with left hip pain complaints.  The following significant findings have been identified: Pain, Decreased ROM/flexibility, Decreased joint mobility, Decreased strength, Impaired balance, Impaired gait, and Impaired muscle performance. These impairments interfere with their ability to perform self care tasks, recreational activities, household chores, household mobility,  and community mobility as compared to previous level of function.     Clinical Decision Making (Complexity):  Clinical Presentation: Stable/Uncomplicated  Clinical Presentation Rationale: based on medical and personal factors listed in PT evaluation  Clinical Decision Making (Complexity): Low complexity    PLAN OF CARE  Treatment Interventions:  Interventions: Gait Training, Manual Therapy, Neuromuscular Re-education, Therapeutic Activity, Therapeutic Exercise    Long Term Goals     PT Goal 1  Goal Identifier: LTG 1  Goal Description: Patient will be able to ascend and descend stairs with normal mechanics and no increase in pain  Rationale: to maximize safety and independence with performance of ADLs and functional tasks;to maximize safety and independence within the home;to maximize safety and independence within the community;to maximize safety and independence with self cares  Target Date: 05/26/25      Frequency of Treatment: 1x week  Duration of Treatment: 4 weeks then 2 x month for 8 weeks    Recommended Referrals to Other Professionals:   Education Assessment:   Learner/Method: Patient;No Barriers to Learning    Risks and benefits of evaluation/treatment have been explained.   Patient/Family/caregiver agrees with Plan of Care.     Evaluation Time:     PT Eval, Low Complexity Minutes (37046): 25       Signing Clinician: Saqib Roque, PT        [facility]                                                                                   OUTPATIENT PHYSICAL THERAPY      PLAN OF TREATMENT FOR OUTPATIENT REHABILITATION   Patient's Last Name, First Name, Abby Tang YOB: 1945   Provider's Name   [facility]   Medical Record No.  [id number]     Onset Date: 03/03/25  Start of Care Date: 03/03/25     Medical Diagnosis:  Left hip pain      PT Treatment Diagnosis:  Left hip pain Plan of Treatment  Frequency/Duration: 1x week/ 4  weeks then 2 x month for 8 weeks    Certification date from 03/03/25 to 05/26/25         See note for plan of treatment details and functional goals     Saqib Roque, PT                         I CERTIFY THE NEED FOR THESE SERVICES FURNISHED UNDER        THIS PLAN OF TREATMENT AND WHILE UNDER MY CARE     (Physician attestation of this document indicates review and certification of the therapy plan).              Referring Provider:  Marla Ceballos    Initial Assessment  See Epic Evaluation- Start of Care Date: 03/03/25

## 2025-03-08 DIAGNOSIS — E03.9 HYPOTHYROIDISM, UNSPECIFIED TYPE: Primary | ICD-10-CM

## 2025-03-09 DIAGNOSIS — E78.5 HYPERLIPIDEMIA LDL GOAL <130: Primary | ICD-10-CM

## 2025-03-09 RX ORDER — ROSUVASTATIN CALCIUM 20 MG/1
20 TABLET, COATED ORAL DAILY
Qty: 90 TABLET | Refills: 1 | Status: SHIPPED | OUTPATIENT
Start: 2025-03-09 | End: 2025-03-10

## 2025-03-11 ENCOUNTER — THERAPY VISIT (OUTPATIENT)
Dept: PHYSICAL THERAPY | Facility: CLINIC | Age: 80
End: 2025-03-11
Attending: PHYSICIAN ASSISTANT
Payer: COMMERCIAL

## 2025-03-11 DIAGNOSIS — M25.552 ACUTE HIP PAIN, LEFT: Primary | ICD-10-CM

## 2025-03-11 PROCEDURE — 97110 THERAPEUTIC EXERCISES: CPT | Mod: GP

## 2025-03-18 ENCOUNTER — THERAPY VISIT (OUTPATIENT)
Dept: PHYSICAL THERAPY | Facility: CLINIC | Age: 80
End: 2025-03-18
Attending: PHYSICIAN ASSISTANT
Payer: COMMERCIAL

## 2025-03-18 DIAGNOSIS — M25.552 ACUTE HIP PAIN, LEFT: Primary | ICD-10-CM

## 2025-03-18 PROCEDURE — 97110 THERAPEUTIC EXERCISES: CPT | Mod: GP

## 2025-03-22 ENCOUNTER — HEALTH MAINTENANCE LETTER (OUTPATIENT)
Age: 80
End: 2025-03-22

## 2025-04-01 ENCOUNTER — THERAPY VISIT (OUTPATIENT)
Dept: PHYSICAL THERAPY | Facility: CLINIC | Age: 80
End: 2025-04-01
Payer: COMMERCIAL

## 2025-04-01 DIAGNOSIS — M25.552 ACUTE HIP PAIN, LEFT: Primary | ICD-10-CM

## 2025-04-01 PROCEDURE — 97110 THERAPEUTIC EXERCISES: CPT | Mod: GP

## 2025-04-01 ASSESSMENT — ACTIVITIES OF DAILY LIVING (ADL)
LIGHT_TO_MODERATE_WORK: NO DIFFICULTY AT ALL
HEAVY_WORK: NO DIFFICULTY AT ALL
GOING_UP_1_FLIGHT_OF_STAIRS: SLIGHT DIFFICULTY
ROLLING_OVER_IN_BED: SLIGHT DIFFICULTY
PUTTING_ON_SOCKS_AND_SHOES: NO DIFFICULTY AT ALL
HOS_ADL_SCORE(%): 88.24
WALKING_INITIALLY: SLIGHT DIFFICULTY
HOS_ADL_ITEM_SCORE_TOTAL: 60
WALKING_APPROXIMATELY_10_MINUTES: NO DIFFICULTY AT ALL
WALKING_DOWN_STEEP_HILLS: SLIGHT DIFFICULTY
STEPPING_UP_AND_DOWN_CURBS: NO DIFFICULTY AT ALL
TWISTING/PIVOTING_ON_INVOLVED_LEG: NO DIFFICULTY AT ALL
GETTING_INTO_AND_OUT_OF_A_BATHTUB: NO DIFFICULTY AT ALL
RECREATIONAL_ACTIVITIES: NO DIFFICULTY AT ALL
GOING_DOWN_1_FLIGHT_OF_STAIRS: NO DIFFICULTY AT ALL
WALKING_UP_STEEP_HILLS: MODERATE DIFFICULTY
WALKING_15_MINUTES_OR_GREATER: NO DIFFICULTY AT ALL
HOW_WOULD_YOU_RATE_YOUR_CURRENT_LEVEL_OF_FUNCTION_DURING_YOUR_USUAL_ACTIVITIES_OF_DAILY_LIVING_FROM_0_TO_100_WITH_100_BEING_YOUR_LEVEL_OF_FUNCTION_PRIOR_TO_YOUR_HIP_PROBLEM_AND_0_BEING_THE_INABILITY_TO_PERFORM_ANY_OF_YOUR_USUAL_DAILY_ACTIVITIES?: 90
HOS_ADL_COUNT: 17
SITTING_FOR_15_MINUTES: NO DIFFICULTY AT ALL
HOS_ADL_HIGHEST_POTENTIAL_SCORE: 68
GETTING_INTO_AND_OUT_OF_AN_AVERAGE_CAR: NO DIFFICULTY AT ALL
DEEP_SQUATTING: SLIGHT DIFFICULTY
STANDING_FOR_15_MINUTES: SLIGHT DIFFICULTY

## 2025-04-15 ENCOUNTER — THERAPY VISIT (OUTPATIENT)
Dept: PHYSICAL THERAPY | Facility: CLINIC | Age: 80
End: 2025-04-15
Payer: COMMERCIAL

## 2025-04-15 DIAGNOSIS — M25.552 ACUTE HIP PAIN, LEFT: Primary | ICD-10-CM

## 2025-04-15 PROCEDURE — 97110 THERAPEUTIC EXERCISES: CPT | Mod: GP

## 2025-04-15 PROCEDURE — 97530 THERAPEUTIC ACTIVITIES: CPT | Mod: GP

## 2025-04-15 NOTE — PROGRESS NOTES
04/15/25 0500   Appointment Info   Signing clinician's name / credentials Saqib Mya PT, DPT   Total/Authorized Visits E&T 8 planned   Visits Used 5   Medical Diagnosis Left hip pain   PT Tx Diagnosis Left hip pain   Precautions/Limitations Osteopenia   Progress Note/Certification   Start of Care Date 03/03/25   Onset of illness/injury or Date of Surgery 03/03/25   Therapy Frequency 1x week   Predicted Duration 4 weeks then 2 x month for 8 weeks   Certification date from 03/03/25   Certification date to 05/26/25   PT Goal 1   Goal Identifier LTG 1   Goal Description Patient will be able to ascend and descend stairs with normal mechanics and no increase in pain   Rationale to maximize safety and independence with performance of ADLs and functional tasks;to maximize safety and independence within the home;to maximize safety and independence within the community;to maximize safety and independence with self cares   Goal Progress MET   Target Date 05/26/25   Date Met 04/15/25   Subjective Report   Subjective Report Abby returns feeling like she has made great progress with physical therapy. She is ready to make transition to home management.   Objective Measures   Objective Measures Objective Measure 1   Objective Measure 1   Objective Measure L LE MMT   Details KE: 5-, KF: 4+, HF: 4-, HAB: 4+, HE: 4+   Objective Measure 2   Objective Measure R LE MMT   Details KE: 5-, KF: 4+, HF: 4, HAB: 4+, HE: 5-   Treatment Interventions (PT)   Interventions Therapeutic Procedure/Exercise;Neuromuscular Re-education;Therapeutic Activity   Therapeutic Procedure/Exercise   Therapeutic Procedures: strength, endurance, ROM, flexibility minutes (69372) 25   Ther Proc 1 Education   Ther Proc 1 - Details Plan of care discussion and discussion of how to continue to make strength gains outside of PT.   PTRx Ther Proc 1 Prone Hip Extension With Bent Knee   PTRx Ther Proc 1 - Details 2x 10 reps bilaterally   PTRx Ther Proc 2 Clamshell  with Theraband   PTRx Ther Proc 2 - Details 2x 15 reps on both R/L with GTB   PTRx Ther Proc 3 Side Stepping With Theraband   PTRx Ther Proc 3 - Details 1x down/back with RTB at ankles; 1x down/back with GTB at ankles   PTRx Ther Proc 4 Seated Hamstring Stretch   PTRx Ther Proc 4 - Details Verbal review   Skilled Intervention verbal and manual cues for mm activation and positioning   Patient Response/Progress Patient tolerated well   Therapeutic Activity   Therapeutic Activities: dynamic activities to improve functional performance minutes (99419) 10   Ther Act 1 Testing   Ther Act 1 - Details Increased time for re-testing as seen above   Skilled Intervention Assessment for appropriate interventions   Patient Response/Progress See above   Neuromuscular Re-education   Neuromuscular re-ed of mvmt, balance, coord, kinesthetic sense, posture, proprioception minutes (39108) 5   PTRx Neuro Re-ed 1 Single Leg Stance - Balance Right Foot   PTRx Neuro Re-ed 1 - Details 45 second bilaterally; discussion of continuation of exercises daily to promote balance   Skilled Intervention Improve balance   Patient Response/Progress Patient tolerated well   Education   Learner/Method Patient;No Barriers to Learning   Plan   Home program Printed PTRx   Plan for next session N/A   Total Session Time   Timed Code Treatment Minutes 40   Total Treatment Time (sum of timed and untimed services) 40         DISCHARGE  Reason for Discharge: Patient has met all goals.    Equipment Issued: TheraBand    Discharge Plan: Patient to continue home program.    Referring Provider:  Marla Ceballos

## 2025-07-24 ENCOUNTER — OFFICE VISIT (OUTPATIENT)
Dept: DERMATOLOGY | Facility: CLINIC | Age: 80
End: 2025-07-24
Payer: COMMERCIAL

## 2025-07-24 DIAGNOSIS — L57.8 DIFFUSE PHOTODAMAGE OF SKIN: ICD-10-CM

## 2025-07-24 DIAGNOSIS — L81.4 LENTIGO: Primary | ICD-10-CM

## 2025-07-24 NOTE — LETTER
7/24/2025      Abby Casey  [address] [place] [id number]      Dear Colleague,    Thank you for referring your patient, Abby Casey, to the [facility]. Please see a copy of my visit note below.    [facility] D[facility]at[facility] Note  Encounter Date: Jul 24, 2025  Office Visit     Reviewed patients past medical history and pertinent chart review prior to patients visit today.     Dermatology Problem List:    Personal Hx: Negative for personal history of skin cancer.   Family Hx: Positive for unknown type of skin cancer, father.  ____________________________________________    Assessment & Plan:     # Photodamage, nose  # Solar lentigines, query subtle possible AK at the mid nasal dorsum  - No concerning features on dermoscopy.  Discussed that there is possibly a subtle actinic keratosis of the mid nasal dorsum, but otherwise no worrisome findings.  Cryotherapy offered today versus continued observation.  She elects to continue to observe.  We discussed the importance of self exams at home.   - Sun protection: Counseled SPF 30+ sunscreen, UPF clothing, sun avoidance, tanning bed avoidance.    Follow up: annually, prn for new or changing lesions or new concerns    All risks, benefits and alternatives were discussed with patient.  Patient is in agreement and understands the assessment and plan.  All questions were answered.  Gia Hogan PA-C  [facility] Dermat[facility]  _______________________________________    CC: Derm Problem (- lesion on nose, patient feels the texture and coloring is different, red, present for over a year)     HPI:  Ms. Abby Casey is a(n) 80 year old female who presents today as a return patient for evaluation of a skin changes involving the nose. She has noticed this for at least 1 year. It does not itch, bleed, or cause pain. It has changed in texture and color. She feels her pores are more noticeable.     Patient  is otherwise feeling well, without additional skin concerns. She practices sun avoidance.     Physical Exam:  SKIN: Focused examination of face only was performed per patient request.  - Brown macules on the nose with reassuring findings  - Subtle scale at the mid nasal dorsum, although minimally appreciable    - No other lesions of concern on areas examined.     Medications:  Current Outpatient Medications   Medication Sig Dispense Refill     amLODIPine (NORVASC) 2.5 MG tablet Take 1 tablet (2.5 mg) by mouth daily. 90 tablet 4     augmented betamethasone dipropionate (DIPROLENE AF) 0.05 % external cream Apply to AA BID x 1-2 weeks then PRN only. Do not apply to face 50 g 2     cinnamon 500 MG TABS Take 2 capsules by mouth daily       guaiFENesin (MUCINEX) 600 MG 12 hr tablet Take 2 tablets by mouth 2 times daily as needed for congestion. 40 tablet 0     halobetasol (ULTRAVATE) 0.05 % external cream Use at bedtime weekly for maintenance of Lichen sclerosis. 50 g 1     levothyroxine (SYNTHROID/LEVOTHROID) 100 MCG tablet Take 1 tablet (100 mcg) by mouth daily. 90 tablet 3     MULTIVITAMIN OR 1 daily       NAPROXEN PO Take 220 mg by mouth 2 times daily as needed for moderate pain       propranolol (INDERAL) 10 MG tablet Take 1 tablet (10 mg) by mouth 2 times daily as needed (Anxiety). 60 tablet 1     PROSHIELD PLUS SKIN PROTECTANT 1 % EX CREA use as needed  up to QID 1 tube 1     rosuvastatin (CRESTOR) 5 MG tablet Take 1 tablet (5 mg) by mouth daily. 90 tablet 1     TURMERIC PO Take 1 tablet by mouth daily       No current facility-administered medications for this visit.      Past Medical History:   Patient Active Problem List   Diagnosis     Hypothyroidism     Abnormal blood chemistry     Disorder of bone and cartilage     Diverticulosis of large intestine     Lichen sclerosus et atrophicus of the vulva     HYPERLIPIDEMIA LDL GOAL <130     Elevated glucose     Papanicolaou smear of cervix with low grade squamous  intraepithelial lesion (LGSIL)     Acute pain of right shoulder     Right rotator cuff tear     Rupture of right proximal biceps tendon     Tendinopathy of right rotator cuff     Osteoarthritis of right acromioclavicular joint     Obesity     BP check     Past Medical History:   Diagnosis Date     Depressive disorder sept/oct 2015    situational depression     History of blood transfusion 1980    Uncertain about transfusion     Migraine, unspecified, without mention of intractable migraine without mention of status migrainosus      Osteoarthritis of right acromioclavicular joint      Other and unspecified hyperlipidemia 1990's    Pravachol, Zocor  Formulary issues, 6/02 Advicor     Unspecified hypothyroidism 1980's     Unspecified noninflammatory disorder of cervix     Atypical cervix - colposcopy 10/99       CC Referred Self, MD  No address on file on close of this encounter.     Again, thank you for allowing me to participate in the care of your patient.        Sincerely,        Marietta Hogan PA-C    Electronically signed

## 2025-07-24 NOTE — PROGRESS NOTES
[facility] Dermatology Note  Encounter Date: Jul 24, 2025  Office Visit     Reviewed patients past medical history and pertinent chart review prior to patients visit today.     Dermatology Problem List:    Personal Hx: Negative for personal history of skin cancer.   Family Hx: Positive for unknown type of skin cancer, father.  ____________________________________________    Assessment & Plan:     # Photodamage, nose  # Solar lentigines, query subtle possible AK at the mid nasal dorsum  - No concerning features on dermoscopy.  Discussed that there is possibly a subtle actinic keratosis of the mid nasal dorsum, but otherwise no worrisome findings.  Cryotherapy offered today versus continued observation.  She elects to continue to observe.  We discussed the importance of self exams at home.   - Sun protection: Counseled SPF 30+ sunscreen, UPF clothing, sun avoidance, tanning bed avoidance.    Follow up: annually, prn for new or changing lesions or new concerns    All risks, benefits and alternatives were discussed with patient.  Patient is in agreement and understands the assessment and plan.  All questions were answered.  Gia Hogan PA-C  [facility] Dermatology  _______________________________________    CC: Derm Problem (- lesion on nose, patient feels the texture and coloring is different, red, present for over a year)     HPI:  Ms. Abby Casey is a(n) 80 year old female who presents today as a return patient for evaluation of a skin changes involving the nose. She has noticed this for at least 1 year. It does not itch, bleed, or cause pain. It has changed in texture and color. She feels her pores are more noticeable.     Patient is otherwise feeling well, without additional skin concerns. She practices sun avoidance.     Physical Exam:  SKIN: Focused examination of face only was performed per patient request.  - Brown macules on the nose with reassuring findings  - Subtle scale  at the mid nasal dorsum, although minimally appreciable    - No other lesions of concern on areas examined.     Medications:  Current Outpatient Medications   Medication Sig Dispense Refill    amLODIPine (NORVASC) 2.5 MG tablet Take 1 tablet (2.5 mg) by mouth daily. 90 tablet 4    augmented betamethasone dipropionate (DIPROLENE AF) 0.05 % external cream Apply to AA BID x 1-2 weeks then PRN only. Do not apply to face 50 g 2    cinnamon 500 MG TABS Take 2 capsules by mouth daily      guaiFENesin (MUCINEX) 600 MG 12 hr tablet Take 2 tablets by mouth 2 times daily as needed for congestion. 40 tablet 0    halobetasol (ULTRAVATE) 0.05 % external cream Use at bedtime weekly for maintenance of Lichen sclerosis. 50 g 1    levothyroxine (SYNTHROID/LEVOTHROID) 100 MCG tablet Take 1 tablet (100 mcg) by mouth daily. 90 tablet 3    MULTIVITAMIN OR 1 daily      NAPROXEN PO Take 220 mg by mouth 2 times daily as needed for moderate pain      propranolol (INDERAL) 10 MG tablet Take 1 tablet (10 mg) by mouth 2 times daily as needed (Anxiety). 60 tablet 1    PROSHIELD PLUS SKIN PROTECTANT 1 % EX CREA use as needed  up to QID 1 tube 1    rosuvastatin (CRESTOR) 5 MG tablet Take 1 tablet (5 mg) by mouth daily. 90 tablet 1    TURMERIC PO Take 1 tablet by mouth daily       No current facility-administered medications for this visit.      Past Medical History:   Patient Active Problem List   Diagnosis    Hypothyroidism    Abnormal blood chemistry    Disorder of bone and cartilage    Diverticulosis of large intestine    Lichen sclerosus et atrophicus of the vulva    HYPERLIPIDEMIA LDL GOAL <130    Elevated glucose    Papanicolaou smear of cervix with low grade squamous intraepithelial lesion (LGSIL)    Acute pain of right shoulder    Right rotator cuff tear    Rupture of right proximal biceps tendon    Tendinopathy of right rotator cuff    Osteoarthritis of right acromioclavicular joint    Obesity    BP check     Past Medical History:    Diagnosis Date    Depressive disorder sept/oct 2015    situational depression    History of blood transfusion 1980    Uncertain about transfusion    Migraine, unspecified, without mention of intractable migraine without mention of status migrainosus     Osteoarthritis of right acromioclavicular joint     Other and unspecified hyperlipidemia 1990's    Pravachol, Zocor  Formulary issues, 6/02 Advicor    Unspecified hypothyroidism 1980's    Unspecified noninflammatory disorder of cervix     Atypical cervix - colposcopy 10/99       CC Referred Self, MD  No address on file on close of this encounter.

## 2025-08-04 ENCOUNTER — PATIENT OUTREACH (OUTPATIENT)
Dept: CARE COORDINATION | Facility: CLINIC | Age: 80
End: 2025-08-04
Payer: COMMERCIAL

## 2025-08-28 DIAGNOSIS — E78.5 HYPERLIPIDEMIA LDL GOAL <130: ICD-10-CM

## 2025-08-28 DIAGNOSIS — E03.9 HYPOTHYROIDISM, UNSPECIFIED TYPE: ICD-10-CM

## 2025-08-28 RX ORDER — ROSUVASTATIN CALCIUM 5 MG/1
5 TABLET, COATED ORAL DAILY
Qty: 90 TABLET | Refills: 0 | Status: SHIPPED | OUTPATIENT
Start: 2025-08-28

## 2025-08-31 RX ORDER — LEVOTHYROXINE SODIUM 100 UG/1
100 TABLET ORAL DAILY
Qty: 30 TABLET | Refills: 0 | Status: SHIPPED | OUTPATIENT
Start: 2025-08-31

## 2025-09-01 ENCOUNTER — PATIENT OUTREACH (OUTPATIENT)
Dept: CARE COORDINATION | Facility: CLINIC | Age: 80
End: 2025-09-01
Payer: COMMERCIAL

## 2025-09-02 ENCOUNTER — OFFICE VISIT (OUTPATIENT)
Dept: ORTHOPEDICS | Facility: CLINIC | Age: 80
End: 2025-09-02
Payer: COMMERCIAL

## 2025-09-02 ENCOUNTER — ANCILLARY PROCEDURE (OUTPATIENT)
Dept: GENERAL RADIOLOGY | Facility: CLINIC | Age: 80
End: 2025-09-02
Attending: FAMILY MEDICINE
Payer: COMMERCIAL

## 2025-09-02 VITALS — HEIGHT: 61 IN | BODY MASS INDEX: 28.55 KG/M2 | WEIGHT: 151.2 LBS

## 2025-09-02 DIAGNOSIS — S39.012A STRAIN OF LUMBAR REGION, INITIAL ENCOUNTER: ICD-10-CM

## 2025-09-02 DIAGNOSIS — M25.552 ACUTE HIP PAIN, LEFT: Primary | ICD-10-CM

## 2025-09-02 DIAGNOSIS — M54.42 ACUTE LEFT-SIDED LOW BACK PAIN WITH LEFT-SIDED SCIATICA: ICD-10-CM

## 2025-09-02 DIAGNOSIS — M54.9 BACK PAIN: ICD-10-CM

## 2025-09-02 PROCEDURE — 72100 X-RAY EXAM L-S SPINE 2/3 VWS: CPT | Mod: TC | Performed by: RADIOLOGY

## 2025-09-02 PROCEDURE — 99204 OFFICE O/P NEW MOD 45 MIN: CPT | Performed by: FAMILY MEDICINE

## 2025-09-02 PROCEDURE — G2211 COMPLEX E/M VISIT ADD ON: HCPCS | Performed by: FAMILY MEDICINE

## 2025-09-02 RX ORDER — TIZANIDINE 2 MG/1
2 TABLET ORAL 2 TIMES DAILY PRN
Qty: 40 TABLET | Refills: 0 | Status: SHIPPED | OUTPATIENT
Start: 2025-09-02

## 2025-09-02 RX ORDER — NAPROXEN 500 MG/1
500 TABLET ORAL 2 TIMES DAILY WITH MEALS
Qty: 30 TABLET | Refills: 0 | Status: SHIPPED | OUTPATIENT
Start: 2025-09-02

## (undated) DEVICE — GLOVE PROTEXIS MICRO 6.0  2D73PM60

## (undated) DEVICE — BASIN SET MINOR DISP

## (undated) DEVICE — DEVICE TISSUE REMOVAL MYOSURE 10-403/ SGL PKG 10-401

## (undated) DEVICE — SOL NACL 0.9% IRRIG 1000ML BOTTLE 2F7124

## (undated) DEVICE — LINEN DRAPE 54X72" 5467

## (undated) DEVICE — SOL NACL 0.9% IRRIG 3000ML BAG 2B7477

## (undated) DEVICE — TUBING SYS AQUILEX BLUE INFLOW AQL-110 YLW OUTFLOW AQL-111

## (undated) DEVICE — GLOVE PROTEXIS BLUE W/NEU-THERA 6.5  2D73EB65

## (undated) DEVICE — CATH INTERMITTENT CLEAN-CATH FEMALE 14FR 6" VINYL LF 420614

## (undated) DEVICE — PACK MINOR LITHOTOMY RIDGES

## (undated) DEVICE — DRAPE POUCH IRR 1016

## (undated) DEVICE — SEAL SET MYOSURE ROD LENS SCOPE SINGLE USE 40-902

## (undated) DEVICE — SUCTION CANISTER BEMIS HI FLOW 006772-901

## (undated) DEVICE — LINEN HALF SHEET 5512

## (undated) DEVICE — LINEN FULL SHEET 5511

## (undated) DEVICE — SPECIMEN BAG BEMIS HI FLOW SUCTION WHITE SOCK 533810

## (undated) DEVICE — BAG CLEAR TRASH 1.3M 39X33" P4040C

## (undated) RX ORDER — PROPOFOL 10 MG/ML
INJECTION, EMULSION INTRAVENOUS
Status: DISPENSED
Start: 2017-05-16

## (undated) RX ORDER — LIDOCAINE HYDROCHLORIDE 10 MG/ML
INJECTION, SOLUTION EPIDURAL; INFILTRATION; INTRACAUDAL; PERINEURAL
Status: DISPENSED
Start: 2017-05-16

## (undated) RX ORDER — FENTANYL CITRATE 50 UG/ML
INJECTION, SOLUTION INTRAMUSCULAR; INTRAVENOUS
Status: DISPENSED
Start: 2017-05-16

## (undated) RX ORDER — ACETAMINOPHEN 500 MG
TABLET ORAL
Status: DISPENSED
Start: 2017-05-16